# Patient Record
Sex: MALE | Race: WHITE
[De-identification: names, ages, dates, MRNs, and addresses within clinical notes are randomized per-mention and may not be internally consistent; named-entity substitution may affect disease eponyms.]

---

## 2017-11-16 ENCOUNTER — HOSPITAL ENCOUNTER (INPATIENT)
Dept: HOSPITAL 17 - NEPE | Age: 77
LOS: 3 days | Discharge: HOME HEALTH | DRG: 811 | End: 2017-11-19
Attending: FAMILY MEDICINE | Admitting: FAMILY MEDICINE
Payer: MEDICARE

## 2017-11-16 VITALS
RESPIRATION RATE: 24 BRPM | DIASTOLIC BLOOD PRESSURE: 60 MMHG | OXYGEN SATURATION: 99 % | SYSTOLIC BLOOD PRESSURE: 134 MMHG | HEART RATE: 90 BPM

## 2017-11-16 VITALS
RESPIRATION RATE: 18 BRPM | SYSTOLIC BLOOD PRESSURE: 136 MMHG | HEART RATE: 78 BPM | OXYGEN SATURATION: 99 % | DIASTOLIC BLOOD PRESSURE: 70 MMHG | TEMPERATURE: 98.7 F

## 2017-11-16 VITALS
OXYGEN SATURATION: 98 % | SYSTOLIC BLOOD PRESSURE: 127 MMHG | HEART RATE: 72 BPM | RESPIRATION RATE: 18 BRPM | DIASTOLIC BLOOD PRESSURE: 61 MMHG | TEMPERATURE: 98.2 F

## 2017-11-16 VITALS
RESPIRATION RATE: 21 BRPM | HEART RATE: 84 BPM | DIASTOLIC BLOOD PRESSURE: 84 MMHG | OXYGEN SATURATION: 96 % | SYSTOLIC BLOOD PRESSURE: 154 MMHG | TEMPERATURE: 97.4 F

## 2017-11-16 VITALS
OXYGEN SATURATION: 99 % | RESPIRATION RATE: 20 BRPM | SYSTOLIC BLOOD PRESSURE: 113 MMHG | DIASTOLIC BLOOD PRESSURE: 57 MMHG | HEART RATE: 68 BPM

## 2017-11-16 VITALS — WEIGHT: 209.22 LBS | HEIGHT: 66 IN | BODY MASS INDEX: 33.62 KG/M2

## 2017-11-16 VITALS
OXYGEN SATURATION: 96 % | SYSTOLIC BLOOD PRESSURE: 118 MMHG | RESPIRATION RATE: 20 BRPM | TEMPERATURE: 97.5 F | DIASTOLIC BLOOD PRESSURE: 61 MMHG | HEART RATE: 68 BPM

## 2017-11-16 VITALS
DIASTOLIC BLOOD PRESSURE: 96 MMHG | RESPIRATION RATE: 16 BRPM | HEART RATE: 84 BPM | TEMPERATURE: 98.3 F | SYSTOLIC BLOOD PRESSURE: 170 MMHG | OXYGEN SATURATION: 99 %

## 2017-11-16 VITALS — SYSTOLIC BLOOD PRESSURE: 127 MMHG | RESPIRATION RATE: 20 BRPM | HEART RATE: 69 BPM | DIASTOLIC BLOOD PRESSURE: 61 MMHG

## 2017-11-16 VITALS — OXYGEN SATURATION: 97 %

## 2017-11-16 DIAGNOSIS — Z95.1: ICD-10-CM

## 2017-11-16 DIAGNOSIS — K64.8: ICD-10-CM

## 2017-11-16 DIAGNOSIS — D64.9: Primary | ICD-10-CM

## 2017-11-16 DIAGNOSIS — Z79.82: ICD-10-CM

## 2017-11-16 DIAGNOSIS — Z86.73: ICD-10-CM

## 2017-11-16 DIAGNOSIS — Z95.5: ICD-10-CM

## 2017-11-16 DIAGNOSIS — Z87.891: ICD-10-CM

## 2017-11-16 DIAGNOSIS — F10.21: ICD-10-CM

## 2017-11-16 DIAGNOSIS — K44.9: ICD-10-CM

## 2017-11-16 DIAGNOSIS — I25.2: ICD-10-CM

## 2017-11-16 DIAGNOSIS — N28.1: ICD-10-CM

## 2017-11-16 DIAGNOSIS — M15.9: ICD-10-CM

## 2017-11-16 DIAGNOSIS — G47.30: ICD-10-CM

## 2017-11-16 DIAGNOSIS — R01.1: ICD-10-CM

## 2017-11-16 DIAGNOSIS — J44.9: ICD-10-CM

## 2017-11-16 DIAGNOSIS — F41.9: ICD-10-CM

## 2017-11-16 DIAGNOSIS — I25.10: ICD-10-CM

## 2017-11-16 DIAGNOSIS — F32.9: ICD-10-CM

## 2017-11-16 DIAGNOSIS — I50.23: ICD-10-CM

## 2017-11-16 DIAGNOSIS — I11.0: ICD-10-CM

## 2017-11-16 DIAGNOSIS — I35.8: ICD-10-CM

## 2017-11-16 DIAGNOSIS — K29.50: ICD-10-CM

## 2017-11-16 LAB
ALP SERPL-CCNC: 68 U/L (ref 45–117)
ALT SERPL-CCNC: 27 U/L (ref 12–78)
ANION GAP SERPL CALC-SCNC: 9 MEQ/L (ref 5–15)
APTT BLD: 22.6 SEC (ref 24.3–30.1)
AST SERPL-CCNC: 20 U/L (ref 15–37)
BASOPHILS # BLD AUTO: 0.1 TH/MM3 (ref 0–0.2)
BASOPHILS NFR BLD: 1.1 % (ref 0–2)
BILIRUB SERPL-MCNC: 0.2 MG/DL (ref 0.2–1)
BUN SERPL-MCNC: 22 MG/DL (ref 7–18)
CHLORIDE SERPL-SCNC: 108 MEQ/L (ref 98–107)
CK SERPL-CCNC: 41 U/L (ref 39–308)
CK SERPL-CCNC: 53 U/L (ref 39–308)
EOSINOPHIL # BLD: 0.1 TH/MM3 (ref 0–0.4)
EOSINOPHIL NFR BLD: 1.3 % (ref 0–4)
ERYTHROCYTE [DISTWIDTH] IN BLOOD BY AUTOMATED COUNT: 20.4 % (ref 11.6–17.2)
GFR SERPLBLD BASED ON 1.73 SQ M-ARVRAT: 53 ML/MIN (ref 89–?)
HCO3 BLD-SCNC: 24.1 MEQ/L (ref 21–32)
HCT VFR BLD CALC: 20.2 % (ref 39–51)
HEMO FLAGS: (no result)
INR PPP: 1 RATIO
LYMPHOCYTES # BLD AUTO: 1 TH/MM3 (ref 1–4.8)
LYMPHOCYTES NFR BLD AUTO: 13.8 % (ref 9–44)
MCH RBC QN AUTO: 15.5 PG (ref 27–34)
MCHC RBC AUTO-ENTMCNC: 27.2 % (ref 32–36)
MCV RBC AUTO: 56.9 FL (ref 80–100)
MONOCYTES NFR BLD: 10.6 % (ref 0–8)
NEUTROPHILS # BLD AUTO: 5.3 TH/MM3 (ref 1.8–7.7)
NEUTROPHILS NFR BLD AUTO: 73.2 % (ref 16–70)
PLATELET # BLD: 303 TH/MM3 (ref 150–450)
POTASSIUM SERPL-SCNC: 4.2 MEQ/L (ref 3.5–5.1)
PROTHROMBIN TIME: 10.9 SEC (ref 9.8–11.6)
RBC # BLD AUTO: 3.55 MIL/MM3 (ref 4.5–5.9)
SODIUM SERPL-SCNC: 141 MEQ/L (ref 136–145)
WBC # BLD AUTO: 7.3 TH/MM3 (ref 4–11)

## 2017-11-16 PROCEDURE — P9016 RBC LEUKOCYTES REDUCED: HCPCS

## 2017-11-16 PROCEDURE — 85014 HEMATOCRIT: CPT

## 2017-11-16 PROCEDURE — 88305 TISSUE EXAM BY PATHOLOGIST: CPT

## 2017-11-16 PROCEDURE — 94150 VITAL CAPACITY TEST: CPT

## 2017-11-16 PROCEDURE — 83880 ASSAY OF NATRIURETIC PEPTIDE: CPT

## 2017-11-16 PROCEDURE — 74250 X-RAY XM SM INT 1CNTRST STD: CPT

## 2017-11-16 PROCEDURE — 86920 COMPATIBILITY TEST SPIN: CPT

## 2017-11-16 PROCEDURE — G0103 PSA SCREENING: HCPCS

## 2017-11-16 PROCEDURE — 88312 SPECIAL STAINS GROUP 1: CPT

## 2017-11-16 PROCEDURE — 36430 TRANSFUSION BLD/BLD COMPNT: CPT

## 2017-11-16 PROCEDURE — 86901 BLOOD TYPING SEROLOGIC RH(D): CPT

## 2017-11-16 PROCEDURE — 82728 ASSAY OF FERRITIN: CPT

## 2017-11-16 PROCEDURE — 93306 TTE W/DOPPLER COMPLETE: CPT

## 2017-11-16 PROCEDURE — 85730 THROMBOPLASTIN TIME PARTIAL: CPT

## 2017-11-16 PROCEDURE — 76536 US EXAM OF HEAD AND NECK: CPT

## 2017-11-16 PROCEDURE — 83540 ASSAY OF IRON: CPT

## 2017-11-16 PROCEDURE — 71010: CPT

## 2017-11-16 PROCEDURE — 94640 AIRWAY INHALATION TREATMENT: CPT

## 2017-11-16 PROCEDURE — 83550 IRON BINDING TEST: CPT

## 2017-11-16 PROCEDURE — 85018 HEMOGLOBIN: CPT

## 2017-11-16 PROCEDURE — 84484 ASSAY OF TROPONIN QUANT: CPT

## 2017-11-16 PROCEDURE — 82550 ASSAY OF CK (CPK): CPT

## 2017-11-16 PROCEDURE — 85027 COMPLETE CBC AUTOMATED: CPT

## 2017-11-16 PROCEDURE — 86900 BLOOD TYPING SEROLOGIC ABO: CPT

## 2017-11-16 PROCEDURE — 93005 ELECTROCARDIOGRAM TRACING: CPT

## 2017-11-16 PROCEDURE — 86850 RBC ANTIBODY SCREEN: CPT

## 2017-11-16 PROCEDURE — 80053 COMPREHEN METABOLIC PANEL: CPT

## 2017-11-16 PROCEDURE — 30233N1 TRANSFUSION OF NONAUTOLOGOUS RED BLOOD CELLS INTO PERIPHERAL VEIN, PERCUTANEOUS APPROACH: ICD-10-PCS | Performed by: EMERGENCY MEDICINE

## 2017-11-16 PROCEDURE — 85025 COMPLETE CBC W/AUTO DIFF WBC: CPT

## 2017-11-16 PROCEDURE — 94664 DEMO&/EVAL PT USE INHALER: CPT

## 2017-11-16 PROCEDURE — 85610 PROTHROMBIN TIME: CPT

## 2017-11-16 PROCEDURE — 80048 BASIC METABOLIC PNL TOTAL CA: CPT

## 2017-11-16 PROCEDURE — 76700 US EXAM ABDOM COMPLETE: CPT

## 2017-11-16 PROCEDURE — 85379 FIBRIN DEGRADATION QUANT: CPT

## 2017-11-16 RX ADMIN — STANDARDIZED SENNA CONCENTRATE AND DOCUSATE SODIUM SCH TAB: 8.6; 5 TABLET, FILM COATED ORAL at 20:27

## 2017-11-16 RX ADMIN — Medication SCH ML: at 20:27

## 2017-11-16 NOTE — RADRPT
EXAM DATE/TIME:  11/16/2017 09:06 

 

[facility] COMPARISON:     

CHEST SINGLE AP, March 31, 2015, 3:22.

 

                     

INDICATIONS :     

short of breath

                     

 

MEDICAL HISTORY :     

Chronic obstructive pulmonary disease.          

 

SURGICAL HISTORY :     

CABG. Coronary artery stent.  

 

ENCOUNTER:     

Initial                                        

 

ACUITY:     

2 months      

 

PAIN SCORE:     

0/10

 

LOCATION:     

Bilateral chest 

 

FINDINGS:     

A single portable frontal view the chest shows moderate cardiomegaly. No significant pulmonary vascul
ar engorgement. Lungs are clear. Hiatal hernia noted. No effusions. Median sternotomy wires.

 

CONCLUSION:     

Cardiomegaly. Clear lungs. Hiatal hernia.

 

 

 

 Gucci Mcdaniel Jr., MD on November 16, 2017 at 9:36           

Board Certified Radiologist.

 This report was verified electronically.

## 2017-11-16 NOTE — RADRPT
EXAM DATE/TIME:  11/16/2017 14:05 

 

[facility] COMPARISON:     

No previous studies available for comparison.

        

 

 

INDICATIONS :     

Palpable mass, right neck. 

                     

 

MEDICAL HISTORY :     

Chronic obstructive pulmonary disease. Myocardial infarction. Hypercholesterolemia. Seizures. Syncope
. Congestive heart failure. 

 

SURGICAL HISTORY :          

Neurologic surgery. CABG. Cardiac catheterization. Coronary stent. Hernia repair. Carotid surgery.

 

ENCOUNTER:     

Initial

 

ACUITY:     

1 day

 

PAIN SCORE:     

2/10

 

LOCATION:     

Right neck 

                     

AREA EVALUATED:       

Right  neck. 

 

FINDINGS:     

 

MASSES:     

None.    Palpable abnormality appears to correspond to a prominent jugular vein      near the subclav
janice.

 

FLUID COLLECTIONS:     

None.  

 

OTHER:     

Negative.  

 

CONCLUSION:     

1. Palpable abnormality corresponds to prominent venous structures at the junction of the internal an
d subclavian veins in the right neck.

2. No mass lesions.

 

 

 

 Trace Meehan MD on November 16, 2017 at 16:18           

Board Certified Radiologist.

 This report was verified electronically.

## 2017-11-16 NOTE — HHI.HP
HPI


Service


Family Medicine


Primary Care Physician


No Primary Care Physician


Admission Diagnosis





Anemia, CHF


Diagnoses:  


International Travel<30 Days:  No


Contact w/Intl Traveler<30days:  No


Known Affected Area:  No


History of Present Illness


Patient is a 77-year-old male with extensive cardiac history status post CABG 

and stent placements x3 presented to the ED with complaints of progressively 

worsening shortness of breath on exertion and fatigue. Patient states that 

shortness of breath on exertion has been occurring during the past year but has 

worsen in the past 2 days to the point where he is not able to walk short 

distances without stopping to catch his breath. Patient states he has bandlike 

chest pain across the rib cage radiating to the back. Describes back pain as 

stabbing quality. Of note patient states that this chest pain is similar to 

pain he experienced before he had stents placed. Denies radiation of pain into 

his left jaw or left arm.  Patient also reports swelling in his lower 

extremities for the past 5 days. Denies weight loss, dizziness, vision changes, 

abdominal pain, nausea, vomiting, blood in his stool. Pt takes asa 325mg daily. 





Of note patient states last colonoscopy was 13 years ago and it was normal. He 

also stated that 13 years ago he was diagnosed with anemia (hgb 7.1) and 

required blood transfusion. The source of the bleeding was not identified.   


 (Christine Gamble MD, R1)





Review of Systems


Constitutional:  COMPLAINS OF: Fatigue, DENIES: Fever, Weight loss, Chills, 

Dizziness


Respiratory:  COMPLAINS OF: Shortness of breath (on exertion), DENIES: Cough


Cardiovascular:  COMPLAINS OF: Chest pain (band-like)


Gastrointestinal:  DENIES: Black stools, Bloody stools, Nausea, Vomiting


Genitourinary:  COMPLAINS OF: Nocturia


Musculoskeletal:  COMPLAINS OF: Back pain (stabbing back pain)


Integumentary:  DENIES: Rash


Neurologic:  DENIES: Headache, Localized weakness, Seizures (Christine Gamble MD

, R1)





Past Family Social History


Past Medical History


Anemia (hgb 7.1) requiring blood transfusion


s/p CABG and stent placement x3, 13 yrs ago


CHF


COPD- does not use home O2


Past Surgical History


Carotic endarterectomy 12 y ago


lens implants


 (Christine Gamble MD, R1)


Allergies:  


Coded Allergies:  


     No Known Allergies (Verified  Allergy, Unknown, 17)


Family History


Mother-- at 80 yo  due to stroke


Father--colon cancer at 80 yo


Brother--cardiac disease S/P open heart surgery 2


Social History


Patient lives alone 


stopped working in 2017


Sober the past 52 years


Quit smoking 50 years ago


Denies illicit drug use


 (Christine Gamble MD, R1)





Physical Exam


Vital Signs





Vital Signs








  Date Time  Temp Pulse Resp B/P (MAP) Pulse Ox O2 Delivery O2 Flow Rate FiO2


 


17 10:30  68 20 113/57 (75) 99 Room Air  


 


17 09:48     99 Nasal Cannula 2.00 


 


17 08:37  90 24 134/60 (84) 99 Nasal Cannula 2.00 


 


17 08:33   16  99 Nasal Cannula 2.00 


 


17 08:27 98.3 84 16 170/96 (120) 99   








Physical Exam


GENERAL: This is a well-nourished, well-developed patient, in no apparent 

distress.


SKIN: No rashes, ecchymoses or lesions. Cool and dry.


HEAD: Atraumatic. Normocephalic. No temporal or scalp tenderness.


EYES: Pupils equal round and reactive. Extraocular motions intact. No scleral 

icterus. No injection or drainage. 


ENT: Nose without bleeding, purulent drainage or septal hematoma. Throat 

without erythema, tonsillar hypertrophy or exudate. Uvula midline. Airway 

patent.


NECK: Trachea midline. No lymphadenopathy. Supple, nontender, no meningeal 

signs. Pulsating area on right side of neck. 


CARDIOVASCULAR: Normal S1-S2 . Flow murmur heard on auscultation at left upper 

sternal border .Regular rate and rhythm without, gallops, or rubs. 


RESPIRATORY: Rales heard at lower lung bases BL.


GASTROINTESTINAL: Abdomen soft, non-tender, distended. No hepato-splenomegaly, 

or palpable masses. No guarding.


MUSCULOSKELETAL: Extremities without clubbing, cyanosis, or edema. No joint 

tenderness, effusion, or edema noted. No calf tenderness. Negative Homans sign 

bilaterally.


NEUROLOGICAL: Awake and alert. Cranial nerves II through XII intact.  Motor and 

sensory grossly within normal limits. Five out of 5 muscle strength in all 

muscle groups.  Normal speech.


Laboratory





Laboratory Tests








Test


  17


08:50 17


10:40


 


White Blood Count 7.3  


 


Red Blood Count 3.55  


 


Hemoglobin 5.5  


 


Hematocrit 20.2  


 


Mean Corpuscular Volume 56.9  


 


Mean Corpuscular Hemoglobin 15.5  


 


Mean Corpuscular Hemoglobin


Concent 27.2 


  


 


 


Red Cell Distribution Width 20.4  


 


Platelet Count 303  


 


Mean Platelet Volume 8.8  


 


Neutrophils (%) (Auto) 73.2  


 


Lymphocytes (%) (Auto) 13.8  


 


Monocytes (%) (Auto) 10.6  


 


Eosinophils (%) (Auto) 1.3  


 


Basophils (%) (Auto) 1.1  


 


Neutrophils # (Auto) 5.3  


 


Lymphocytes # (Auto) 1.0  


 


Monocytes # (Auto) 0.8  


 


Eosinophils # (Auto) 0.1  


 


Basophils # (Auto) 0.1  


 


CBC Comment DIFF FINAL  


 


Differential Comment   


 


Blood Urea Nitrogen 22  


 


Creatinine 1.31  


 


Random Glucose 85  


 


Total Protein 6.8  


 


Albumin 3.2  


 


Calcium Level 8.3  


 


Alkaline Phosphatase 68  


 


Aspartate Amino Transf


(AST/SGOT) 20 


  


 


 


Alanine Aminotransferase


(ALT/SGPT) 27 


  


 


 


Total Bilirubin 0.2  


 


Sodium Level 141  


 


Potassium Level 4.2  


 


Chloride Level 108  


 


Carbon Dioxide Level 24.1  


 


Anion Gap 9  


 


Estimat Glomerular Filtration


Rate 53 


  


 


 


Troponin I 0.13  


 


B-Type Natriuretic Peptide 815  


 


Prothrombin Time  10.9 


 


Prothromb Time International


Ratio 


  1.0 


 


 


Activated Partial


Thromboplast Time 


  22.6 


 








 (Christine Gamble MD, R1)


Result Diagram:  


17 0850                                                                  

              17 0850





Imaging





Last Impressions








Neck Ultrasound 17 1346 Signed





Impressions: 





 Service Date/Time:   14:05 - CONCLUSION:  1. 

Palpable 





 abnormality corresponds to prominent venous structures at the junction of the 





 internal and subclavian veins in the right neck. 2. No mass lesions.     Trace Meehan MD 


 


Chest X-Ray 17 0849 Signed





Impressions: 





 Service Date/Time:   09:06 - CONCLUSION:  





 Cardiomegaly. Clear lungs. Hiatal hernia.     Gucci Mcdaniel Jr., MD 


 


Abdomen Ultrasound 17 0000 Signed





Impressions: 





 Service Date/Time:   13:48 - CONCLUSION:  1. 6 x 8 

mm 





 nonobstructing stone in the lower pole collecting system of the right kidney. 

2. 





 Simple cortical cyst laterally in the midpole of the left kidney. 3. Pancreas 

is 





 partially obscured by overlying bowel gas. Visualized portions are 





 sonographically intact. 4. Otherwise negative.     Trace Meehan MD 








 (Christine Gamble MD, R1)





Caprini VTE Risk Assessment


Caprini VTE Risk Assessment:  Mod/High Risk (score >= 2)


Caprini Risk Assessment Model











 Point Value = 1          Point Value = 2  Point Value = 3  Point Value = 5


 


Age 41-60


Minor surgery


BMI > 25 kg/m2


Swollen legs


Varicose veins


Pregnancy or postpartum


History of unexplained or recurrent


   spontaneous 


Oral contraceptives or hormone


   replacement


Sepsis (< 1 month)


Serious lung disease, including


   pneumonia (< 1 month)


Abnormal pulmonary function


Acute myocardial infarction


Congestive heart failure (< 1 month)


History of inflammatory bowel disease


Medical patient at bed rest Age 61-74


Arthroscopic surgery


Major open surgery (> 45 min)


Laparoscopic surgery (> 45 min)


Malignancy


Confined to bed (> 72 hours)


Immobilizing plaster cast


Central venous access Age >= 75


History of VTE


Family history of VTE


Factor V Leiden


Prothrombin [id number]


Lupus anticoagulant


Anticardiolipin antibodies


Elevated serum homocysteine


Heparin-induced thrombocytopenia


Other congenital or acquired


   thrombophilia Stroke (< 1 month)


Elective arthroplasty


Hip, pelvis, or leg fracture


Acute spinal cord injury (< 1 month)








Prophylaxis Regimen











   Total Risk


Factor Score Risk Level Prophylaxis Regimen


 


0-1      Low Early ambulation


 


2 Moderate Order ONE of the following:


*Sequential Compression Device (SCD)


*Heparin 5000 units SQ BID


 


3-4 Higher Order ONE of the following medications:


*Heparin 5000 units SQ TID


*Enoxaparin/Lovenox 40 mg SQ daily (WT < 150 kg, CrCl > 30 mL/min)


*Enoxaparin/Lovenox 30 mg SQ daily (WT < 150 kg, CrCl > 10-29 mL/min)


*Enoxaparin/Lovenox 30 mg SQ BID (WT < 150 kg, CrCl > 30 mL/min)


AND/OR


*Sequential Compression Device (SCD)


 


5 or more Highest Order ONE of the following medications:


*Heparin 5000 units SQ TID (Preferred with Epidurals)


*Enoxaparin/Lovenox 40 mg SQ daily (WT < 150 kg, CrCl > 30 mL/min)


*Enoxaparin/Lovenox 30 mg SQ daily (WT < 150 kg, CrCl > 10-29 mL/min)


*Enoxaparin/Lovenox 30 mg SQ BID (WT < 150 kg, CrCl > 30 mL/min)


AND


*Sequential Compression Device (SCD)








 (Christine Gamble MD, R1)





Assessment and Plan


Assessment and Plan


76 yo M with extensive cardiac hx s/p CABG and stent placement admitted for w/u 

of symptomatic anemia.


Code Status


Full code


Discussed Condition With


sdw Dr. Capone and Dr. Gallego


 (Christine Gamble MD, R1)


Attending Attestation


THIS CASE WAS DISCUSSED WITH THE RESIDENT PHYSICIANS. I HAVE REVIEWED THE 

RECORD AND AGREE WITH THE ABOVE NOTE AND PLAN OF CARE AS WAS DISCUSSED. I HAVE 

AUTHORIZED THE ORDER FOR ADMISSION TO AN IN-PATIENT STATUS.


I was present for the entire history and physical and agree with the above 

assessment and plan.  At this time it appears the EKG changes and symptoms are 

cardiac in nature but likely secondary to the severe anemia.   


 (Portia Capone MD)


Problem List:  


(1) Symptomatic anemia


ICD Codes:  D64.9 - Anemia, unspecified


Status:  Acute


Plan:  


-Flow murmur heard on cardiac exam. 


-H&H on admission found to 5.5/20.2


-2 units of PRBC ordered for transfusion


-Monitor H&H posttransfusion


-Follow-up a.m. labs





-GI consulted, recommendations appreciated





(2) CAD (coronary artery disease)


ICD Codes:  I25.10 - Atherosclerotic heart disease of native coronary artery 

without angina pectoris


Status:  Chronic


Plan:  


-EKG on admission showed: inverted t-waves, in lateral lead suggestive of 

ischemia and ST depression in leads V4-V6, these changes differ from previous 

EKG done in 2015 


- CXR showed : cardiomegaly, clear lung and hiatal hernia


-f/u echo, neck u/s for Right pulsatile neck veins?, abdominal u/s for 

abdominal distension, d-dimer, EKG


-f/u cardiac cardiac enzymes and troponins


-c/w asa


-morphine prn for chest pain


-monitor vs


-patient not placed on beta-blockers due to hypotension. Not placed on heparin 

due to concern for bleeding. 


-case discussed with cardiology and consult not necessary at this time





(3) Nutrition, metabolism, and development symptoms


ICD Codes:  R63.8 - Other symptoms and signs concerning food and fluid intake


Plan:  Fluids: not indicated at this time


Electrolytes: WNL, replete as needed


Nutrition: NPO after midnight


DVT ppx: SCDs


 (Christine Gamble MD, R1)





Physician Certification


2 Midnight Certification Type:  Admission for Inpatient Services


Order for Inpatient Services


The services are ordered in accordance with Medicare regulations or non-

Medicare payer requirements, as applicable.  In the case of services not 

specified as inpatient-only, they are appropriately provided as inpatient 

services in accordance with the 2-midnight benchmark.


Estimated LOS (days):  3


 days is the estimated time the patient will need to remain in the hospital, 

assuming treatment plan goals are met and no additional complications.


Post-Hospital Plan:  Not yet determined


 (Christine Gamble MD, R1)











Christine Gamble MD, R1 2017 11:31


Portia Capone MD 2017 08:21

## 2017-11-16 NOTE — PD
HPI


Chief Complaint:  Respiratory Distress


Time Seen by Provider:  08:49


Travel History


International Travel<30 days:  No


Contact w/Intl Traveler<30days:  No


Traveled to known affect area:  No





History of Present Illness


HPI


Patient is a 77-year-old male who comes in complaining of increasing shortness 

of breath.  He says he has been shortness of breath.  The past few weeks, but 

it has been getting worse.  He does acknowledge that he has extensive cardiac 

history.  He says he also has CHF and COPD.  He denies any nausea or vomiting.  

He says he has noticed that his feet are swollen.  He denies any chest pain.  

He has not had any abdominal pain or black stools.  He says he only takes a 

full aspirin every day.





PFSH


Past Medical History


Arthritis:  Yes (generalized)


Blood Disorders:  No


Anxiety:  Yes


Depression:  Yes


Heart Rhythm Problems:  Yes


Cancer:  No


Cardiac Catheterization:  Yes


Cardiovascular Problems:  Yes


High Cholesterol:  Yes


Chest Pain:  Yes


Congestive Heart Failure:  Yes


COPD:  Yes


Cerebrovascular Accident:  Yes


Diabetes:  No


Endocrine:  No


Gastrointestinal Disorders:  Yes (irritable bowel dz)


Genitourinary:  Yes


Headaches:  Yes


Hypertension:  Yes


Immune Disorder:  No


Musculoskeletal:  Yes


Neurologic:  Yes


Psychiatric:  No


Respiratory:  Yes


Myocardial Infarction:  Yes


Seizures:  Yes


Sleep Apnea:  Yes


Tetanus Vaccination:  > 5 Years


Influenza Vaccination:  Yes





Past Surgical History


Abdominal Surgery:  Yes (hernia repair)


Cardiac Surgery:  Yes (cabg 2003)


Coronary Artery Bypass Graft:  Yes


Coronary Stent:  Yes (X3)


Eye Surgery:  Yes (implants)


Neurologic Surgery:  Yes (R CEA)


Oral Surgery:  Yes (T&A)


Other Surgery:  Yes (CAROTIDS)





Social History


Alcohol Use:  No (QUIT 40YRS AGO)


Tobacco Use:  No


Substance Use:  No





Allergies-Medications


(Allergen,Severity, Reaction):  


Coded Allergies:  


     No Known Allergies (Verified  Adverse Reaction, Unknown, 11/16/17)


Reported Meds & Prescriptions





Reported Meds & Active Scripts


Active


Reported


Aspirin 325 Mg Tab 325 Mg PO DAILY








Review of Systems


Except as stated in HPI:  all other systems reviewed are Neg


General / Constitutional:  No: Fever, Chills


HENT:  No: Headaches, Lightheadedness


Cardiovascular:  No: Chest Pain or Discomfort


Respiratory:  Positive: Cough, Shortness of Breath


Gastrointestinal:  No: Nausea, Vomiting


Musculoskeletal:  Positive: Edema, No: Myalgias


Skin:  No Rash, No Change in Pigmentation


Neurologic:  No: Weakness, Dizziness





Physical Exam


Narrative


GENERAL: Awake and alert, in no acute distress.  


SKIN: Focused skin assessment warm/dry.


HEAD: Atraumatic. Normocephalic. 


EYES: Pupils equal and round. No scleral icterus. 


ENT: Mucous membranes pink and moist.


NECK: Trachea midline. No JVD. 


CARDIOVASCULAR: Regular rate and rhythm.  No murmur appreciated.


RESPIRATORY: No accessory muscle use. Clear to auscultation. Breath sounds 

equal bilaterally. 


GASTROINTESTINAL: Abdomen soft, non-tender, nondistended. Hepatic and splenic 

margins not palpable. 


MUSCULOSKELETAL: No obvious deformities. No clubbing.  No cyanosis.  Mild edema 

of bilateral ankles. 


NEUROLOGICAL: Awake and alert. No obvious cranial nerve deficits.  Motor 

grossly within normal limits. Normal speech.


PSYCHIATRIC: Appropriate mood and affect; insight and judgment normal.





Data


Data


Last Documented VS





Vital Signs








  Date Time  Temp Pulse Resp B/P (MAP) Pulse Ox O2 Delivery O2 Flow Rate FiO2


 


11/16/17 10:30  68 20 113/57 (75) 99 Room Air  


 


11/16/17 09:48       2.00 


 


11/16/17 08:27 98.3       








Orders





 Orders


Complete Blood Count With Diff (11/16/17 08:49)


Comprehensive Metabolic Panel (11/16/17 08:49)


B-Type Natriuretic Peptide (11/16/17 08:49)


Act Partial Throm Time (Ptt) (11/16/17 08:49)


Prothrombin Time / Inr (Pt) (11/16/17 08:49)


Troponin I (11/16/17 08:49)


Iv Access Insert/Monitor (11/16/17 08:49)


Electrocardiogram (11/16/17 08:49)


Ecg Monitoring (11/16/17 08:49)


Oximetry (11/16/17 08:49)


Oxygen Administration (11/16/17 08:49)


Chest, Single Ap (11/16/17 08:49)


Sodium Chloride 0.9% Flush (Ns Flush) (11/16/17 09:00)


Albuterol-Ipratropium Neb (Duoneb Neb) (11/16/17 09:00)


Type And Screen (11/16/17 10:11)


Red Blood Cells (Rbc) (11/16/17 10:11)


Blood Product Administration (11/16/17 10:11)


Sodium Chlor 0.9% 250 Ml Inj (Ns 250 Ml (11/16/17 10:15)


Admit Order (Ed Use Only) (11/16/17 )





Labs





Laboratory Tests








Test


  11/16/17


08:50 11/16/17


10:40


 


White Blood Count 7.3 TH/MM3  


 


Red Blood Count 3.55 MIL/MM3  


 


Hemoglobin 5.5 GM/DL  


 


Hematocrit 20.2 %  


 


Mean Corpuscular Volume 56.9 FL  


 


Mean Corpuscular Hemoglobin 15.5 PG  


 


Mean Corpuscular Hemoglobin


Concent 27.2 % 


  


 


 


Red Cell Distribution Width 20.4 %  


 


Platelet Count 303 TH/MM3  


 


Mean Platelet Volume 8.8 FL  


 


Neutrophils (%) (Auto) 73.2 %  


 


Lymphocytes (%) (Auto) 13.8 %  


 


Monocytes (%) (Auto) 10.6 %  


 


Eosinophils (%) (Auto) 1.3 %  


 


Basophils (%) (Auto) 1.1 %  


 


Neutrophils # (Auto) 5.3 TH/MM3  


 


Lymphocytes # (Auto) 1.0 TH/MM3  


 


Monocytes # (Auto) 0.8 TH/MM3  


 


Eosinophils # (Auto) 0.1 TH/MM3  


 


Basophils # (Auto) 0.1 TH/MM3  


 


CBC Comment DIFF FINAL  


 


Differential Comment   


 


Blood Urea Nitrogen 22 MG/DL  


 


Creatinine 1.31 MG/DL  


 


Random Glucose 85 MG/DL  


 


Total Protein 6.8 GM/DL  


 


Albumin 3.2 GM/DL  


 


Calcium Level 8.3 MG/DL  


 


Alkaline Phosphatase 68 U/L  


 


Aspartate Amino Transf


(AST/SGOT) 20 U/L 


  


 


 


Alanine Aminotransferase


(ALT/SGPT) 27 U/L 


  


 


 


Total Bilirubin 0.2 MG/DL  


 


Sodium Level 141 MEQ/L  


 


Potassium Level 4.2 MEQ/L  


 


Chloride Level 108 MEQ/L  


 


Carbon Dioxide Level 24.1 MEQ/L  


 


Anion Gap 9 MEQ/L  


 


Estimat Glomerular Filtration


Rate 53 ML/MIN 


  


 


 


Troponin I 0.13 NG/ML  


 


B-Type Natriuretic Peptide 815 PG/ML  


 


Prothrombin Time  10.9 SEC 


 


Prothromb Time International


Ratio 


  1.0 RATIO 


 


 


Activated Partial


Thromboplast Time 


  22.6 SEC 


 











[facility]


Medical Decision Making


Medical Screen Exam Complete:  Yes


Emergency Medical Condition:  Yes


Medical Record Reviewed:  Yes


Interpretation(s)


ECG shows normal sinus rhythm at 71 there are T-wave inversions in 1 and aVL as 

well as leads V2 through V6.  No ST elevation or depression.


Differential Diagnosis


CHF exacerbation versus COPD exacerbation versus pneumonia versus ACS


Narrative Course


Patient is a 77-year-old male comes in complaining of shortness of breath.  

Exam shows mild edema both ankles.  IV established, labs sent.  Labs show a 

hemoglobin of 5.5.  Stool is negative for occult blood.  Troponin is slightly 

elevated at 0.13.





Type and screen sent, 2 units of PRBCs ordered for the patient.  He was 

consented for blood transfusion.  He does say that he had issues with anemia in 

the past, related to his heart surgery.  He says he required a transfusion at 

that time.





Patient will be admitted for further management.





Diagnosis





 Primary Impression:  


 Anemia


 Qualified Codes:  D64.9 - Anemia, unspecified


 Additional Impression:  


 CHF (congestive heart failure)


 Qualified Codes:  I50.23 - Acute on chronic systolic (congestive) heart failure





Admitting Information


Admitting Physician Requests:  Admit











Mariposa Mena MD Nov 16, 2017 10:44

## 2017-11-16 NOTE — PD.CONS
HPI


History of Present Illness


This is a 77 year old male with hx CHF, COPD, CABG 13y ago, stent placement, 

anemia, Oliva's who presented to the ER with worsening SOB, activity 

intolerance.  GI has been consulted for anemia, hgb 5.5 on admission. Denies 

black tarry stool, red blood in stool, n/v, abd pain, unintended weight loss.  

Takes daily ASA.  No other NSAIDs.  Not on blood thinners.  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 He had EGD and colonoscopy 13 y ago in [facility] for 

bleeding but says no source found. He said [facility] 

[facility]emic but did not determine source.  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

  "I hold the record for years of sobriety in [facility]."


 (Estephanie Sparrow)





PFSH


Past Medical History


CHF


COPD


anemia


Oliva's


Past Surgical History


CABG 13 y ago


stent placement- 2015


hernia repair abdomen


T&A


Carotic endarterectomy 12 y ago


lens implants


 (Estephanie Sparrow)


Coded Allergies:  


     No Known Allergies (Verified  Allergy, Unknown, 11/16/17)


Family History


?colon ca  - father


CVD


CVA


Social History


no ETOH - quit 52 y ago


no tobacco- quit 50 y ago


no illicit drug use


 (Estephanie Sparrow)





Review of Systems


Constitutional:  COMPLAINS OF: Dizziness


Eyes:  DENIES: Blurred vision


Ears, nose, mouth, throat:  DENIES: Hearing loss


Respiratory:  DENIES: Hemoptysis


Cardiovascular:  DENIES: Palpitations


Gastrointestinal:  DENIES: Abdominal pain, Black stools, Bloody stools, 

Constipation, Diarrhea, Nausea, Vomiting, Hematemesis


Musculoskeletal:  DENIES: Joint Swelling


Integumentary:  DENIES: Jaundice


Hematologic/lymphatic:  DENIES: Bruising


Neurologic:  DENIES: Headache


Psychiatric:  DENIES: Confusion (Estephanie Sparrow)





GI Exam


Vitals I&O





Vital Signs








  Date Time  Temp Pulse Resp B/P (MAP) Pulse Ox O2 Delivery O2 Flow Rate FiO2


 


11/16/17 13:41        


 


11/16/17 13:02 98.7 78 18 136/70 99   


 


11/16/17 12:42 98.2 72 18 127/61 98   


 


11/16/17 12:33  69 20 127/61 (83)  Room Air  


 


11/16/17 10:30  68 20 113/57 (75) 99 Room Air  


 


11/16/17 09:48     99 Nasal Cannula 2.00 


 


11/16/17 08:37  90 24 134/60 (84) 99 Nasal Cannula 2.00 


 


11/16/17 08:33   16  99 Nasal Cannula 2.00 


 


11/16/17 08:27 98.3 84 16 170/96 (120) 99   














I/O      


 


 11/15/17 11/15/17 11/15/17 11/16/17 11/16/17 11/16/17





 07:00 15:00 23:00 07:00 15:00 23:00


 


Intake Total     250 ml 


 


Balance     250 ml 


 


      


 


Intake Blood Product IV Normal Saline Flush     250 ml 








Imaging





Last Impressions








Chest X-Ray 11/16/17 0849 Signed





Impressions: 





 Service Date/Time:  Thursday, November 16, 2017 09:06 - CONCLUSION:  





 Cardiomegaly. Clear lungs. Hiatal hernia.     Gucci Mcdaniel Jr., MD 








Laboratory











Test


  11/16/17


08:50 11/16/17


10:40 11/16/17


12:25


 


White Blood Count 7.3 TH/MM3   


 


Red Blood Count 3.55 MIL/MM3   


 


Hemoglobin 5.5 GM/DL   


 


Hematocrit 20.2 %   


 


Mean Corpuscular Volume 56.9 FL   


 


Mean Corpuscular Hemoglobin 15.5 PG   


 


Mean Corpuscular Hemoglobin


Concent 27.2 % 


  


  


 


 


Red Cell Distribution Width 20.4 %   


 


Platelet Count 303 TH/MM3   


 


Mean Platelet Volume 8.8 FL   


 


Neutrophils (%) (Auto) 73.2 %   


 


Lymphocytes (%) (Auto) 13.8 %   


 


Monocytes (%) (Auto) 10.6 %   


 


Eosinophils (%) (Auto) 1.3 %   


 


Basophils (%) (Auto) 1.1 %   


 


Neutrophils # (Auto) 5.3 TH/MM3   


 


Lymphocytes # (Auto) 1.0 TH/MM3   


 


Monocytes # (Auto) 0.8 TH/MM3   


 


Eosinophils # (Auto) 0.1 TH/MM3   


 


Basophils # (Auto) 0.1 TH/MM3   


 


CBC Comment DIFF FINAL   


 


Differential Comment    


 


Blood Urea Nitrogen 22 MG/DL   


 


Creatinine 1.31 MG/DL   


 


Random Glucose 85 MG/DL   


 


Total Protein 6.8 GM/DL   


 


Albumin 3.2 GM/DL   


 


Calcium Level 8.3 MG/DL   


 


Alkaline Phosphatase 68 U/L   


 


Aspartate Amino Transf


(AST/SGOT) 20 U/L 


  


  


 


 


Alanine Aminotransferase


(ALT/SGPT) 27 U/L 


  


  


 


 


Total Bilirubin 0.2 MG/DL   


 


Sodium Level 141 MEQ/L   


 


Potassium Level 4.2 MEQ/L   


 


Chloride Level 108 MEQ/L   


 


Carbon Dioxide Level 24.1 MEQ/L   


 


Anion Gap 9 MEQ/L   


 


Estimat Glomerular Filtration


Rate 53 ML/MIN 


  


  


 


 


Troponin I 0.13 NG/ML   0.13 NG/ML 


 


B-Type Natriuretic Peptide 815 PG/ML   


 


Prothrombin Time  10.9 SEC  


 


Prothromb Time International


Ratio 


  1.0 RATIO 


  


 


 


Activated Partial


Thromboplast Time 


  22.6 SEC 


  


 


 


D-Dimer Quantitative (PE/DVT)  0.48 MG/L FEU  


 


Total Creatine Kinase   41 U/L 








Physical Examination


HEENT: PERRL; normocephalic; atraumatic; no jaundice. 


CHEST:  CTA


CARDIAC:  RRR + murmur


ABDOMEN:  Soft, protuberant, nontender; no hepatosplenomegaly; bowel sounds are 

present in all four quadrants.


EXTREMITIES: No clubbing, cyanosis, +1 pitting edema


SKIN:  Normal; no rash; no jaundice.


CNS:  No focal deficits; alert and oriented times three.


 (Estephanie Sparrow)





Assessment and Plan


Plan


ASSESSMENT


- anemia - hgb 5.5 on admission, microcytic hypochromic. stool occult neg. no 

signs GI bleeding.  hx anemia requiring blood transfusion, had EGD 2006


    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  Colonoscopy 12 y ago in [facility] as work up for anemia and no 

bleeding found. will need EGD/colonoscopy, poss capsule endoscopy


    cleared by cardiology


- elev troponins, CHF, COPD - cardiology consulted





PLAN


- EGD and colonoscopy


- obtain consent


- clears


- NPO after midnight


- GoLYtely


- may need capsule endoscopy


- monitor labs


- transfuse as necessary


- supportive care


THis pt seen by myself and Dr Sanchez and this note is written on his behalf


 (Estephanie Sparrow)


Physician Comments


Patient seen and examined


Agree with above


Continue with current supportive care


Monitor labs


Plan for an EGD and a colonoscopy tomorrow and if negative patient will require 

small bowel follow-through and capsule endoscopy


 (Joe Sanchez MD)











Estephanie Sparrow Nov 16, 2017 14:33


Joe Sanchez MD Nov 16, 2017 23:23

## 2017-11-17 VITALS
SYSTOLIC BLOOD PRESSURE: 144 MMHG | OXYGEN SATURATION: 95 % | DIASTOLIC BLOOD PRESSURE: 72 MMHG | TEMPERATURE: 97.8 F | RESPIRATION RATE: 20 BRPM

## 2017-11-17 VITALS
SYSTOLIC BLOOD PRESSURE: 121 MMHG | HEART RATE: 64 BPM | OXYGEN SATURATION: 95 % | DIASTOLIC BLOOD PRESSURE: 66 MMHG | TEMPERATURE: 97.7 F | RESPIRATION RATE: 21 BRPM

## 2017-11-17 VITALS
DIASTOLIC BLOOD PRESSURE: 78 MMHG | SYSTOLIC BLOOD PRESSURE: 149 MMHG | HEART RATE: 70 BPM | OXYGEN SATURATION: 94 % | RESPIRATION RATE: 20 BRPM | TEMPERATURE: 97.8 F

## 2017-11-17 VITALS
OXYGEN SATURATION: 96 % | HEART RATE: 69 BPM | TEMPERATURE: 98 F | DIASTOLIC BLOOD PRESSURE: 60 MMHG | RESPIRATION RATE: 16 BRPM | SYSTOLIC BLOOD PRESSURE: 128 MMHG

## 2017-11-17 VITALS
HEART RATE: 76 BPM | RESPIRATION RATE: 14 BRPM | OXYGEN SATURATION: 94 % | TEMPERATURE: 97.5 F | DIASTOLIC BLOOD PRESSURE: 76 MMHG | SYSTOLIC BLOOD PRESSURE: 146 MMHG

## 2017-11-17 VITALS
DIASTOLIC BLOOD PRESSURE: 73 MMHG | RESPIRATION RATE: 22 BRPM | HEART RATE: 70 BPM | TEMPERATURE: 97.6 F | OXYGEN SATURATION: 94 % | SYSTOLIC BLOOD PRESSURE: 138 MMHG

## 2017-11-17 VITALS
TEMPERATURE: 98.3 F | RESPIRATION RATE: 22 BRPM | SYSTOLIC BLOOD PRESSURE: 132 MMHG | OXYGEN SATURATION: 97 % | HEART RATE: 79 BPM | DIASTOLIC BLOOD PRESSURE: 63 MMHG

## 2017-11-17 VITALS
DIASTOLIC BLOOD PRESSURE: 70 MMHG | HEART RATE: 80 BPM | RESPIRATION RATE: 18 BRPM | OXYGEN SATURATION: 95 % | SYSTOLIC BLOOD PRESSURE: 133 MMHG | TEMPERATURE: 98.1 F

## 2017-11-17 VITALS
OXYGEN SATURATION: 95 % | DIASTOLIC BLOOD PRESSURE: 74 MMHG | SYSTOLIC BLOOD PRESSURE: 131 MMHG | TEMPERATURE: 97.9 F | RESPIRATION RATE: 23 BRPM | HEART RATE: 71 BPM

## 2017-11-17 VITALS — OXYGEN SATURATION: 99 %

## 2017-11-17 VITALS — HEART RATE: 69 BPM

## 2017-11-17 VITALS — HEART RATE: 76 BPM

## 2017-11-17 VITALS — OXYGEN SATURATION: 97 %

## 2017-11-17 LAB
ANION GAP SERPL CALC-SCNC: 8 MEQ/L (ref 5–15)
BUN SERPL-MCNC: 19 MG/DL (ref 7–18)
CHLORIDE SERPL-SCNC: 108 MEQ/L (ref 98–107)
GFR SERPLBLD BASED ON 1.73 SQ M-ARVRAT: 58 ML/MIN (ref 89–?)
HCO3 BLD-SCNC: 25 MEQ/L (ref 21–32)
HCT VFR BLD CALC: 24.6 % (ref 39–51)
HCT VFR BLD CALC: 30.2 % (ref 39–51)
POTASSIUM SERPL-SCNC: 4.1 MEQ/L (ref 3.5–5.1)
REVIEW FLAG: (no result)
SODIUM SERPL-SCNC: 141 MEQ/L (ref 136–145)

## 2017-11-17 PROCEDURE — 0DJD8ZZ INSPECTION OF LOWER INTESTINAL TRACT, VIA NATURAL OR ARTIFICIAL OPENING ENDOSCOPIC: ICD-10-PCS | Performed by: INTERNAL MEDICINE

## 2017-11-17 PROCEDURE — 0DB98ZX EXCISION OF DUODENUM, VIA NATURAL OR ARTIFICIAL OPENING ENDOSCOPIC, DIAGNOSTIC: ICD-10-PCS | Performed by: INTERNAL MEDICINE

## 2017-11-17 PROCEDURE — 0DB68ZX EXCISION OF STOMACH, VIA NATURAL OR ARTIFICIAL OPENING ENDOSCOPIC, DIAGNOSTIC: ICD-10-PCS | Performed by: INTERNAL MEDICINE

## 2017-11-17 RX ADMIN — ASPIRIN SCH MG: 325 TABLET ORAL at 09:00

## 2017-11-17 RX ADMIN — IPRATROPIUM BROMIDE AND ALBUTEROL SULFATE SCH AMPULE: .5; 3 SOLUTION RESPIRATORY (INHALATION) at 21:06

## 2017-11-17 RX ADMIN — IPRATROPIUM BROMIDE AND ALBUTEROL SULFATE SCH AMPULE: .5; 3 SOLUTION RESPIRATORY (INHALATION) at 02:00

## 2017-11-17 RX ADMIN — IPRATROPIUM BROMIDE AND ALBUTEROL SULFATE SCH AMPULE: .5; 3 SOLUTION RESPIRATORY (INHALATION) at 02:15

## 2017-11-17 RX ADMIN — BISACODYL SCH MG: 5 TABLET, COATED ORAL at 21:00

## 2017-11-17 RX ADMIN — IPRATROPIUM BROMIDE AND ALBUTEROL SULFATE SCH AMPULE: .5; 3 SOLUTION RESPIRATORY (INHALATION) at 08:51

## 2017-11-17 RX ADMIN — Medication SCH ML: at 09:00

## 2017-11-17 RX ADMIN — STANDARDIZED SENNA CONCENTRATE AND DOCUSATE SODIUM SCH TAB: 8.6; 5 TABLET, FILM COATED ORAL at 09:00

## 2017-11-17 RX ADMIN — STANDARDIZED SENNA CONCENTRATE AND DOCUSATE SODIUM SCH TAB: 8.6; 5 TABLET, FILM COATED ORAL at 21:00

## 2017-11-17 RX ADMIN — IPRATROPIUM BROMIDE AND ALBUTEROL SULFATE SCH AMPULE: .5; 3 SOLUTION RESPIRATORY (INHALATION) at 15:34

## 2017-11-17 RX ADMIN — BISACODYL SCH MG: 5 TABLET, COATED ORAL at 18:00

## 2017-11-17 RX ADMIN — Medication SCH ML: at 21:06

## 2017-11-17 NOTE — EKG
Date Performed: 2017       Time Performed: 20:44:35

 

PTAGE:      77 years

 

EKG:      Sinus rhythm 

 

 INFERIOR MYOCARDIAL INFARCTION , PROBABLY OLD ST DEVIATION AND MODERATE T-WAVE ABNORMALITY, CONSIDER
 ANTEROLATERAL ISCHEMIA ABNORMAL ECG Since 

 

 PREVIOUS TRACING            , no significant change noted PREVIOUS TRACIN2017 13.04

 

DOCTOR:   Bello James  Interpretating Date/Time  2017 18:37:37

## 2017-11-17 NOTE — EKG
Date Performed: 2017       Time Performed: 08:51:47

 

PTAGE:      77 years

 

EKG:      Sinus rhythm 

 

 INFERIOR MYOCARDIAL INFARCTION ST DEVIATION AND MODERATE T-WAVE ABNORMALITY, CONSIDER ANTEROLATERAL 
ISCHEMIA ABNORMAL ECG Compared to 

 

 PREVIOUS TRACING            , the T-wave changes anterolaterally are new. Clinical correlation and f
ollow up tracing is recommended. PREVIOUS TRACIN2015 06.10

 

DOCTOR:   Bello James  Interpretating Date/Time  2017 18:37:14

## 2017-11-17 NOTE — HHI.FPPN
Subjective


Remarks


Patient seen and examined at bedside. Overnight pt developed SOB after 

transfusion of of 2 units of RPBC. Patient was evaluated by the resident team 

overnight. He was given lasix 40mg x1 and duonebs. Patient schedule to have EGD 

and colonoscopy today. Patient states he still feels short of breath with 

minimal exertion, for example walking to the bathroom. His breathing is ok if 

he is siting in bed. Denies blood in his stool. 


 (Christine Gamble MD, R1)





Objective


Vitals





Vital Signs








  Date Time  Temp Pulse Resp B/P (MAP) Pulse Ox O2 Delivery O2 Flow Rate FiO2


 


11/17/17 08:55     99   21


 


11/17/17 08:00 97.5 76 14 146/76 (99) 94   


 


11/17/17 07:40      Room Air  


 


11/17/17 05:52 97.8  20 144/72 95   


 


11/17/17 05:37 97.8 70 20 149/78 94   


 


11/17/17 05:11 97.6 70 22 138/73 94   


 


11/17/17 04:00 97.9 71 23 131/74 (93) 95   


 


11/17/17 03:43  76      


 


11/17/17 00:00 97.7 64 21 121/66 (84) 95   


 


11/16/17 22:09     97   


 


11/16/17 20:00 97.4 84 21 154/84 (107) 96   


 


11/16/17 20:00      Room Air  


 


11/16/17 16:00 97.5 68 20 118/61 (80) 96   


 


11/16/17 13:41        


 


11/16/17 13:02 98.7 78 18 136/70 99   


 


11/16/17 12:42 98.2 72 18 127/61 98   


 


11/16/17 12:33  69 20 127/61 (83)  Room Air  














I/O      


 


 11/16/17 11/16/17 11/16/17 11/17/17 11/17/17 11/17/17





 07:00 15:00 23:00 07:00 15:00 23:00


 


Intake Total  250 ml 800 ml 0 ml 400 ml 


 


Output Total    650 ml  


 


Balance  250 ml 800 ml -650 ml 400 ml 


 


      


 


Intake Oral    0 ml  


 


Packed Cells   800 ml  400 ml 


 


Blood Product IV Normal Saline Flush  250 ml    


 


Output Urine Total    650 ml  


 


# Bowel Movements    4  








 (Christine Gamble MD, R1)


Result Diagram:  


11/17/17 0205 11/17/17 0205





Objective Remarks


GENERAL: This is a well-nourished, well-developed patient, in no apparent 

distress siting in bed


SKIN: No rashes, ecchymoses or lesions. Cool and dry.


HEAD: Atraumatic. Normocephalic. No temporal or scalp tenderness.


EYES: Pupils equal round and reactive. Extraocular motions intact. No scleral 

icterus. No injection or drainage. 


ENT: Nose without bleeding, purulent drainage or septal hematoma. Throat 

without erythema, tonsillar hypertrophy or exudate. Uvula midline. Airway 

patent.


NECK: Trachea midline. No lymphadenopathy. Supple, nontender, no meningeal 

signs. Pulsating area on right side of neck. 


CARDIOVASCULAR: Normal S1-S2 . Flow murmur heard on auscultation at left upper 

sternal border .Regular rate and rhythm without, gallops, or rubs. 


RESPIRATORY: Rales heard at lower lung bases BL. Mild wheezing noted, most 

prominent on right upper lobe. 


GASTROINTESTINAL: Abdomen soft, non-tender, distended. No hepato-splenomegaly, 

or palpable masses. No guarding.


MUSCULOSKELETAL: Extremities without clubbing, cyanosis, or edema. No joint 

tenderness, effusion, or edema noted. No calf tenderness. Negative Homans sign 

bilaterally.


NEUROLOGICAL: Awake and alert. Cranial nerves II through XII intact.  Motor and 

sensory grossly within normal limits. Five out of 5 muscle strength in all 

muscle groups.  Normal speech.


 (Christine Gamble MD, R1)





A/P


Assessment and Plan


76 yo M with extensive cardiac hx s/p CABG and stent placement admitted for w/u 

of symptomatic anemia.





 sdw Dr. Capone  


 (Christine Gamble MD, R1)


Attending Attestation


Patient seen and examined.  Case reviewed and discussed with the resident team.

  Agree with plan of care as discussed with me and documented in the resident 

note.


 (Portia Capone MD)


Problem List:  


(1) Symptomatic anemia


ICD Codes:  D64.9 - Anemia, unspecified


Status:  Acute


Plan:  


-Flow murmur heard on cardiac exam. 


-H&H on admission found to 5.5/20.2


-S/p 2 units of PRBCs H/H increased appropriately to 7.4/24.6, 


-Patient received 1 additional unit of PRBCs overnight, total: s/p 3 units of 

PRBCs 


-Monitor H&H posttransfusion


-monitor vs and consider transfusing if hgb <8


-Follow-up a.m. labs





-GI following


-patient scheduled to have EGD and colonoscopy procedure today


-will f/u results and GI recommendations





(2) CAD (coronary artery disease)


ICD Codes:  I25.10 - Atherosclerotic heart disease of native coronary artery 

without angina pectoris


Status:  Chronic


Plan:  


-EKG on admission showed: inverted t-waves, in lateral lead suggestive of 

ischemia and ST depression in leads V4-V6, these changes differ from previous 

EKG done in 4/2015 


- CXR showed : cardiomegaly, clear lung and hiatal hernia


-Echo on 11/17: EF- 20-25%, Mildly dilated left ventricle, Mild aortic valve 

sclerosis and trace aortic valve regurgitation. 


-neck u/s: no masses, prominent venous structures at junction of internal and 

subclavian Right vein. 


abdominal u/s for abdominal distension showed: cyst Left kidney, and 

nonobstructing stone in lower pole of Right kidney.  


-cardiac cardiac enzymes and troponins remained stable 


-bnp- 1383, d-dimer-WNL,  


-c/w asa


-morphine prn for chest pain


-monitor vs


-patient not placed on beta-blockers due to hypotension. Not placed on heparin 

due to concern for bleeding. 


-case discussed with cardiology on admission and consult not necessary at this 

time





(3) Shortness of breath


ICD Codes:  R06.02 - Shortness of breath


Status:  Resolved


Plan:  Increased sob most likely due to anemia and exacerbated by blood 

transfusions.





-c/w duoneb Q6H PRN breathing treatment 


-Pt received lasix 40mg x1 overnight for sxs sob after transfusion of 2 units 

of PRBCs


-CXR on 11/16 showed : cardiomegaly, clear lung and hiatal hernia


-will order incentive spirometry


-lasix 20mg x1 given this morning, 11/17


-f/u bnp tomorrow am





(4) Nutrition, metabolism, and development symptoms


ICD Codes:  R63.8 - Other symptoms and signs concerning food and fluid intake


Plan:  Fluids: not indicated at this time


Electrolytes: WNL, replete as needed


Nutrition: clear liquid diet after GI procedures


DVT ppx: SCDs


 (Christine Gamble MD, R1)











Christine Gamble MD, R1 Nov 17, 2017 11:27


Portia Capone MD Nov 19, 2017 07:24

## 2017-11-17 NOTE — RADRPT
EXAM DATE/TIME:  11/17/2017 17:14 

 

[facility] COMPARISON:     

No previous studies available for comparison.

 

                     

INDICATIONS :     

Anemia. GI bleed.

                     

 

FLUORO TIME:     

.7 minutes

 

IMAGE COUNT:     

11

                     

 

CONTRAST:     

Entero Vu 24% Barium Sulfate (24% w/v, 20% w/w)

                     

 

IMAGING TIME(S):  

15 min, 30 min, 45 min

 

MEDICAL HISTORY :            

Chronic obstructive pulmonary disease. Myocardial infarction. Hypercholesterolemia. Seizures. Syncope
. Congestive heart failure.    

 

SURGICAL HISTORY :        

Neurologic surgery. CABG. Cardiac catheterization. Coronary stent. Hernia repair. Carotid surgery 

 

ENCOUNTER:     

Initial                                        

 

ACUITY:     

1 day      

 

PAIN SCORE:     

0/10

 

LOCATION:     

Bilateral  abdomen

 

FINDINGS:     

Preliminary film is unremarkable. The patient is status post sternotomy.

 

There is a moderate hiatal hernia.

 

Examination of the small bowel demonstrates normal mucosal pattern involving the jejunum and ileum.  
There is no evidence of mass or obstruction.  No intraluminal filling defects are identified.  Small 
bowel transit time is normal at 45 minutes.  Fluoroscopy of the abdomen and terminal ileum demonstrat
es no abnormality.

 

CONCLUSION:     Moderate hiatal hernia. The small bowel is unremarkable.

 

 

 

 Alex Forman MD on November 17, 2017 at 19:02           

Board Certified Radiologist.

 This report was verified electronically.

## 2017-11-17 NOTE — ECHRPT
Indication:   SOB

 

 CONCLUSIONS

 Mildly dilated left ventricle.  Wall thickness is normal. 

 The left ventricular systolic function is severely reduced with an estimated ejection fraction in th
e range of 

 20-25%. The basal posterior and basal lateral walls appear to contract normally; all other segments 
are 

 moderately to severely hypokinetic.

 

 Mild aortic valve sclerosis is present. 

 Trace aortic valve regurgitation. 

 

 There is moderate tricuspid valve regurgitation. 

 The estimated pulmonary arterial pressure is 53 mmHg. 

 

 Mild mitral valve regurgitation. 

 

 

 BP:  149   / 78      HR: 70                       Rhythm:

 

 MEASUREMENTS  (Male / Female) Normal Values       Technical Quality:Good

 2D ECHO

 LV Diastolic Diameter PLAX        5.7 cm                4.2 - 5.9 / 3.9 - 5.3 cm

 LV Systolic Diameter PLAX         5.1 cm                

 IVS Diastolic Thickness           0.9 cm                0.6 - 1.0 / 0.6 - 0.9 cm

 LVPW Diastolic Thickness          0.7 cm                0.6 - 1.0 / 0.6 - 0.9 cm

 LV Relative Wall Thickness        0.3                   

 RV Internal Dim ED PLAX           2.1 cm                

 LA Systolic Diameter LX           4.4 cm                3.0 - 4.0 / 2.7 - 3.8 cm

 

 M-MODE

 Aortic Root Diameter MM           3.1 cm                

 AV Cusp Separation MM             2.3 cm                

 

 DOPPLER

 AV Peak Velocity                  238.0 cm/s            

 AV Peak Gradient                  22.7 mmHg             

 AV Mean Gradient                  12.0 mmHg             

 AV Velocity Time Integral         54.4 cm               

 LVOT Peak Velocity                138.0 cm/s            

 LVOT Peak Gradient                7.6 mmHg              

 LVOT Velocity Time Integral       32.3 cm               

 Mitral E Point Velocity           122.0 cm/s            

 Mitral A Point Velocity           147.0 cm/s            

 Mitral E to A Ratio               0.8                   

 TR Peak Velocity                  347.0 cm/s            

 TR Peak Gradient                  48.2 mmHg             

 

 

 FINDINGS

 

 LEFT VENTRICLE

 Mildly dilated left ventricle. 

 Wall thickness is normal. 

 The left ventricular systolic function is severely reduced with an estimated ejection fraction in th
e range of 

 20-25%. The basal posterior and basal lateral walls appear to contract normally; all other segments 
are 

 moderately to severely hypokinetic.

 

 RIGHT VENTRICLE

 Normal right ventricular size and systolic function.  

 

 LEFT ATRIUM

 The left atrial size is normal.  

 

 RIGHT ATRIUM

 The right atrial size is normal.  

 

 ATRIAL SEPTUM

 Normal atrial septal thickness without atrial level shunting by limited color doppler interrogation.
  

 

 AORTA

 The aortic root and proximal ascending aorta are normal in size on limited imaging.  

 

 MITRAL VALVE

 Mild mitral valve regurgitation. 

 

 AORTIC VALVE

 Mild aortic valve sclerosis is present. 

 Trace aortic valve regurgitation. 

 

 TRICUSPID VALVE

 There is moderate tricuspid valve regurgitation. 

 The estimated pulmonary arterial pressure is 53 mmHg. 

 

 PULMONARY VALVE

 The pulmonary valve is not well visualized.  

 

 VESSELS

 The inferior vena cava is normal in size.  

 

 PERICARDIUM

 No pericardial effusion.  

 

 

 

 

  Flo Martinez MD

  (Electronically Signed)

  Final Date:17 November 2017 10:44

## 2017-11-17 NOTE — EKG
Date Performed: 2017       Time Performed: 13:04:19

 

PTAGE:      77 years

 

EKG:      Sinus rhythm 

 

 INFERIOR MYOCARDIAL INFARCTION ST DEVIATION AND MODERATE T-WAVE ABNORMALITY, CONSIDER ANTEROLATERAL 
ISCHEMIA ABNORMAL ECG Since 

 

 PREVIOUS TRACING            , no significant change noted PREVIOUS TRACIN2017 08.51

 

DOCTOR:   Bello James  Interpretating Date/Time  2017 18:37:26

## 2017-11-17 NOTE — PD.PROCEDR
GI Procedure





REFERRING PHYSICIAN


REUBEN





PROCEDURE PERFORMED


EGD with biopsy followed by colonoscopy





INDICATION FOR PROCEDURE


Anemia





PROCEDURE:


The procedure, risks and benefits were discussed with Mr. Doyle and informed


consent was obtained.  Anesthesia sedated him with Diprivan.  He was placed in 

the left lateral decubitus position.





EGD:


The Pentax videoscope was introduced through the oropharynx and advanced to the 

second portion of the duodenum under direct visualization. Retroflexion was 

performed in the stomach.





FINDINGS:


Esophagus this appeared to be unremarkable with normal limits


The stomach there was a moderate size hiatal hernia there was some erythema at 

the level of the diaphragm indicating possible ischemic changes to the gastric 

mucosa may be also some superficial erosions nothing obvious though no blood or 

bleeding no ulcers biopsies were taken from this level the rest of the stomach 

was unremarkable


The duodenum this was normal random biopsies were taken for further evaluation





Colonoscopy:


The Pentax videoscope was introduced through the rectum and advanced to cecum 

where the ileocecal valve and appendiceal orifice were identified. Retroflexion 

was performed in the rectum. Colonic prep was good





FINDINGS:


Colonic withdrawal time greater than 6 minutes as the scope was slowly 

withdrawn colonic mucosa was carefully inspected this was noted to be 

unremarkable and within normal limits all the way through retroflexion did 

reveal moderate size internal hemorrhoids rectal examination otherwise 

unremarkable





ESTIMATED BLOOD LOSS:


None





SPECIMENS REMOVED:


Gastric biopsy and duodenal biopsy





COMPLICATIONS:


None





IMPRESSION:


Hiatal hernia


Gastritis


Internal hemorrhoids





PLAN:


Await biopsy


PPI


Small bowel follow-through


Outpatient capsule endoscopy


Supportive care











Joe Sanchez MD Nov 17, 2017 15:12

## 2017-11-17 NOTE — HHI.PR
Addendum to Inpatient Note


Addendum Reason:  Additional Documentation


Additional Information


Subjective


Went to evaluate patient after RN call about SOB. Patient resting comfortably 

in bed, feels a little short of breath, about the same as admission per 

patient. Denies chest pain, denies other new symptoms.





Objective





Vital Signs








  Date Time  Temp Pulse Resp B/P (MAP) Pulse Ox O2 Delivery O2 Flow Rate FiO2


 


11/17/17 03:43  76      


 


11/17/17 00:00 97.7  21 121/66 (84) 95   


 


11/16/17 20:00      Room Air  


 


11/16/17 09:48       2.00 








Physical Exam


Gen: WDWN overweight adult white male sitting up in bed appearing short of 

breath but not acutely distressed


Lungs: Moderate bibasilar wet crackles. Mild wheezing anteriorly.


CV: NRRR, normal s1/S2, no murmur


Abd: Soft, mildly distended, nontender


MSK: No edema





A/P


SOB likely related to symptomatic anemia. S/p 2 units of blood, H/H came up 

appropriately. With cardiac history and lung exam, could be a little fluid 

overloaded after the 2 units.


- Lasix 40 mg IV x1


- Transfuse 1 more unit PRBC


- Continue DuoNebs Q6H PRN SOB


- Repeat H/H after the additional unit of blood





sdw Donavan Fernandez MD R2 Nov 17, 2017 03:58

## 2017-11-18 VITALS — OXYGEN SATURATION: 96 %

## 2017-11-18 VITALS
SYSTOLIC BLOOD PRESSURE: 141 MMHG | OXYGEN SATURATION: 94 % | HEART RATE: 73 BPM | DIASTOLIC BLOOD PRESSURE: 67 MMHG | RESPIRATION RATE: 20 BRPM | TEMPERATURE: 97.5 F

## 2017-11-18 VITALS
TEMPERATURE: 98.1 F | SYSTOLIC BLOOD PRESSURE: 161 MMHG | HEART RATE: 71 BPM | DIASTOLIC BLOOD PRESSURE: 70 MMHG | OXYGEN SATURATION: 93 % | RESPIRATION RATE: 20 BRPM

## 2017-11-18 VITALS
OXYGEN SATURATION: 93 % | HEART RATE: 79 BPM | DIASTOLIC BLOOD PRESSURE: 66 MMHG | SYSTOLIC BLOOD PRESSURE: 125 MMHG | RESPIRATION RATE: 18 BRPM | TEMPERATURE: 97.6 F

## 2017-11-18 VITALS
DIASTOLIC BLOOD PRESSURE: 66 MMHG | TEMPERATURE: 98 F | RESPIRATION RATE: 20 BRPM | SYSTOLIC BLOOD PRESSURE: 127 MMHG | HEART RATE: 71 BPM | OXYGEN SATURATION: 96 %

## 2017-11-18 VITALS
OXYGEN SATURATION: 95 % | HEART RATE: 73 BPM | RESPIRATION RATE: 20 BRPM | DIASTOLIC BLOOD PRESSURE: 77 MMHG | TEMPERATURE: 98.6 F | SYSTOLIC BLOOD PRESSURE: 143 MMHG

## 2017-11-18 VITALS
TEMPERATURE: 98.4 F | HEART RATE: 72 BPM | SYSTOLIC BLOOD PRESSURE: 105 MMHG | OXYGEN SATURATION: 94 % | DIASTOLIC BLOOD PRESSURE: 56 MMHG | RESPIRATION RATE: 18 BRPM

## 2017-11-18 VITALS — OXYGEN SATURATION: 92 %

## 2017-11-18 VITALS — HEART RATE: 68 BPM

## 2017-11-18 LAB
ANION GAP SERPL CALC-SCNC: 10 MEQ/L (ref 5–15)
BUN SERPL-MCNC: 11 MG/DL (ref 7–18)
CHLORIDE SERPL-SCNC: 105 MEQ/L (ref 98–107)
ERYTHROCYTE [DISTWIDTH] IN BLOOD BY AUTOMATED COUNT: 31.7 % (ref 11.6–17.2)
FERRITIN SERPL-MCNC: 8 NG/ML (ref 26–388)
GFR SERPLBLD BASED ON 1.73 SQ M-ARVRAT: 56 ML/MIN (ref 89–?)
HCO3 BLD-SCNC: 25.4 MEQ/L (ref 21–32)
HCT VFR BLD CALC: 27.1 % (ref 39–51)
MCH RBC QN AUTO: 19.9 PG (ref 27–34)
MCHC RBC AUTO-ENTMCNC: 31.3 % (ref 32–36)
MCV RBC AUTO: 63.7 FL (ref 80–100)
PLATELET # BLD: 302 TH/MM3 (ref 150–450)
POTASSIUM SERPL-SCNC: 3.6 MEQ/L (ref 3.5–5.1)
RBC # BLD AUTO: 4.25 MIL/MM3 (ref 4.5–5.9)
REVIEW FLAG: (no result)
SODIUM SERPL-SCNC: 140 MEQ/L (ref 136–145)
TRANSFERRIN IRON PROFILE: 318 MG/DL (ref 200–360)
WBC # BLD AUTO: 8.6 TH/MM3 (ref 4–11)

## 2017-11-18 RX ADMIN — IPRATROPIUM BROMIDE AND ALBUTEROL SULFATE SCH AMPULE: .5; 3 SOLUTION RESPIRATORY (INHALATION) at 15:44

## 2017-11-18 RX ADMIN — IPRATROPIUM BROMIDE AND ALBUTEROL SULFATE SCH AMPULE: .5; 3 SOLUTION RESPIRATORY (INHALATION) at 08:26

## 2017-11-18 RX ADMIN — IPRATROPIUM BROMIDE AND ALBUTEROL SULFATE SCH AMPULE: .5; 3 SOLUTION RESPIRATORY (INHALATION) at 21:15

## 2017-11-18 RX ADMIN — STANDARDIZED SENNA CONCENTRATE AND DOCUSATE SODIUM SCH TAB: 8.6; 5 TABLET, FILM COATED ORAL at 10:17

## 2017-11-18 RX ADMIN — METOPROLOL SUCCINATE SCH MG: 25 TABLET, EXTENDED RELEASE ORAL at 14:06

## 2017-11-18 RX ADMIN — SACUBITRIL AND VALSARTAN SCH TAB: 24; 26 TABLET, FILM COATED ORAL at 21:45

## 2017-11-18 RX ADMIN — IPRATROPIUM BROMIDE AND ALBUTEROL SULFATE SCH AMPULE: .5; 3 SOLUTION RESPIRATORY (INHALATION) at 03:57

## 2017-11-18 RX ADMIN — ASPIRIN SCH MG: 325 TABLET ORAL at 10:17

## 2017-11-18 RX ADMIN — STANDARDIZED SENNA CONCENTRATE AND DOCUSATE SODIUM SCH TAB: 8.6; 5 TABLET, FILM COATED ORAL at 21:45

## 2017-11-18 RX ADMIN — Medication SCH ML: at 21:45

## 2017-11-18 RX ADMIN — Medication SCH ML: at 09:00

## 2017-11-18 NOTE — HHI.GIFU
Subjective


Remarks


Pt sitting on edge of bed, eating lunch.  He is concerned about being 

discharged with low hgb.  No physical complaints. 


 (Estephanie Sparrow)





Objective


Vitals I&O





Vital Signs








  Date Time  Temp Pulse Resp B/P (MAP) Pulse Ox O2 Delivery O2 Flow Rate FiO2


 


11/18/17 08:27     96   21


 


11/18/17 08:00  71      


 


11/18/17 08:00      Room Air  


 


11/18/17 04:00 97.6 79 18 125/66 (85) 93   


 


11/18/17 03:59     92 Nasal Cannula 2.50 


 


11/18/17 00:00 98.4 72 18 105/56 (72) 94   


 


11/17/17 20:00 98.3 79 22 132/63 (86) 97   


 


11/17/17 20:00      Room Air  


 


11/17/17 20:00  74      


 


11/17/17 16:00 98.0 69 16 128/60 (82) 96   


 


11/17/17 15:34     97 Nasal Cannula 2.00 














I/O      


 


 11/17/17 11/17/17 11/17/17 11/18/17 11/18/17 11/18/17





 07:00 15:00 23:00 07:00 15:00 23:00


 


Intake Total 0 ml 1100 ml  480 ml  


 


Output Total 650 ml   150 ml  


 


Balance -650 ml 1100 ml  330 ml  


 


      


 


Intake Oral 0 ml   480 ml  


 


Packed Cells  400 ml    


 


Other  700 ml    


 


Output Urine Total 650 ml   150 ml  


 


# Voids   4   


 


# Bowel Movements 4   0  








Laboratory





Laboratory Tests








Test


  11/17/17


16:11 11/18/17


06:22


 


Hemoglobin 9.0  8.5 


 


Hematocrit 30.2  27.1 


 


White Blood Count  8.6 


 


Red Blood Count  4.25 


 


Mean Corpuscular Volume  63.7 


 


Mean Corpuscular Hemoglobin  19.9 


 


Mean Corpuscular Hemoglobin


Concent 


  31.3 


 


 


Red Cell Distribution Width  31.7 


 


Platelet Count  302 


 


Mean Platelet Volume  8.6 


 


Blood Urea Nitrogen  11 


 


Creatinine  1.26 


 


Random Glucose  90 


 


Calcium Level  8.4 


 


Sodium Level  140 


 


Potassium Level  3.6 


 


Chloride Level  105 


 


Carbon Dioxide Level  25.4 


 


Anion Gap  10 


 


Estimat Glomerular Filtration


Rate 


  56 


 


 


Iron Level  17 


 


Total Iron Binding Capacity  445 


 


Percent Iron Saturation  3.8 


 


Ferritin  8 


 


B-Type Natriuretic Peptide  1520 








Imaging





Last Impressions








Small Bowel X-Ray 11/17/17 0000 Signed





Impressions: 





 Service Date/Time:  Friday, November 17, 2017 17:14 - CONCLUSION: Moderate 





 hiatal hernia. The small bowel is unremarkable.     Alex Forman MD 


 


Neck Ultrasound 11/16/17 1346 Signed





Impressions: 





 Service Date/Time:  Thursday, November 16, 2017 14:05 - CONCLUSION:  1. 

Palpable 





 abnormality corresponds to prominent venous structures at the junction of the 





 internal and subclavian veins in the right neck. 2. No mass lesions.     Trace Meehan MD 


 


Chest X-Ray 11/16/17 0849 Signed





Impressions: 





 Service Date/Time:  Thursday, November 16, 2017 09:06 - CONCLUSION:  





 Cardiomegaly. Clear lungs. Hiatal hernia.     Gucci Mcdaniel Jr., MD 


 


Abdomen Ultrasound 11/16/17 0000 Signed





Impressions: 





 Service Date/Time:  Thursday, November 16, 2017 13:48 - CONCLUSION:  1. 6 x 8 

mm 





 nonobstructing stone in the lower pole collecting system of the right kidney. 

2. 





 Simple cortical cyst laterally in the midpole of the left kidney. 3. Pancreas 

is 





 partially obscured by overlying bowel gas. Visualized portions are 





 sonographically intact. 4. Otherwise negative.     Trace Meehan MD 








Physical Exam


HEENT: PERRL; normocephalic; atraumatic; no jaundice.   


CHEST:  CTA


CARDIAC:  RRR


ABDOMEN:  Soft, Protuberant, nontender; no hepatosplenomegaly; bowel sounds are 

present in all four quadrants.


EXTREMITIES: No clubbing, cyanosis, or edema.


SKIN:  Normal; no rash; no jaundice.


CNS:  No focal deficits; alert and oriented times three.


 (Estephanie Sparrow [facility])





Assessment and Plan


Plan


ASSESSMENT


- anemia - hgb 5.5 on admission, microcytic hypochromic. stool occult neg. no 

signs GI bleeding.  hx anemia requiring blood transfusion, had EGD 2006


    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  Colonoscopy 12 y ago in [facility] as work up for anemia and no 

bleeding found. 


    s/p EGD/colonoscopy 11/17/17 --> gastritis, hiatal hernia, hemorrhoids.   

SBFT moderate Hiatal hernia otherwise unremarkable. mild drop hgb today but


    no sign GIB.  pt will need capsul endoscopy to rule out small bowel source 

of bleed.  ?hematology consult


- elev troponins, CHF, COPD - 





PLAN


- await path


- capsule endoscopy as outpatient


- consider hematology consult


- monitor labs


- transfuse as necessary


- supportive care


- f/u with GI after d/c


THis pt seen by myself and Dr Sanchez and this note is written on his behalf


 (Estephaine Sparrow)


Physician Comments


Patient seen and examined


Agree with above


Continue with current supportive care


Monitor labs


If hemoglobin tomorrow is stable patient may be discharged from a GI standpoint 

follow-up as outpatient for outpatient capsule endoscopy


 (Joe Sanchez MD)











Estephanie Sparrow Nov 18, 2017 15:19


Joe Sanchez MD Nov 18, 2017 21:52

## 2017-11-18 NOTE — HHI.FPPN
Subjective


Remarks


No acute events overnight. Feels better today with improved shortness of breath

, although still gets short of breath with walking. Denies chest pain. Mild 

lower abdominal pain.


 (Donavan Gallego MD R2)





Objective


Vitals





Vital Signs








  Date Time  Temp Pulse Resp B/P (MAP) Pulse Ox O2 Delivery O2 Flow Rate FiO2


 


11/18/17 08:27     96   21


 


11/18/17 04:00 97.6 79 18 125/66 (85) 93   


 


11/18/17 03:59     92 Nasal Cannula 2.50 


 


11/18/17 00:00 98.4 72 18 105/56 (72) 94   


 


11/17/17 20:00 98.3 79 22 132/63 (86) 97   


 


11/17/17 20:00      Room Air  


 


11/17/17 20:00  74      


 


11/17/17 16:00 98.0 69 16 128/60 (82) 96   


 


11/17/17 15:34     97 Nasal Cannula 2.00 


 


11/17/17 15:15  72 16 125/63 (83) 97 Nasal Cannula 2 


 


11/17/17 15:00  74 16 128/60 (82) 97 Nasal Cannula 2 


 


11/17/17 14:42 97.5 72 16 114/57 (76) 94 Nasal Cannula 2 


 


11/17/17 12:00 98.1 80 18 133/70 (91) 95   


 


11/17/17 11:28  69      














I/O      


 


 11/17/17 11/17/17 11/17/17 11/18/17 11/18/17 11/18/17





 07:00 15:00 23:00 07:00 15:00 23:00


 


Intake Total 0 ml 1100 ml  480 ml  


 


Output Total 650 ml   150 ml  


 


Balance -650 ml 1100 ml  330 ml  


 


      


 


Intake Oral 0 ml   480 ml  


 


Packed Cells  400 ml    


 


Other  700 ml    


 


Output Urine Total 650 ml   150 ml  


 


# Voids   4   


 


# Bowel Movements 4   0  








 (Donavan Gallego MD R2)


Result Diagram:  


11/18/17 0622 11/18/17 0622





Other Results


Echocardiogram - LVEF 20-25% with severely reduced wall motion.


Imaging





Last Impressions








Small Bowel X-Ray 11/17/17 0000 Signed





Impressions: 





 Service Date/Time:  Friday, November 17, 2017 17:14 - CONCLUSION: Moderate 





 hiatal hernia. The small bowel is unremarkable.     Alex Forman MD 


 


Neck Ultrasound 11/16/17 1346 Signed





Impressions: 





 Service Date/Time:  Thursday, November 16, 2017 14:05 - CONCLUSION:  1. 

Palpable 





 abnormality corresponds to prominent venous structures at the junction of the 





 internal and subclavian veins in the right neck. 2. No mass lesions.     Trace Meehan MD 


 


Chest X-Ray 11/16/17 0849 Signed





Impressions: 





 Service Date/Time:  Thursday, November 16, 2017 09:06 - CONCLUSION:  





 Cardiomegaly. Clear lungs. Hiatal hernia.     Gucci Mcdaniel Jr., MD 


 


Abdomen Ultrasound 11/16/17 0000 Signed





Impressions: 





 Service Date/Time:  Thursday, November 16, 2017 13:48 - CONCLUSION:  1. 6 x 8 

mm 





 nonobstructing stone in the lower pole collecting system of the right kidney. 

2. 





 Simple cortical cyst laterally in the midpole of the left kidney. 3. Pancreas 

is 





 partially obscured by overlying bowel gas. Visualized portions are 





 sonographically intact. 4. Otherwise negative.     Trace Meehan MD 








Objective Remarks


GENERAL: This is a well-nourished, well-developed patient, in no apparent 

distress siting in bed


CARDIOVASCULAR: NRRR, normal S1/S2 . Soft 2/6 systolic murmur heard on 

auscultation at left upper sternal border .Regular rate and rhythm without, 

gallops, or rubs. 


RESPIRATORY: Crackles heard at lower lung bases BL. Breath sounds vesicular. No 

respiratory distress.


GASTROINTESTINAL: Abdomen soft, non-tender, distended. No hepato-splenomegaly, 

or palpable masses. No guarding.


MUSCULOSKELETAL: Extremities without clubbing, cyanosis, or edema. No joint 

tenderness, effusion, or edema noted. No calf tenderness.


NEUROLOGICAL: Awake and alert. Cranial nerves II through XII intact.  Motor and 

sensory grossly within normal limits. Normal speech.


Procedures


EGD & Colonoscopy - 11/17/17


Medications and IVs





Current Medications








 Medications


  (Trade)  Dose


 Ordered  Sig/Armen


 Route  Start Time


 Stop Time Status Last Admin


 


  (NS Flush)  2 ml  UNSCH  PRN


 IV FLUSH  11/16/17 12:30


    11/17/17 12:30


 


 


  (NS Flush)  2 ml  BID


 IV FLUSH  11/16/17 21:00


    11/18/17 09:00


 


 


  (Zofran Inj)  4 mg  Q6H  PRN


 IVP  11/16/17 12:30


     


 


 


  (Tylenol)  650 mg  Q6H  PRN


 PO  11/16/17 12:30


     


 


 


  (Morphine Inj)  2 mg  Q3H  PRN


 IV PUSH  11/16/17 12:30


     


 


 


  (Narcan Inj)  0.4 mg  UNSCH  PRN


 IV PUSH  11/16/17 12:30


     


 


 


  (Mariely-Colace)  1 tab  BID


 PO  11/16/17 21:00


    11/18/17 10:17


 


 


  (Milk Of


 Magnesia Liq)  30 ml  Q12H  PRN


 PO  11/16/17 12:30


     


 


 


  (Senokot)  17.2 mg  Q12H  PRN


 PO  11/16/17 12:30


     


 


 


  (Dulcolax Supp)  10 mg  DAILY  PRN


 RECTAL  11/16/17 12:30


     


 


 


  (Lactulose Liq)  30 ml  DAILY  PRN


 PO  11/16/17 12:30


     


 


 


  (Aspirin)  325 mg  DAILY


 PO  11/17/17 09:00


    11/18/17 10:17


 


 


  (Duoneb Neb)  1 ampule  Q6HR  NEB


 INH  11/17/17 02:00


    11/18/17 08:26


 


 


 Lactated Ringer's  1,000 ml @ 


 30 mls/hr  Q24H PRN


 IV  11/17/17 05:15


 11/20/17 05:14  11/17/17 12:30


 


 


 Sodium Chloride  500 ml @ 


 30 mls/hr  [id number] PRN


 IV  11/17/17 05:15


 11/20/17 05:14   


 


 


  (Lopressor)  25 mg  ON CALL  PRN


 PO  11/17/17 05:15


 11/20/17 05:14   


 


 


  (Betadine 5%


 Antisepsis Kit)  1 applic  ON CALL  PRN


 EACH NARE  11/17/17 05:15


 11/20/17 05:14   


 


 


  (Chlorhexidine


 2% Cloth)  3 pack  ON CALL  PRN


 TOPICAL  11/17/17 05:15


 11/20/17 05:14   


 


 


 Miscellaneous


 Information  ALL


 NURSING


 DEPARTME...  UNSCH  PRN


 .XX  11/17/17 15:30


 11/18/17 15:29   


 


 


  (Toprol Xl)  12.5 mg  DAILY


 PO  11/18/17 12:00


     


 


 


  (Entresto 24-26


 Mg)  1 tab  BID


 PO  11/18/17 21:00


     


 








 (Donavan Gallego MD R2)





A/P


Assessment and Plan


76 yo M with extensive cardiac hx s/p CABG and stent placement admitted for w/u 

of symptomatic anemia.





 sdw Dr. Capone  


 (Donavan Gallego MD R2)


Attending Attestation


Case reviewed and discussed with the resident team.  Agree with plan of care as 

discussed with me and documented in the resident note.


 (Portia Capone MD)


Problem List:  


(1) Symptomatic anemia


ICD Codes:  D64.9 - Anemia, unspecified


Status:  Acute


Plan:  Improved symptomatically today


H&H on admission found to 5.5/20.2


S/p 3 units of PRBCs Hgb increased appropriately to ~9


Hgb today AM 8.5


MCV ~60 suggesting blood loss or dietary deficiency as etiology 





-monitor and consider transfusing if hgb <8


-Check Ferritin, Iron profile


-Start oral iron 325 mg PO BID


-GI consulted, appreciate recommendations


   -EGD and Colonoscopy without bleeding source


   -Outpatient capsule endoscopy





(2) CHF (congestive heart failure)


ICD Codes:  I50.9 - Heart failure, unspecified


Status:  Acute


Plan:  EF 20-25%, not currently on CHF meds. Symptoms likely due to combination 

of anemia and CHF.





- Dietary Na restriction 2 gm, fluid restriction 2000 mL


- Lasix 20 mg PO daily, can give extra PRN based on symptoms


- Start metoprolol ER 12.5 mg daily


- Start Entresto low-dose BID


   -  to see if insurance will cover. If not switch to lisinopril low-dose 36 

hours after first Entresto dose


- Outpatient cardiology referral


- Continue ASA daily as below


- May benefit from statin therapy as well





(3) CAD (coronary artery disease)


ICD Codes:  I25.10 - Atherosclerotic heart disease of native coronary artery 

without angina pectoris


Status:  Chronic


Plan:  Symptomatically improved





- Consider statin therapy given CHF


- Manage CHF as above





(4) Shortness of breath


ICD Codes:  R06.02 - Shortness of breath


Status:  Resolved


Plan:  Increased sob most likely due to anemia plus CHF


CXR on 11/16 showed : cardiomegaly, clear lung and hiatal hernia


D-dimer negative





-c/w duoneb Q6H PRN breathing treatment 


-incentive spirometry


-manage CHF as above





(5) Nutrition, metabolism, and development symptoms


ICD Codes:  R63.8 - Other symptoms and signs concerning food and fluid intake


Plan:  Fluids: not indicated at this time


Electrolytes: WNL, replete as needed


Nutrition: Diet regular basic with 2 gm sodium restriction





DVT ppx: SCDs





Code status: Full code


 (Donavan Gallego MD R2)





Problem Qualifiers





(1) CHF (congestive heart failure):  


Qualified Codes:  I50.23 - Acute on chronic systolic (congestive) heart failure








Donavan Gallego MD R2 Nov 18, 2017 09:48


Portia Capone MD Nov 19, 2017 07:28

## 2017-11-19 VITALS
HEART RATE: 77 BPM | RESPIRATION RATE: 18 BRPM | TEMPERATURE: 98 F | DIASTOLIC BLOOD PRESSURE: 70 MMHG | OXYGEN SATURATION: 94 % | SYSTOLIC BLOOD PRESSURE: 118 MMHG

## 2017-11-19 VITALS
SYSTOLIC BLOOD PRESSURE: 126 MMHG | DIASTOLIC BLOOD PRESSURE: 58 MMHG | OXYGEN SATURATION: 94 % | TEMPERATURE: 97.5 F | HEART RATE: 77 BPM | RESPIRATION RATE: 20 BRPM

## 2017-11-19 VITALS — OXYGEN SATURATION: 95 %

## 2017-11-19 VITALS
TEMPERATURE: 97.7 F | HEART RATE: 73 BPM | DIASTOLIC BLOOD PRESSURE: 61 MMHG | SYSTOLIC BLOOD PRESSURE: 116 MMHG | OXYGEN SATURATION: 95 % | RESPIRATION RATE: 20 BRPM

## 2017-11-19 LAB
ANION GAP SERPL CALC-SCNC: 9 MEQ/L (ref 5–15)
BUN SERPL-MCNC: 13 MG/DL (ref 7–18)
CHLORIDE SERPL-SCNC: 106 MEQ/L (ref 98–107)
GFR SERPLBLD BASED ON 1.73 SQ M-ARVRAT: 48 ML/MIN (ref 89–?)
HCO3 BLD-SCNC: 22.3 MEQ/L (ref 21–32)
HCT VFR BLD CALC: 32.5 % (ref 39–51)
POTASSIUM SERPL-SCNC: 4.1 MEQ/L (ref 3.5–5.1)
REVIEW FLAG: (no result)
SODIUM SERPL-SCNC: 137 MEQ/L (ref 136–145)

## 2017-11-19 RX ADMIN — SACUBITRIL AND VALSARTAN SCH TAB: 24; 26 TABLET, FILM COATED ORAL at 08:28

## 2017-11-19 RX ADMIN — Medication SCH ML: at 08:29

## 2017-11-19 RX ADMIN — ASPIRIN SCH MG: 325 TABLET ORAL at 08:28

## 2017-11-19 RX ADMIN — STANDARDIZED SENNA CONCENTRATE AND DOCUSATE SODIUM SCH TAB: 8.6; 5 TABLET, FILM COATED ORAL at 08:28

## 2017-11-19 RX ADMIN — IPRATROPIUM BROMIDE AND ALBUTEROL SULFATE SCH AMPULE: .5; 3 SOLUTION RESPIRATORY (INHALATION) at 09:00

## 2017-11-19 RX ADMIN — METOPROLOL SUCCINATE SCH MG: 25 TABLET, EXTENDED RELEASE ORAL at 08:28

## 2017-11-19 RX ADMIN — IPRATROPIUM BROMIDE AND ALBUTEROL SULFATE SCH AMPULE: .5; 3 SOLUTION RESPIRATORY (INHALATION) at 04:00

## 2017-11-19 NOTE — HHI.DCPOC
Discharge Care Plan


Diagnosis:  


(1) CHF (congestive heart failure)


(2) Shortness of breath


(3) Anemia


Goals to Promote Your Health


* To prevent worsening of your condition and complications


* To maintain your health at the optimal level


Directions to Meet Your Goals


*** Take your medications as prescribed


*** Follow your dietary instruction


*** Follow activity as directed








*** Keep your appointments as scheduled


*** Take your immunizations and boosters as scheduled


*** If your symptoms worsen call your PCP, if no PCP go to Urgent Care Center 

or Emergency Room***


*** Smoking is Dangerous to Your Health. Avoid second hand smoke***


***Call the 24-hour hour crisis hotline for domestic abuse at 1-549.992.4729***











Christine Gamble MD, R1 Nov 19, 2017 10:35

## 2017-11-19 NOTE — HHI.FF
Face to Face Verification


Diagnosis:  


(1) CHF (congestive heart failure)


(2) Shortness of breath


Physical Therapy


Order:  Evaluate and Treat





Home Health Nursing








Order: Signs/symptoms of disease process





 CHF education

















I have seen patient Kavon Doyle on 11/19/17. My clinical findings support the 

need for the requested home health care services because:








 Patient has SOB














I certify that my clinical findings support that this patient is homebound 

because:








 Hx COPD- exertion dyspnea/weakness

















Christine Gamble MD, R1 Nov 19, 2017 10:28

## 2017-11-19 NOTE — HHI.FPPN
Subjective


Remarks


Patient seen and examined at bedside. No acute events overnight. Pt stated his 

breathing has improved. He was able to walk with physical therapy, although he 

had to pause from time to time in order to catch his breath. Denies CP or 

dizziness. Reports mild abdominal pain since colonoscopy procedure. Pt stated 

he was eating well.  


 (Christine Gamble MD, R1)





Objective


Vitals





Vital Signs








  Date Time  Temp Pulse Resp B/P (MAP) Pulse Ox O2 Delivery O2 Flow Rate FiO2


 


11/19/17 09:00     95   21


 


11/19/17 04:00 98.0 77 18 118/70 (86) 94   


 


11/19/17 00:00 97.7 73 20 116/61 (79) 95   


 


11/18/17 21:45      Room Air  


 


11/18/17 21:18     96   


 


11/18/17 20:11  68      


 


11/18/17 20:00      Room Air  


 


11/18/17 20:00 98.0 71 20 127/66 (86) 96   


 


11/18/17 16:00 97.5 73 20 141/67 (91) 94   


 


11/18/17 12:00 98.6 73 20 143/77 (99) 95   














I/O      


 


 11/18/17 11/18/17 11/18/17 11/19/17 11/19/17 11/19/17





 07:00 15:00 23:00 07:00 15:00 23:00


 


Intake Total 480 ml  750 ml 360 ml  


 


Output Total 150 ml     


 


Balance 330 ml  750 ml 360 ml  


 


      


 


Intake Oral 480 ml  750 ml 360 ml  


 


Output Urine Total 150 ml     


 


# Voids   4 6  


 


# Bowel Movements 0  0 0  








 (Christine Gamble MD, R1)


Result Diagram:  


11/18/17 0622                                                                  

              11/18/17 0622





Imaging





Last Impressions








Small Bowel X-Ray 11/17/17 0000 Signed





Impressions: 





 Service Date/Time:  Friday, November 17, 2017 17:14 - CONCLUSION: Moderate 





 hiatal hernia. The small bowel is unremarkable.     Alex Forman MD 


 


Neck Ultrasound 11/16/17 1346 Signed





Impressions: 





 Service Date/Time:  Thursday, November 16, 2017 14:05 - CONCLUSION:  1. 

Palpable 





 abnormality corresponds to prominent venous structures at the junction of the 





 internal and subclavian veins in the right neck. 2. No mass lesions.     Trace Meehan MD 


 


Chest X-Ray 11/16/17 0849 Signed





Impressions: 





 Service Date/Time:  Thursday, November 16, 2017 09:06 - CONCLUSION:  





 Cardiomegaly. Clear lungs. Hiatal hernia.     Gucci Mcdaniel Jr., MD 


 


Abdomen Ultrasound 11/16/17 0000 Signed





Impressions: 





 Service Date/Time:  Thursday, November 16, 2017 13:48 - CONCLUSION:  1. 6 x 8 

mm 





 nonobstructing stone in the lower pole collecting system of the right kidney. 

2. 





 Simple cortical cyst laterally in the midpole of the left kidney. 3. Pancreas 

is 





 partially obscured by overlying bowel gas. Visualized portions are 





 sonographically intact. 4. Otherwise negative.     Trace Meehan MD 








Objective Remarks


GENERAL: This is a well-nourished, well-developed patient, in no apparent 

distress siting in bed


CARDIOVASCULAR: NRRR, normal S1/S2 . Soft 2/6 systolic murmur heard on 

auscultation at left upper sternal border .Regular rate and rhythm without, 

gallops, or rubs. 


RESPIRATORY: Breath sounds vesicular. No respiratory distress.


GASTROINTESTINAL: Abdomen soft, non-tender, distended. No hepato-splenomegaly, 

or palpable masses. No guarding.


MUSCULOSKELETAL: Extremities without clubbing, cyanosis, or edema. No joint 

tenderness, effusion, or edema noted. No calf tenderness.


NEUROLOGICAL: Awake and alert. Cranial nerves II through XII intact.  Motor and 

sensory grossly within normal limits. Normal speech.


Procedures


EGD & Colonoscopy - 11/17/17


 (Christine Gamble MD, R1)





A/P


Assessment and Plan


76 yo M with extensive cardiac hx s/p CABG and stent placement admitted for w/u 

of symptomatic anemia.





 sdw Dr. Capone  


 (Christine Gamble MD, R1)


Attending Attestation


Patient seen and examined.  Case reviewed and discussed with the resident team.

  Agree with plan of care as discussed with me and documented in the resident 

note.


 (Portia Capone MD)


Problem List:  


(1) Symptomatic anemia


ICD Codes:  D64.9 - Anemia, unspecified


Status:  Acute


Plan:  Improved symptomatically today


H&H on admission found to 5.5/20.2


S/p 3 units of PRBCs Hgb increased appropriately to ~9


Hgb today AM 9.7, has remained stable


MCV ~60 suggesting blood loss or dietary deficiency as etiology 





-c/w oral iron 325 mg PO BID


-GI consulted, appreciate recommendations


   -EGD and Colonoscopy without bleeding source


   -Outpatient capsule endoscopy





(2) CHF (congestive heart failure)


ICD Codes:  I50.9 - Heart failure, unspecified


Status:  Acute


Plan:  EF 20-25%, CHF meds started yesterday. Symptoms likely due to 

combination of anemia and CHF.





- Dietary Na restriction 2 gm, fluid restriction 2000 mL


- Lasix 20 mg PO daily, can give extra PRN based on symptoms


- c/w metoprolol ER 12.5 mg daily


- c/w Entresto low-dose BID


   - spoke CM in regards to insurance coverage, and CM stated patient insurance 

should cover cost but he may have a copay. 


- Outpatient cardiology referral


- Continue ASA daily as below


- May benefit from statin therapy as well





(3) CAD (coronary artery disease)


ICD Codes:  I25.10 - Atherosclerotic heart disease of native coronary artery 

without angina pectoris


Status:  Chronic


Plan:  Symptomatically improved





- Consider statin therapy given CHF


- Manage CHF as above





(4) Shortness of breath


ICD Codes:  R06.02 - Shortness of breath


Status:  Resolved


Plan:  Increased sob most likely due to anemia plus CHF


CXR on 11/16 showed : cardiomegaly, clear lung and hiatal hernia


D-dimer negative





-c/w duoneb Q6H PRN breathing treatment 


-incentive spirometry


-manage CHF as above





(5) Nutrition, metabolism, and development symptoms


ICD Codes:  R63.8 - Other symptoms and signs concerning food and fluid intake


Plan:  Fluids: not indicated at this time


Electrolytes: WNL, replete as needed


Nutrition: Diet regular basic with 2 gm sodium restriction





DVT ppx: SCDs





Code status: Full code


 (Christine Gamble MD, R1)





Problem Qualifiers





(1) CHF (congestive heart failure):  


Qualified Codes:  I50.23 - Acute on chronic systolic (congestive) heart failure








Christine Gamble MD, R1 Nov 19, 2017 09:41


Portia Capone MD Nov 20, 2017 09:10

## 2017-11-21 NOTE — HHI.DS
Discharge Summary


Admission Date


Nov 16, 2017 at 11:26


Discharge Date:  Nov 19, 2017


Admitting Diagnosis





Anemia, CHF





(1) Symptomatic anemia


Diagnosis:  Principal


Plan:  Improved symptomatically today


H&H on admission found to 5.5/20.2


S/p 3 units of PRBCs Hgb increased appropriately to ~9


Hgb today AM 9.7, has remained stable


MCV ~60 suggesting blood loss or dietary deficiency as etiology 





-c/w oral iron 325 mg PO BID


-GI consulted, appreciate recommendations


   -EGD and Colonoscopy without bleeding source


   -Outpatient capsule endoscopy


ICD Codes:  D64.9 - Anemia, unspecified


Status:  Acute


(2) CHF (congestive heart failure)


Diagnosis:  Principal


Plan:  EF 20-25%, CHF meds started yesterday. Symptoms likely due to 

combination of anemia and CHF.





- Dietary Na restriction 2 gm, fluid restriction 2000 mL


- Lasix 20 mg PO daily, can give extra PRN based on symptoms


- c/w metoprolol ER 12.5 mg daily


- c/w Entresto low-dose BID


   - spoke CM in regards to insurance coverage, and CM stated patient insurance 

should cover cost but he may have a copay. 


- Outpatient cardiology referral


- Continue ASA daily as below


- May benefit from statin therapy as well


ICD Codes:  I50.9 - Heart failure, unspecified


Status:  Acute


(3) CAD (coronary artery disease)


Diagnosis:  Secondary


Plan:  Symptomatically improved





- Consider statin therapy given CHF


- Manage CHF as above


ICD Codes:  I25.10 - Atherosclerotic heart disease of native coronary artery 

without angina pectoris


Status:  Chronic


(4) Shortness of breath


Diagnosis:  Secondary


Plan:  Increased sob most likely due to anemia plus CHF


CXR on 11/16 showed : cardiomegaly, clear lung and hiatal hernia


D-dimer negative





-c/w duoneb Q6H PRN breathing treatment 


-incentive spirometry


-manage CHF as above


ICD Codes:  R06.02 - Shortness of breath


Status:  Resolved


Consultants


Gi- Dr. Sanchez


Procedures


EGD & Colonoscopy - 11/17/17


Echo--11/17/17


Brief History


Patient is a 77-year-old male with extensive cardiac history status post CABG 

and stent placements x3 presented to the ED with complaints of progressively 

worsening shortness of breath on exertion and fatigue. Patient states that 

shortness of breath on exertion has been occurring during the past year but has 

worsen in the past 2 days to the point where he is not able to walk short 

distances without stopping to catch his breath. Patient states he has bandlike 

chest pain across the rib cage radiating to the back. Describes back pain as 

stabbing quality. Of note patient states that this chest pain is similar to 

pain he experienced before he had stents placed. Denies radiation of pain into 

his left jaw or left arm.  Patient also reports swelling in his lower 

extremities for the past 5 days. Denies weight loss, dizziness, vision changes, 

abdominal pain, nausea, vomiting, blood in his stool. Pt takes asa 325mg daily. 





Of note patient states last colonoscopy was 13 years ago and it was normal. He 

also stated that 13 years ago he was diagnosed with anemia (hgb 7.1) and 

required blood transfusion. The source of the bleeding was not identified.


CBC/BMP:  


11/19/17 0930                                                                  

              11/19/17 0930





Significant Findings





Laboratory Tests








Test


  11/19/17


09:30


 


Hemoglobin


  9.7 GM/DL


(13.0-17.0)


 


Hematocrit


  32.5 %


(39.0-51.0)


 


Creatinine


  1.44 MG/DL


(0.60-1.30)


 


Random Glucose


  120 MG/DL


()


 


Estimat Glomerular Filtration


Rate 48 ML/MIN


(>89)


 


B-Type Natriuretic Peptide


  1624 PG/ML


(0-100)








Imaging





Last Impressions








Small Bowel X-Ray 11/17/17 0000 Signed





Impressions: 





 Service Date/Time:  Friday, November 17, 2017 17:14 - CONCLUSION: Moderate 





 hiatal hernia. The small bowel is unremarkable.     Alex Forman MD 


 


Neck Ultrasound 11/16/17 1346 Signed





Impressions: 





 Service Date/Time:  Thursday, November 16, 2017 14:05 - CONCLUSION:  1. 

Palpable 





 abnormality corresponds to prominent venous structures at the junction of the 





 internal and subclavian veins in the right neck. 2. No mass lesions.     Trace Meehan MD 


 


Chest X-Ray 11/16/17 0849 Signed





Impressions: 





 Service Date/Time:  Thursday, November 16, 2017 09:06 - CONCLUSION:  





 Cardiomegaly. Clear lungs. Hiatal hernia.     Gucci Mcdaniel Jr., MD 


 


Abdomen Ultrasound 11/16/17 0000 Signed





Impressions: 





 Service Date/Time:  Thursday, November 16, 2017 13:48 - CONCLUSION:  1. 6 x 8 

mm 





 nonobstructing stone in the lower pole collecting system of the right kidney. 

2. 





 Simple cortical cyst laterally in the midpole of the left kidney. 3. Pancreas 

is 





 partially obscured by overlying bowel gas. Visualized portions are 





 sonographically intact. 4. Otherwise negative.     Trace Meehan MD 








PE at Discharge


GENERAL: This is a well-nourished, well-developed patient, in no apparent 

distress siting in bed


CARDIOVASCULAR: NRRR, normal S1/S2 . Soft 2/6 systolic murmur heard on 

auscultation at left upper sternal border .Regular rate and rhythm without, 

gallops, or rubs. 


RESPIRATORY: Breath sounds vesicular. No respiratory distress.


GASTROINTESTINAL: Abdomen soft, non-tender, distended. No hepato-splenomegaly, 

or palpable masses. No guarding.


MUSCULOSKELETAL: Extremities without clubbing, cyanosis, or edema. No joint 

tenderness, effusion, or edema noted. No calf tenderness.


NEUROLOGICAL: Awake and alert. Cranial nerves II through XII intact.  Motor and 

sensory grossly within normal limits. Normal speech.


Hospital Course


Mr. Doyle is a 77-year-old- Male with extensive cardiac hx s/p CABG and stent 

placement presented to the ED on 11/16/17 with complaints of symptomatic 

anemia. The workup during this admission revealed anemia with Hgb/Hct of 5.5/

20.2. CXR on admission showed cardiomegaly but lungs were clear. Pt received 2 

units of PRBCs the day of admission and 1 additional unit of PRBCs to maintain 

hgb >8 due to his cardiac hx. After the transfusions pt experienced sob and 

received lasix for diuresis with appropriate response. GI was consulted. EGD 

and colonoscopy procedures were done on 11/17/17 and showed normal esophagus, 

hiatal hernia, gastritis, no gastric ulcers, colon was unremarkable except for 

internal hemorrhoids. No active bleeding was found on EGD or colonoscopy. 

Biopsies were taken from duodenum and stomach. Echo on 11/17/17 showed mild 

dilated left ventricle and EF of 20-25%. Pt was started on heart failure 

medications.Patient reported improvement of sob with exertion and he was able 

to work with physical therapy. Patient was transfused a total of 3 units of 

PRBCs and his H/H remained stable during  the rest the hospitalization. Pt was 

advised to follow-up with GI as out patient for capsule endoscopy procedure. 

Patient was hemodynamically stable upon discharge.


Pt Condition on Discharge:  Stable


Discharge Disposition:  Disch w/ Home Health Serv


Discharge Instructions


DIET: Follow Instructions for:  Heart Healthy Diet


Activities you can perform:  Weight Bearing as Uriel


Follow up Referrals:  


Cardiology - 1 Week


Gastroenterology - 1 Week with Joe Sanchez MD


PCP Follow-up - 1 Week If pt not able to see a pcp within the next wk please 

refer to [facility] medicine practice at [address] [facility], 

phone # [phone]





New Medications:  


Ferrous Sulfate (Ferrous Sulfate) 325 Mg (65 Mg Iron) Tablet


325 MG PO BIDPC for Nutritional Supplement, #60 TAB 0 Refills





Nebulizer (Nebulizer) 1 Mis Mis


EA .ROUTE AS DIRECTED for Breathing Treatment, #1 0 Refills





Furosemide (Furosemide) 20 Mg Tab


20 MG PO DAILY, #30 TAB





Metoprolol Succinate ER 24 HR (Metoprolol Succinate ER 24 HR) 25 Mg Tab


12.5 MG PO DAILY, #30 TAB 0 Refills





Sacubitril-Valsartan (Entresto) 24-26 Mg Tab


1 TAB PO BID, #60 TAB





[Albuterol-Ipratropium Neb] () 1 AMPULE NEBU


1 AMPULE INH Q6HR NEB, #120 0 Refills





 


Continued Medications:  


Aspirin (Aspirin) 325 Mg Tab


325 MG PO DAILY, #30 TAB 0 Refills

















Christine Gamble MD, R1 Nov 21, 2017 20:10

## 2018-02-10 ENCOUNTER — HOSPITAL ENCOUNTER (OUTPATIENT)
Dept: HOSPITAL 17 - NEPC | Age: 78
Setting detail: OBSERVATION
LOS: 1 days | Discharge: HOME | End: 2018-02-11
Attending: FAMILY MEDICINE | Admitting: FAMILY MEDICINE
Payer: OTHER GOVERNMENT

## 2018-02-10 VITALS
HEART RATE: 58 BPM | RESPIRATION RATE: 16 BRPM | SYSTOLIC BLOOD PRESSURE: 110 MMHG | DIASTOLIC BLOOD PRESSURE: 63 MMHG | OXYGEN SATURATION: 98 %

## 2018-02-10 VITALS
TEMPERATURE: 98.4 F | OXYGEN SATURATION: 98 % | HEART RATE: 68 BPM | SYSTOLIC BLOOD PRESSURE: 138 MMHG | DIASTOLIC BLOOD PRESSURE: 70 MMHG | RESPIRATION RATE: 20 BRPM

## 2018-02-10 VITALS
SYSTOLIC BLOOD PRESSURE: 135 MMHG | TEMPERATURE: 98.4 F | HEART RATE: 67 BPM | DIASTOLIC BLOOD PRESSURE: 66 MMHG | RESPIRATION RATE: 18 BRPM | OXYGEN SATURATION: 98 %

## 2018-02-10 VITALS
DIASTOLIC BLOOD PRESSURE: 70 MMHG | OXYGEN SATURATION: 98 % | RESPIRATION RATE: 16 BRPM | SYSTOLIC BLOOD PRESSURE: 114 MMHG | HEART RATE: 54 BPM

## 2018-02-10 VITALS — RESPIRATION RATE: 20 BRPM | OXYGEN SATURATION: 98 %

## 2018-02-10 VITALS — WEIGHT: 220.46 LBS | HEIGHT: 66 IN | BODY MASS INDEX: 35.43 KG/M2

## 2018-02-10 DIAGNOSIS — I11.0: ICD-10-CM

## 2018-02-10 DIAGNOSIS — I50.9: Primary | ICD-10-CM

## 2018-02-10 DIAGNOSIS — I25.2: ICD-10-CM

## 2018-02-10 DIAGNOSIS — F43.10: ICD-10-CM

## 2018-02-10 DIAGNOSIS — Z95.5: ICD-10-CM

## 2018-02-10 DIAGNOSIS — D64.9: ICD-10-CM

## 2018-02-10 DIAGNOSIS — R53.1: ICD-10-CM

## 2018-02-10 DIAGNOSIS — Z87.891: ICD-10-CM

## 2018-02-10 DIAGNOSIS — Z66: ICD-10-CM

## 2018-02-10 DIAGNOSIS — I25.10: ICD-10-CM

## 2018-02-10 DIAGNOSIS — R01.1: ICD-10-CM

## 2018-02-10 DIAGNOSIS — Z86.73: ICD-10-CM

## 2018-02-10 DIAGNOSIS — J44.9: ICD-10-CM

## 2018-02-10 DIAGNOSIS — Z95.1: ICD-10-CM

## 2018-02-10 LAB
ALBUMIN SERPL-MCNC: 3.4 GM/DL (ref 3.4–5)
ALP SERPL-CCNC: 61 U/L (ref 45–117)
ALT SERPL-CCNC: 21 U/L (ref 12–78)
AST SERPL-CCNC: 17 U/L (ref 15–37)
BASOPHILS # BLD AUTO: 0.1 TH/MM3 (ref 0–0.2)
BASOPHILS NFR BLD: 0.9 % (ref 0–2)
BILIRUB SERPL-MCNC: 0.2 MG/DL (ref 0.2–1)
BUN SERPL-MCNC: 26 MG/DL (ref 7–18)
CALCIUM SERPL-MCNC: 7.8 MG/DL (ref 8.5–10.1)
CHLORIDE SERPL-SCNC: 108 MEQ/L (ref 98–107)
CREAT SERPL-MCNC: 1.54 MG/DL (ref 0.6–1.3)
EOSINOPHIL # BLD: 0.4 TH/MM3 (ref 0–0.4)
EOSINOPHIL NFR BLD: 6 % (ref 0–4)
ERYTHROCYTE [DISTWIDTH] IN BLOOD BY AUTOMATED COUNT: 16.8 % (ref 11.6–17.2)
GFR SERPLBLD BASED ON 1.73 SQ M-ARVRAT: 44 ML/MIN (ref 89–?)
GLUCOSE SERPL-MCNC: 109 MG/DL (ref 74–106)
HCO3 BLD-SCNC: 24.2 MEQ/L (ref 21–32)
HCT VFR BLD CALC: 34.8 % (ref 39–51)
HGB BLD-MCNC: 11.4 GM/DL (ref 13–17)
INR PPP: 1 RATIO
LYMPHOCYTES # BLD AUTO: 1.7 TH/MM3 (ref 1–4.8)
LYMPHOCYTES NFR BLD AUTO: 26 % (ref 9–44)
MAGNESIUM SERPL-MCNC: 2.5 MG/DL (ref 1.5–2.5)
MCH RBC QN AUTO: 27 PG (ref 27–34)
MCHC RBC AUTO-ENTMCNC: 32.9 % (ref 32–36)
MCV RBC AUTO: 82 FL (ref 80–100)
MONOCYTE #: 0.8 TH/MM3 (ref 0–0.9)
MONOCYTES NFR BLD: 13 % (ref 0–8)
NEUTROPHILS # BLD AUTO: 3.5 TH/MM3 (ref 1.8–7.7)
NEUTROPHILS NFR BLD AUTO: 54.1 % (ref 16–70)
PLATELET # BLD: 280 TH/MM3 (ref 150–450)
PMV BLD AUTO: 7 FL (ref 7–11)
PROT SERPL-MCNC: 6.8 GM/DL (ref 6.4–8.2)
PROTHROMBIN TIME: 10.3 SEC (ref 9.8–11.6)
RBC # BLD AUTO: 4.24 MIL/MM3 (ref 4.5–5.9)
SODIUM SERPL-SCNC: 140 MEQ/L (ref 136–145)
TROPONIN I SERPL-MCNC: 0.04 NG/ML (ref 0.02–0.05)
WBC # BLD AUTO: 6.6 TH/MM3 (ref 4–11)

## 2018-02-10 PROCEDURE — 80053 COMPREHEN METABOLIC PANEL: CPT

## 2018-02-10 PROCEDURE — 83735 ASSAY OF MAGNESIUM: CPT

## 2018-02-10 PROCEDURE — 97161 PT EVAL LOW COMPLEX 20 MIN: CPT

## 2018-02-10 PROCEDURE — 85027 COMPLETE CBC AUTOMATED: CPT

## 2018-02-10 PROCEDURE — 85730 THROMBOPLASTIN TIME PARTIAL: CPT

## 2018-02-10 PROCEDURE — 94664 DEMO&/EVAL PT USE INHALER: CPT

## 2018-02-10 PROCEDURE — 85025 COMPLETE CBC W/AUTO DIFF WBC: CPT

## 2018-02-10 PROCEDURE — G0378 HOSPITAL OBSERVATION PER HR: HCPCS

## 2018-02-10 PROCEDURE — 96372 THER/PROPH/DIAG INJ SC/IM: CPT

## 2018-02-10 PROCEDURE — 99285 EMERGENCY DEPT VISIT HI MDM: CPT

## 2018-02-10 PROCEDURE — 71045 X-RAY EXAM CHEST 1 VIEW: CPT

## 2018-02-10 PROCEDURE — 80048 BASIC METABOLIC PNL TOTAL CA: CPT

## 2018-02-10 PROCEDURE — 87804 INFLUENZA ASSAY W/OPTIC: CPT

## 2018-02-10 PROCEDURE — 81001 URINALYSIS AUTO W/SCOPE: CPT

## 2018-02-10 PROCEDURE — 94640 AIRWAY INHALATION TREATMENT: CPT

## 2018-02-10 PROCEDURE — 85610 PROTHROMBIN TIME: CPT

## 2018-02-10 PROCEDURE — 93005 ELECTROCARDIOGRAM TRACING: CPT

## 2018-02-10 PROCEDURE — 83880 ASSAY OF NATRIURETIC PEPTIDE: CPT

## 2018-02-10 PROCEDURE — 84484 ASSAY OF TROPONIN QUANT: CPT

## 2018-02-10 RX ADMIN — IPRATROPIUM BROMIDE AND ALBUTEROL SULFATE SCH AMPULE: .5; 3 SOLUTION RESPIRATORY (INHALATION) at 22:38

## 2018-02-10 NOTE — PD
Physical Exam


Date Seen by Provider:  Feb 10, 2018


Time Seen by Provider:  19:28


Narrative


Accepted in transfer of care from Dr. thomas





Data


Data


Last Documented VS





Vital Signs








  Date Time  Temp Pulse Resp B/P (MAP) Pulse Ox O2 Delivery O2 Flow Rate FiO2


 


2/10/18 20:45  58 16 110/63 (79) 98 Room Air  


 


2/10/18 18:21       2.00 


 


2/10/18 18:17 98.4       








Orders





 Orders


Electrocardiogram (2/10/18 18:17)


B-Type Natriuretic Peptide (2/10/18 18:17)


Complete Blood Count With Diff (2/10/18 18:17)


Comprehensive Metabolic Panel (2/10/18 18:17)


Magnesium (Mg) (2/10/18 18:17)


Prothrombin Time / Inr (Pt) (2/10/18 18:17)


Act Partial Throm Time (Ptt) (2/10/18 18:17)


Troponin I (2/10/18 18:17)


Chest, Single Ap (2/10/18 18:17)


Ecg Monitoring (2/10/18 18:17)


Bilateral Bp Monitoring (2/10/18 18:17)


Iv Access Insert/Monitor (2/10/18 18:17)


Oximetry (2/10/18 18:17)


Oxygen Administration (2/10/18 18:17)


Sodium Chloride 0.9% Flush (Ns Flush) (2/10/18 18:30)


Influenzae A/B Antigen (2/10/18 18:17)


Admit Order (Ed Use Only) (2/10/18 )


Cardiac Monitor / Telemetry JOSE [email or web address] (2/10/18 21:41)


Activity Oob With Assistance (2/10/18 21:41)


Notify Dr: Other (2/10/18 21:41)





Labs





Laboratory Tests








Test


  2/10/18


17:20


 


White Blood Count 6.6 TH/MM3 


 


Red Blood Count 4.24 MIL/MM3 


 


Hemoglobin 11.4 GM/DL 


 


Hematocrit 34.8 % 


 


Mean Corpuscular Volume 82.0 FL 


 


Mean Corpuscular Hemoglobin 27.0 PG 


 


Mean Corpuscular Hemoglobin


Concent 32.9 % 


 


 


Red Cell Distribution Width 16.8 % 


 


Platelet Count 280 TH/MM3 


 


Mean Platelet Volume 7.0 FL 


 


Neutrophils (%) (Auto) 54.1 % 


 


Lymphocytes (%) (Auto) 26.0 % 


 


Monocytes (%) (Auto) 13.0 % 


 


Eosinophils (%) (Auto) 6.0 % 


 


Basophils (%) (Auto) 0.9 % 


 


Neutrophils # (Auto) 3.5 TH/MM3 


 


Lymphocytes # (Auto) 1.7 TH/MM3 


 


Monocytes # (Auto) 0.8 TH/MM3 


 


Eosinophils # (Auto) 0.4 TH/MM3 


 


Basophils # (Auto) 0.1 TH/MM3 


 


CBC Comment AUTO DIFF 


 


Differential Comment


  AUTO DIFF


CONFIRMED


 


Prothrombin Time 10.3 SEC 


 


Prothromb Time International


Ratio 1.0 RATIO 


 


 


Activated Partial


Thromboplast Time 24.5 SEC 


 


 


Blood Urea Nitrogen 26 MG/DL 


 


Creatinine 1.54 MG/DL 


 


Random Glucose 109 MG/DL 


 


Total Protein 6.8 GM/DL 


 


Albumin 3.4 GM/DL 


 


Calcium Level 7.8 MG/DL 


 


Magnesium Level 2.5 MG/DL 


 


Alkaline Phosphatase 61 U/L 


 


Aspartate Amino Transf


(AST/SGOT) 17 U/L 


 


 


Alanine Aminotransferase


(ALT/SGPT) 21 U/L 


 


 


Total Bilirubin 0.2 MG/DL 


 


Sodium Level 140 MEQ/L 


 


Potassium Level 3.6 MEQ/L 


 


Chloride Level 108 MEQ/L 


 


Carbon Dioxide Level 24.2 MEQ/L 


 


Anion Gap 8 MEQ/L 


 


Estimat Glomerular Filtration


Rate 44 ML/MIN 


 


 


Troponin I 0.04 NG/ML 


 


B-Type Natriuretic Peptide 212 PG/ML 











[facility]


Medical Record Reviewed:  Yes


Supervised Visit with JOSEPH:  No


Interpretation(s)


bnp: 212; trop: 0.04, noty elevated


EKG normal sinus rhythm rate 68 QS inferiorly age-indeterminate no acute ST 

elevation nonspecific ST segment flattening/mild depression laterally


Last Impressions








Chest X-Ray 2/10/18 1817 Signed





Impressions: 





 Service Date/Time:  Saturday, February 10, 2018 18:46 - CONCLUSION:  No acute 





 findings.  No focal infiltrates seen.     Gucci Lew MD 





CBC & BMP Diagram


2/10/18 17:20








Total Protein 6.8, Albumin 3.4, Calcium Level 7.8 L, Magnesium Level 2.5, 

Alkaline Phosphatase 61, Aspartate Amino Transf (AST/SGOT) 17, Alanine 

Aminotransferase (ALT/SGPT) 21, Total Bilirubin 0.2








Vital Signs








  Date Time  Temp Pulse Resp B/P (MAP) Pulse Ox O2 Delivery O2 Flow Rate FiO2


 


2/10/18 20:45  58 16 110/63 (79) 98 Room Air  


 


2/10/18 19:30  54 16 114/70 (85) 98 Room Air  


 


2/10/18 18:21   20  98 Nasal Cannula 2.00 


 


2/10/18 18:20     97 Nasal Cannula 2.00 


 


2/10/18 18:17 98.4 68 20 138/70 (92) 98 Nasal Cannula 2.00 


 


2/10/18 18:17  68 20  98 Nasal Cannula 2.00 


 


2/10/18 18:10 98.4 67 18 135/66 (89) 98   








Differential Diagnosis


Accepted in transfer of care from Dr. Gonzalez; please refer to his dictation


Narrative Course


Accepted in transfer of care from Dr. Gonzalez; follow up pending diagnostics --rec 

plan for admission


Labs resulted and found to be grossly within normal range except for renal 

insufficiency and mild elevation of BNP of 212 troponin I is within normal 

range at 0.04; chest x-ray reveals no acute process; flu test is negative


Patient feels well although not back to his normal is aware of plan for 

observation admission and is agreeable to this


Patient's case discussed with on-call Resident service will admit to Dr Martinez

's service per Dr Pack


Physician Communication


Physician Communication


discussed with Dr Pack --obs to Dr Martinez's service





Diagnosis





 Primary Impression:  


 Generalized weakness


 Additional Impressions:  


 Chest pain


 CAD (coronary artery disease)





Admitting Information


Admitting Physician Requests:  Observation











Bailey Barfield MD Feb 10, 2018 19:29

## 2018-02-10 NOTE — PD
HPI


Chief Complaint:  Cardiac Complaint


Time Seen by Provider:  18:16


Travel History


International Travel<30 days:  No


Contact w/Intl Traveler<30days:  No


Traveled to known affect area:  No





History of Present Illness


HPI


There is a 78-year-old man who presents to the emergency department complaining 

of weakness and chest pain.  He was admitted with anemia and CHF exacerbation 

in November.  He said since that time he has had left-sided chest pain is been 

fairly persistent.  Symptoms are constant, not really wax or wane with 

exertion.  He states over the past week is gotten progressively more weak.  He 

had 2 falls yesterday.  He felt progressively more weak and short of breath 

today and so he called the ambulance.  He has had a little bit of slight cough.

  He has not had any other symptoms.  H he states when he was previously 

admitted that did not find the source of the anemia.  His hemoglobin was 5.5 at 

that time with microcytic indices.  Endoscopy was negative.  Done well since 

then.





History


Past Medical History


*** Narrative Medical


CAD, history of CABG


CHF


COPD


History of TIA


Nocturia





Social History


Alcohol Use:  No (QUIT 40YRS AGO)


Tobacco Use:  No





Allergies-Medications


(Allergen,Severity, Reaction):  


Coded Allergies:  


     No Known Allergies (Verified  Allergy, Unknown, 11/16/17)


Reported Meds & Prescriptions





Reported Meds & Active Scripts


Active


Nebulizer 1 Mis Mis Ea .ROUTE AS DIRECTED


[Albuterol-Ipratropium Neb] 1 AMPULE Nebu 1 Ampule INH Q6HR NEB


Furosemide 20 Mg Tab 20 Mg PO DAILY


Entresto (Sacubitril-Valsartan) 24-26 Mg Tab 1 Tab PO BID


Reported


Pantoprazole (Pantoprazole Sodium) 40 Mg Tab 40 Mg PO DAILY


Metoprolol Tartrate 25 Mg Tab 25 Mg PO DAILY


Simvastatin 20 Mg Tab 20 Mg PO DAILY


Aspirin 325 Mg Tab 325 Mg PO DAILY








Review of Systems


Except as stated in HPI:  all other systems reviewed are Neg





Physical Exam


Narrative


GENERAL: 78-year-old man, no acute distress.  Generally well-appearing.


SKIN: Focused skin assessment warm/dry.


HEAD: Atraumatic. Normocephalic. 


EYES: Pupils equal and round. No scleral icterus. No injection or drainage. 


ENT: No nasal bleeding or discharge.  Mucous membranes pink and moist.


NECK: Trachea midline. No JVD. 


CARDIOVASCULAR: Regular rate and rhythm.  Fairly prominent systolic heart 

murmur on the right sternal border.  No murmur appreciated.


RESPIRATORY: No accessory muscle use. Clear to auscultation. Breath sounds 

equal bilaterally. 


GASTROINTESTINAL: Abdomen soft, non-tender, nondistended. Hepatic and splenic 

margins not palpable. 


MUSCULOSKELETAL: No obvious deformities. Trace edema.


NEUROLOGICAL: Awake and alert. No obvious cranial nerve deficits.  Motor 

grossly within normal limits. Normal speech.


PSYCHIATRIC: Appropriate mood and affect; insight and judgment normal.





Data


Data


Last Documented VS





Vital Signs








  Date Time  Temp Pulse Resp B/P (MAP) Pulse Ox O2 Delivery O2 Flow Rate FiO2


 


2/10/18 18:21   20  98 Nasal Cannula 2.00 


 


2/10/18 18:17 98.4 68      








Orders





 Orders


Electrocardiogram (2/10/18 18:17)


B-Type Natriuretic Peptide (2/10/18 18:17)


Complete Blood Count With Diff (2/10/18 18:17)


Comprehensive Metabolic Panel (2/10/18 18:17)


Magnesium (Mg) (2/10/18 18:17)


Prothrombin Time / Inr (Pt) (2/10/18 18:17)


Act Partial Throm Time (Ptt) (2/10/18 18:17)


Troponin I (2/10/18 18:17)


Chest, Single Ap (2/10/18 18:17)


Ecg Monitoring (2/10/18 18:17)


Bilateral Bp Monitoring (2/10/18 18:17)


Iv Access Insert/Monitor (2/10/18 18:17)


Oximetry (2/10/18 18:17)


Oxygen Administration (2/10/18 18:17)


Sodium Chloride 0.9% Flush (Ns Flush) (2/10/18 18:30)


Influenzae A/B Antigen (2/10/18 18:17)





Labs





Laboratory Tests








Test


  2/10/18


17:20


 


White Blood Count 6.6 TH/MM3 


 


Red Blood Count 4.24 MIL/MM3 


 


Hemoglobin 11.4 GM/DL 


 


Hematocrit 34.8 % 


 


Mean Corpuscular Volume 82.0 FL 


 


Mean Corpuscular Hemoglobin 27.0 PG 


 


Mean Corpuscular Hemoglobin


Concent 32.9 % 


 


 


Red Cell Distribution Width 16.8 % 


 


Platelet Count 280 TH/MM3 


 


Mean Platelet Volume 7.0 FL 


 


Neutrophils (%) (Auto) 54.1 % 


 


Lymphocytes (%) (Auto) 26.0 % 


 


Monocytes (%) (Auto) 13.0 % 


 


Eosinophils (%) (Auto) 6.0 % 


 


Basophils (%) (Auto) 0.9 % 


 


Neutrophils # (Auto) 3.5 TH/MM3 


 


Lymphocytes # (Auto) 1.7 TH/MM3 


 


Monocytes # (Auto) 0.8 TH/MM3 


 


Eosinophils # (Auto) 0.4 TH/MM3 


 


Basophils # (Auto) 0.1 TH/MM3 


 


CBC Comment AUTO DIFF 


 


Prothrombin Time 10.3 SEC 


 


Prothromb Time International


Ratio 1.0 RATIO 


 


 


Activated Partial


Thromboplast Time 24.5 SEC 


 


 


Blood Urea Nitrogen 26 MG/DL 


 


Creatinine 1.54 MG/DL 


 


Random Glucose 109 MG/DL 


 


Albumin 3.4 GM/DL 


 


Calcium Level 7.8 MG/DL 


 


Magnesium Level 2.5 MG/DL 


 


Aspartate Amino Transf


(AST/SGOT) 17 U/L 


 


 


Alanine Aminotransferase


(ALT/SGPT) 21 U/L 


 


 


Sodium Level 140 MEQ/L 


 


Potassium Level 3.6 MEQ/L 


 


Chloride Level 108 MEQ/L 


 


Carbon Dioxide Level 24.2 MEQ/L 


 


Anion Gap 8 MEQ/L 


 


Estimat Glomerular Filtration


Rate 44 ML/MIN 


 











[facility]


Medical Decision Making


Medical Screen Exam Complete:  Yes


Emergency Medical Condition:  Yes


Interpretation(s)


My view of EKG: Normal sinus rhythm at a rate of 63, inferior Q waves, normal 

axis, lateral ST depressions and T-wave inversions.  No definite evidence of 

acute ischemia.


Differential Diagnosis


Pneumonia, anemia, weakness, aortic stenosis, ACS, other


Narrative Course


Medical decision making





Initial: 78-year-old man, presents to the emergency department with worsening 

weakness associated with some chest pain.  Prominent systolic murmur.  Looks 

generally well.  Dropped his blood pressure with nitroglycerin could suggest 

some aortic stenosis, hypovolemia, anemia.  Check labs, x-ray, EKG, likely 

admit for observation.











Juan Jose Gonzalez MD Feb 10, 2018 18:44

## 2018-02-10 NOTE — HHI.HP
[facility]


Service


Family Medicine


Primary Care Physician


Alan Mcclure M.D.


Admission Diagnosis





Diagnoses:  


International Travel<30 Days:  No


Contact w/Intl Traveler<30days:  No


Known Affected Area:  No


History of Present Illness


Patient is a 78 year old male with history of MI s/p CABG and stent placement 

as well as CHF who presents to the [facility] ED via EMS for evaluation of chest 

pain and generalized weakness. He reports a left-sided chest pain that he has 

been experiencing on and off for the past 15 years. He first experienced the 

sharp, non-radiating, 5/10 pain a few months after open heart surgery (

approximately 15 years ago). He states that the pain comes and goes but is 

present throughout the day, most days. The pain is different than the pain the 

patient felt when suffering MI.  He denies any associated symptoms such as 

diaphoresis, shortness of breath, nausea and vomiting. The patient says that 

nothing makes the pain better or worse. Nitroglycerin, given by EMS, did not 

alleviate symptoms. 





The patient reports a generalized weakness, which he has been experiencing on 

and off for the past year. The weakness has worsened over the past few weeks. 

The patient states that when he stands up from a seated position, he feels like 

his legs are going to give out, he has to brace himself and hold on to 

furniture to move around the house. Outside the house, he uses a walker. 

Patient reports several falls yesterday and one this evening. He did not hit 

his head or loose consciousness. He denies dizziness. 





The patient reports worsening shortness of breath with exertion over the past 

few weeks; patient even experiences temporary shortness of breath when sitting 

up from supine position. He has been using his nebulizer x4/day. He has had a 

dry, productive cough since November. He denies fever but reports chills. He 

denies nasal congestion. He reports minimal throat pain and hoarseness. 





Of note, patient was hospitalized with anemia (hemoglobin 5.5) and CHF 

exacerbation in November. Patient states that no cause of anemia was identified 

during visit. ECHO at that time showed ejection fraction in the range of 20-25%

. 


 (Tracy Gilliam MD R1)


History of Present Illness


Patient seen and examined during medicine rounds with the family medicine 

resident team this morning.  He states that he is feeling well and denies any 

chest pain or shortness of breath.  He continues to feel somewhat weak and 

fatigued when he stands but was able to work with physical therapy this morning 

without issue.  He says that his shortness of breath is something that is 

chronic but has slowly gotten worse over time.  He has been using a nebulizer 

at home 4 times daily.  He was able to tolerate breakfast this morning without 

issue and states that he feels relatively well.  He would like information 

about [facility]


 (Bill Lennon MD)





Review of Systems


Constitutional:  COMPLAINS OF: Fatigue, Chills, DENIES: Diaphoretic episodes, 

Fever, Change in appetite


Eyes:  COMPLAINS OF: Blurred vision (baseline blurry vision; worsening over 

last two weeks ), DENIES: Diplopia, Vision loss, Double Vision


Ears, nose, mouth, throat:  COMPLAINS OF: Vertigo (on and off for years ), 

Throat pain (minimal ), Hoarseness, DENIES: Tinnitus, Hearing loss, Nasal 

discharge, Ear Pain, Running Nose


Respiratory:  COMPLAINS OF: Cough (dry, sometimes productive), Sputum production

, Shortness of breath


Cardiovascular:  COMPLAINS OF: Chest pain, DENIES: Palpitations, Syncope, Lower 

Extremity Edema


Gastrointestinal:  COMPLAINS OF: Abdominal pain (baseline ), DENIES: Black 

stools, Bloody stools, Constipation, Diarrhea, Nausea, Vomiting


Genitourinary:  COMPLAINS OF: Nocturia, DENIES: Urinary frequency, Urinary 

incontinence, Testicular Swelling


Musculoskeletal:  COMPLAINS OF: Joint pain (baseline), Muscle aches (baseline )

, Stiffness


Integumentary:  DENIES: Nail changes, Rash


Hematologic/lymphatic:  DENIES: Bruising


Neurologic:  COMPLAINS OF: Headache (top of head with 'head numbness' ), Poor 

Balance, DENIES: Localized weakness, Paresthesias, Seizures, Speech Problems


Psychiatric:  COMPLAINS OF: Depression, Suicidal Ideation (once in awhile 

throughout life ) (Tracy Gilliam MD R1)





Past Family Social History


Past Medical History


History of TIA


Vertigo 


Carotid artery occlusion with stent placement 


CAD, history of CABG and stent placement 


CHF


COPD


Nocturia


PAD


HTN


Anemia 


PTSD


Distant history of alcohol use disorder


Past Surgical History


Cardiac stent placement - 2 years ago


carotid endarterectomy - 2006 


CABG - 


Eye implants - prior to CABG


Abdominal hernia repair - 30 years ago


Tonsillectomy and adenoidectomy in childhood


Reported Medications


Active


Nebulizer 1 Mis Mis Ea .ROUTE AS DIRECTED


[Albuterol-Ipratropium Neb] 1 AMPULE Nebu 1 Ampule INH Q6HR NEB


Furosemide 20 Mg Tab 20 Mg PO DAILY


Entresto (Sacubitril-Valsartan) 24-26 Mg Tab 1 Tab PO BID


Reported


Pantoprazole (Pantoprazole Sodium) 40 Mg Tab 40 Mg PO DAILY


Metoprolol Tartrate 25 Mg Tab 25 Mg PO DAILY


Simvastatin 20 Mg Tab 20 Mg PO DAILY


Aspirin 325 Mg Tab 325 Mg PO DAILY


 (Tracy Gilliam MD R1)


Allergies:  


Coded Allergies:  


     No Known Allergies (Verified  Allergy, Unknown, 17)


Active Ordered Medications





Current Medications








 Medications


  (Trade)  Dose


 Ordered  Sig/Armen


 Route  Start Time


 Stop Time Status Last Admin


 


  (NS Flush)  2 ml  UNSCH  PRN


 IVF  2/10/18 18:30


     


 


 


  (Aspirin)  325 mg  DAILY


 PO  18 09:00


     


 


 


  (Lasix)  20 mg  DAILY


 PO  18 09:00


     


 


 


  (Lopressor)  25 mg  DAILY


 PO  18 09:00


     


 


 


  (Protonix)  40 mg  DAILY


 PO  18 09:00


     


 


 


  (Entresto 24-26


 Mg)  1 tab  BID


 PO  18 09:00


     


 


 


  (Pravachol)  40 mg  DAILY


 PO  18 09:00


     


 


 


  (Duoneb Neb)  1 ampule  Q6HR  NEB


 NEB  2/10/18 22:45


    2/10/18 22:38


 


 


  (NS Flush)  2 ml  UNSCH  PRN


 IV FLUSH  2/10/18 22:45


     


 


 


  (NS Flush)  2 ml  BID


 IV FLUSH  18 09:00


     


 


 


  (Tylenol)  650 mg  Q4H  PRN


 PO  2/10/18 22:45


     


 


 


  (Heparin Inj)  5,000 units  Q8HR


 SQ  2/10/18 22:45


    18 00:40


 


 


  (Narcan Inj)  0.4 mg  UNSCH  PRN


 IV PUSH  2/10/18 22:45


     


 


 


  (Mariely-Colace)  1 tab  BID


 PO  18 09:00


     


 


 


  (Milk Of


 Magnesia Liq)  30 ml  Q12H  PRN


 PO  2/10/18 22:45


     


 


 


  (Senokot)  17.2 mg  Q12H  PRN


 PO  2/10/18 22:45


     


 


 


  (Dulcolax Supp)  10 mg  DAILY  PRN


 RECTAL  2/10/18 22:45


     


 


 


  (Lactulose Liq)  30 ml  DAILY  PRN


 PO  2/10/18 22:45


     


 


 


  (Albuterol Neb)  2.5 mg  Q2HR NEB  PRN


 INH  2/10/18 22:45


     


 








Family History


Mother -  at 81 years old; cardiac history


Father -  at 79 years old; colon cancer 


Brother - cardiac history 


Sister - thyroid disease


Social History


Alcohol - used to be heavy drinker; sober for over 52 years. 


Tobacco - used to smoke 1 pack/day; quit 50 years ago. Per record in 2015, 

patient quit in . 


Drug - None. 





Lives alone. Retired.  


 (Tracy Gilliam MD R1)





Physical Exam


Vital Signs





Vital Signs








  Date Time  Temp Pulse Resp B/P (MAP) Pulse Ox O2 Delivery O2 Flow Rate FiO2


 


2/10/18 19:30  54 16 114/70 (85) 98 Room Air  


 


2/10/18 18:21   20  98 Nasal Cannula 2.00 


 


2/10/18 18:20     97 Nasal Cannula 2.00 


 


2/10/18 18:17 98.4 68 20 138/70 (92) 98 Nasal Cannula 2.00 


 


2/10/18 18:17  68 20  98 Nasal Cannula 2.00 


 


2/10/18 18:10 98.4 67 18 135/66 (89) 98   








Physical Exam


GENERAL: This is a well-nourished, well-developed patient, in no apparent 

distress.


SKIN: Well-healed vertical incisions overlying sternum and horizontal incision 

over upper abdomen, midline. No rashes or ecchymoses noted. Warm and dry.


HEAD: Atraumatic. Normocephalic. 


EYES: Pupils equal round and reactive. Extraocular motions intact. No scleral 

icterus. No injection or drainage. 


ENT: Nose without bleeding, purulent drainage or septal hematoma. Throat 

without erythema, tonsillar hypertrophy or exudate. Airway patent.


NECK: Trachea midline. No JVD or lymphadenopathy. 


CARDIOVASCULAR: Regular rate and rhythm without murmurs, gallops, or rubs. 


RESPIRATORY: Audible wheezes. Inspiratory wheezes throughout left lung fields 

noted on auscultation. Right lung fields clear to auscultation. 


GASTROINTESTINAL: Abdomen soft, minimally tender upon palpation of right upper 

quadrant and left flank. Muscular defect noted along upper abdomen, midline 

incision site. No hepato-splenomegaly, or palpable masses. No guarding.


MUSCULOSKELETAL: Extremities without cyanosis, or edema. No joint tenderness, 

effusion, or edema noted. No calf tenderness. 


NEUROLOGICAL: Awake and alert. Cranial nerves II through XII intact.  Motor and 

sensory grossly within normal limits. Normal speech.


Laboratory





Laboratory Tests








Test


  2/10/18


17:20


 


White Blood Count 6.6 


 


Red Blood Count 4.24 


 


Hemoglobin 11.4 


 


Hematocrit 34.8 


 


Mean Corpuscular Volume 82.0 


 


Mean Corpuscular Hemoglobin 27.0 


 


Mean Corpuscular Hemoglobin


Concent 32.9 


 


 


Red Cell Distribution Width 16.8 


 


Platelet Count 280 


 


Mean Platelet Volume 7.0 


 


Neutrophils (%) (Auto) 54.1 


 


Lymphocytes (%) (Auto) 26.0 


 


Monocytes (%) (Auto) 13.0 


 


Eosinophils (%) (Auto) 6.0 


 


Basophils (%) (Auto) 0.9 


 


Neutrophils # (Auto) 3.5 


 


Lymphocytes # (Auto) 1.7 


 


Monocytes # (Auto) 0.8 


 


Eosinophils # (Auto) 0.4 


 


Basophils # (Auto) 0.1 


 


CBC Comment AUTO DIFF 


 


Differential Comment


  AUTO DIFF


CONFIRMED


 


Prothrombin Time 10.3 


 


Prothromb Time International


Ratio 1.0 


 


 


Activated Partial


Thromboplast Time 24.5 


 


 


Blood Urea Nitrogen 26 


 


Creatinine 1.54 


 


Random Glucose 109 


 


Total Protein 6.8 


 


Albumin 3.4 


 


Calcium Level 7.8 


 


Magnesium Level 2.5 


 


Alkaline Phosphatase 61 


 


Aspartate Amino Transf


(AST/SGOT) 17 


 


 


Alanine Aminotransferase


(ALT/SGPT) 21 


 


 


Total Bilirubin 0.2 


 


Sodium Level 140 


 


Potassium Level 3.6 


 


Chloride Level 108 


 


Carbon Dioxide Level 24.2 


 


Anion Gap 8 


 


Estimat Glomerular Filtration


Rate 44 


 


 


Troponin I 0.04 


 


B-Type Natriuretic Peptide 212 














 Date/Time


Source Procedure


Growth Status


 


 


 2/10/18 20:00


Nasal Washing Influenza Types A,B Antigen (DARIAN) - Final


NEGATIVE FOR FLU A AND B ANTIGEN.... Complete








 (Tracy Gilliam MD R1)


Physical Exam


GENERAL: Elderly and pleasant male, sitting up in bed in no obvious distress


SKIN: Well-healed vertical incisions overlying sternum and horizontal incision 

over upper abdomen, midline.


CARDIOVASCULAR: Regular rate and rhythm without murmurs, gallops, or rubs. 


RESPIRATORY: Clear to auscultation bilaterally with no increased work of 

breathing


GASTROINTESTINAL: Abdomen soft, nontender and nondistended


MUSCULOSKELETAL: Extremities without cyanosis, or edema.


NEUROLOGICAL: Awake and alert. 


 (Bill Lennon MD)


Result Diagram:  


2/10/18 1720                                                                   

             2/10/18 1720








Septic Shock Reassessment


Septic shock perfusion:  reassessment completed


 (Tracy Gilliam MD R1)





Caprini VTE Risk Assessment


Caprini VTE Risk Assessment:  Mod/High Risk (score >= 2)


Caprini Risk Assessment Model











 Point Value = 1          Point Value = 2  Point Value = 3  Point Value = 5


 


Age 41-60


Minor surgery


BMI > 25 kg/m2


Swollen legs


Varicose veins


Pregnancy or postpartum


History of unexplained or recurrent


   spontaneous 


Oral contraceptives or hormone


   replacement


Sepsis (< 1 month)


Serious lung disease, including


   pneumonia (< 1 month)


Abnormal pulmonary function


Acute myocardial infarction


Congestive heart failure (< 1 month)


History of inflammatory bowel disease


Medical patient at bed rest Age 61-74


Arthroscopic surgery


Major open surgery (> 45 min)


Laparoscopic surgery (> 45 min)


Malignancy


Confined to bed (> 72 hours)


Immobilizing plaster cast


Central venous access Age >= 75


History of VTE


Family history of VTE


Factor V Leiden


Prothrombin 37285D


Lupus anticoagulant


Anticardiolipin antibodies


Elevated serum homocysteine


Heparin-induced thrombocytopenia


Other congenital or acquired


   thrombophilia Stroke (< 1 month)


Elective arthroplasty


Hip, pelvis, or leg fracture


Acute spinal cord injury (< 1 month)








Prophylaxis Regimen











   Total Risk


Factor Score Risk Level Prophylaxis Regimen


 


0-1      Low Early ambulation


 


2 Moderate Order ONE of the following:


*Sequential Compression Device (SCD)


*Heparin 5000 units SQ BID


 


3-4 Higher Order ONE of the following medications:


*Heparin 5000 units SQ TID


*Enoxaparin/Lovenox 40 mg SQ daily (WT < 150 kg, CrCl > 30 mL/min)


*Enoxaparin/Lovenox 30 mg SQ daily (WT < 150 kg, CrCl > 10-29 mL/min)


*Enoxaparin/Lovenox 30 mg SQ BID (WT < 150 kg, CrCl > 30 mL/min)


AND/OR


*Sequential Compression Device (SCD)


 


5 or more Highest Order ONE of the following medications:


*Heparin 5000 units SQ TID (Preferred with Epidurals)


*Enoxaparin/Lovenox 40 mg SQ daily (WT < 150 kg, CrCl > 30 mL/min)


*Enoxaparin/Lovenox 30 mg SQ daily (WT < 150 kg, CrCl > 10-29 mL/min)


*Enoxaparin/Lovenox 30 mg SQ BID (WT < 150 kg, CrCl > 30 mL/min)


AND


*Sequential Compression Device (SCD)








 (Tracy Gilliam MD R1)





Assessment and Plan


Assessment and Plan


Patient is a 78 year old male with history of MI s/p CABG and stent placement 

as well as CHF who present to the [facility] ED via EMS for evaluation of chest 

pain and generalized weakness.


Code Status


DNR


Discussed Condition With


Dr. Pack


 (Tracy Gilliam MD R1)


Attending Attestation


THIS CASE WAS DISCUSSED WITH THE RESIDENT PHYSICIAN. I HAVE REVIEWED THE RECORD 

AND AGREE WITH THE ABOVE NOTE AND PLAN OF CARE AS WAS DISCUSSED. I HAVE 

AUTHORIZED THE ORDER FOR PLACEMENT IN OUT-PATIENT OBSERVATION STATUS. 


 (Bill Lennon MD)


Problem List:  


(1) Generalized weakness


ICD Codes:  R53.1 - Weakness


Status:  Acute


Plan:  The patient reports a generalized weakness, which he has been 

experiencing on and off for the past year. The weakness has worsened over the 

past few weeks. The patient states that when he stands up from a seated position

, he feels like his legs are going to give out, he has to brace himself and 

hold on to furniture to move around the house. Outside the house, he uses a 

walker. Patient reports several falls yesterday and one this evening. He did 

not hit his head or loose consciousness. 





Differential Diagnosis: 


* Secondary to infectious process, likely viral upper respiratory versus anemia 

versus deconditioning versus orthostatic hypotension versus CHF.





Labs/Microbiology: 


* WBC 6.6, RBC 4.24, Hemoglobin 11.4, Hct 34.8, MCV 82


* 


* Urine yellow in color, specific gravity 1.019, otherwise negative, no culture 

indicated


* Influenza A/B antigen negative 





Studies/Imaging: 


* Chest X-ray: no acute findings. No focal infiltrates seen. 





Orders: 


* Vitals q4hr. 


* Orthostatic blood pressure. 


* Continuous telemetry. 


* Activity OOB with Assistance. 


* Repeat CBC in a.m.





(2) Shortness of breath


ICD Codes:  R06.02 - Shortness of breath


Status:  Acute


Plan:  The patient reports worsening shortness of breath with exertion over the 

past few weeks; patient even experiences temporary shortness of breath when 

sitting up from supine position. He has been using his nebulizer x4/day. He has 

had a dry, productive cough since November. He denies fever but reports chills. 

He denies nasal congestion. He reports minimal throat pain and hoarseness. 





Differential Diagnosis: 


* Secondary to infectious process, likely viral upper respiratory versus COPD 

exacerbation versus CHF versus MI versus anemia versus deconditioning. 





 Labs/Microbiology: 


* WBC 6.6, RBC 4.24, Hemoglobin 11.4, Hct 34.8, MCV 82


* Urine yellow in color, specific gravity 1.019, otherwise negative, no culture 

indicated


* Influenza A/B antigen negative 


* Carbon dioxide 24.2, anion gap 8. 


* . 


* Troponin 0.04 with repeat 0.04. 





Studies/Imaging: 


* Chest X-ray: no acute findings. No focal infiltrates seen. 


* EKG: Inferior myocardial infarction with non-specific ST/T wave changes; 

compared to previous tracing (17), ST/T wave changes more prominent 

today. 





Orders: 


* Vitals q4hr. 


* Continuous telemetry. 


* Troponin x3 q6hr. 


* EKG x3 q6hr. 


* Repeat CBC and BMP in a.m. 





Medications: 


* Continue home Duoneb 1 ampule q6hr NEB.


* Albuterol 2.5mg q2hr NEB PRN Shortness of Breath. 


 


* Continue home Furosemide 20mg daily PO. 


* Continue home Entresto 1 tab BID PO.  





(3) Symptoms of upper respiratory infection (URI)


ICD Codes:  R09.89 - Other specified symptoms and signs involving the 

circulatory and respiratory systems


Status:  Acute


Plan:  


* See Plan Shortness of Breath. 





(4) Chest pain


ICD Codes:  R07.9 - Chest pain, unspecified


Status:  Chronic


Plan:  Patient with extensive cardiac history. He reports a left-sided chest 

pain that he has been experiencing on and off for the past 15 years. He first 

experiences the sharp, non-radiating, 5/10 pain a few months after open heart 

surgery (approximately 15 years ago). He states that the pain comes and goes 

but is present throughout the day, most days. The pain is different than the 

pain the patient felt when suffering MI.  He denies any associated symptoms 

such as diaphoresis, shortness of breath, nausea and vomiting. The patient says 

that nothing makes the pain better or worse. Nitroglycerin, given by EMS, did 

not alleviate symptoms. 





Differential Diagnosis: 


* Unlikely acute process due to duration of symptom. Regardless, must rule out 

MI/ACS. 





 Labs/Microbiology: 


* Troponin 0.04 with repeat 0.04. 





Studies/Imaging: 


* Chest X-ray: no acute findings. No focal infiltrates seen. 


* EKG: Inferior myocardial infarction with non-specific ST/T wave changes; 

compared to previous tracing (17), ST/T wave changes more prominent 

today. 





Orders: 


* Vitals q4hr. 


* Continuous telemetry. 


* Troponin x3 q6hr. 


* EKG x3 q6hr. 





Medications: 


* Continue home Metoprolol 25mg daily PO. 


* Continue home Furosemide 20mg daily PO. 


* Continue home Entresto 1 tab BID PO. 


* Continue home Aspirin 325mg daily PO. 


* Continue home Pravastatin 40mg daily PO.  





(5) Anemia


ICD Codes:  D64.9 - Anemia, unspecified


Status:  Chronic


Plan:  On admission, Hgb 11.4 and Hct 34.8. Hospitalized in November for Hgb of 

5.5; received 3 units of blood during visit. Reason for anemia was never 

identified. Endoscopy/colonoscopy with no evidence of acute bleed. Weight 

unchanged.  





Differential Diagnosis: 


* Unknown; Hgb improved from November.  





 Labs/Microbiology: 


* Hgb 11.4 as compared to Hgb of 9.7 at time of discharge in November. 





Orders: 


* Vitals q4hr. 


* Repeat CBC in a.m. 





(6) Elevated serum creatinine


ICD Codes:  R79.89 - Other specified abnormal findings of blood chemistry


Status:  Acute


Plan:  On admission, Cr 1.54 and BUN 26. During last admission, Cr ranged from 

1.22-1.44 and BUN ranged from 11-22. 





Differential Diagnosis: 


* Dehydration. 





 Labs/Microbiology: 


* Urine yellow in color, specific gravity 1.019, otherwise negative, no culture 

indicated





Orders: 


* Repeat BMP in a.m. 





Medications: 


* Encourage PO hydration. May consider adding IV fluids if Cr/BUN rise. Avoid 

fluid overload with history of CHF. 





(7) CHF (congestive heart failure)


ICD Codes:  I50.9 - Heart failure, unspecified


Status:  Chronic


Plan:  ECHO in 2017 showed ejection fraction in the range of 20-25%. 

No evidence of fluid overload this admission. 





Differential Diagnosis: 


* CHF - compensating, stable. 





 Labs/Microbiology: 


* . 





Studies/Imaging: 


* Chest X-ray: no acute findings. No focal infiltrates seen. 


* EKG: Inferior myocardial infarction with non-specific ST/T wave changes; 

compared to previous tracing (17), ST/T wave changes more prominent 

today. 





Orders: 


* Vitals q4hr. 


* Continuous telemetry. 


* Troponin x3 q6hr. 


* EKG x3 q6hr. 





Medications:  


* Continue home Furosemide 20mg daily PO. 


* Continue home Entresto 1 tab BID PO.  





(8) Depression


ICD Codes:  F32.9 - Major depressive disorder, single episode, unspecified


Status:  Chronic


Plan:  Patient with history of PTSD. Admitted to recurrent suicidal ideations 

throughout life. Patient denies current plan but states that he is tired and 

ready "to go."





* Monitor closely. 





(9) Fluid, Electrolyte, Nutrition, and Prophylaxis  


Status:  Acute


Plan:  Fluid:


* Patient tolerating PO. 


* Encourage PO hydration. 


Electrolyte:


* Monitor and replete as necessary. 


Nutrition: 


* Heart Healthy Diet. 


DVT Prophylaxis: 


* Heparin 5,000 units q8hr SQ. 


* SCDs ordered. 


GI Prophylaxis: 


* Continue home Protonix 40mg daily PO. 


 (Tracy Gilliam MD R1)


Problem List:  


(1) Generalized weakness


ICD Codes:  R53.1 - Weakness


Status:  Acute


Plan:  The patient reports a generalized weakness, which he has been 

experiencing on and off for the past year. The weakness has worsened over the 

past few weeks. The patient states that when he stands up from a seated position

, he feels like his legs are going to give out, he has to brace himself and 

hold on to furniture to move around the house. Outside the house, he uses a 

walker. Patient reports several falls yesterday and one this evening. He did 

not hit his head or loose consciousness. 





Physical therapy evaluated patient and recommended home PT





No evidence of infection or electrolyte abnormality that could be causing 

symptoms


Labs/Microbiology: 


* WBC 6.6, RBC 4.24, Hemoglobin 11.4, Hct 34.8, MCV 82


* 


* Urine yellow in color, specific gravity 1.019, otherwise negative, no culture 

indicated


* Influenza A/B antigen negative 


Studies/Imaging: 


* Chest X-ray: no acute findings. No focal infiltrates seen. 





(2) Shortness of breath


ICD Codes:  R06.02 - Shortness of breath


Status:  Acute


Plan:  Patient with a history of sleep COPD and worsening shortness of breath





ACS ruled out with troponins negative 3


Chest x-ray unremarkable


EKG with no acute changes from previous reads





Likely secondary to COPD


Started on Symbicort twice daily


Continue home duo nebs and albuterol


Continue home furosemide and interested for CHF





(3) Chest pain


ICD Codes:  R07.9 - Chest pain, unspecified


Status:  Chronic


Plan:  Chest pain without significant exacerbation


-Cardiac troponins stable at 0.043


-EKG without any significant changes


Chest x-ray within normal limits





Plan to discharge home today





Medications: 


* Continue home Metoprolol 25mg daily PO. 


* Continue home Furosemide 20mg daily PO. 


* Continue home Entresto 1 tab BID PO. 


* Continue home Aspirin 325mg daily PO. 


* Continue home Pravastatin 40mg daily PO.  





(4) Elevated serum creatinine


ICD Codes:  R79.89 - Other specified abnormal findings of blood chemistry


Status:  Acute


Plan:  On admission, Cr 1.54 and BUN 26. During last admission, Cr ranged from 

1.22-1.44 and BUN ranged from 11-22. 





Differential Diagnosis: 


* Dehydration. 





 Labs/Microbiology: 


* Urine yellow in color, specific gravity 1.019, otherwise negative, no culture 

indicated





Orders: 


* Repeat BMP in a.m. 





Medications: 


* Encourage PO hydration. May consider adding IV fluids if Cr/BUN rise. Avoid 

fluid overload with history of CHF. 





(5) CHF (congestive heart failure)


ICD Codes:  I50.9 - Heart failure, unspecified


Status:  Chronic


Plan:  ECHO in 2017 showed ejection fraction in the range of 20-25%. 

No evidence of fluid overload this admission. 





Differential Diagnosis: 


* CHF - compensating, stable. 





 Labs/Microbiology: 


* . 





Studies/Imaging: 


* Chest X-ray: no acute findings. No focal infiltrates seen. 


* EKG: Inferior myocardial infarction with non-specific ST/T wave changes; 

compared to previous tracing (17), ST/T wave changes more prominent 

today. 





Orders: 


* Vitals q4hr. 


* Continuous telemetry. 


* Troponin x3 q6hr. 


* EKG x3 q6hr. 





Medications:  


* Continue home Furosemide 20mg daily PO. 


* Continue home Entresto 1 tab BID PO.  





(6) Depression


ICD Codes:  F32.9 - Major depressive disorder, single episode, unspecified


Status:  Chronic


Plan:  No acute issues





(7) Fluid, Electrolyte, Nutrition, and Prophylaxis  


Status:  Acute


Plan:  Plan to discharge home today with home PT


 (Bill Lennon MD)





Problem Qualifiers





(1) Anemia:  


Qualified Codes:  D64.9 - Anemia, unspecified








Tracy Gilliam MD R1 Feb 10, 2018 21:28


Bill Lennon MD 2018 12:33

## 2018-02-11 VITALS
TEMPERATURE: 97.8 F | RESPIRATION RATE: 18 BRPM | DIASTOLIC BLOOD PRESSURE: 64 MMHG | SYSTOLIC BLOOD PRESSURE: 110 MMHG | OXYGEN SATURATION: 95 % | HEART RATE: 63 BPM

## 2018-02-11 VITALS
DIASTOLIC BLOOD PRESSURE: 66 MMHG | SYSTOLIC BLOOD PRESSURE: 129 MMHG | HEART RATE: 64 BPM | OXYGEN SATURATION: 98 % | RESPIRATION RATE: 18 BRPM

## 2018-02-11 LAB
BUN SERPL-MCNC: 22 MG/DL (ref 7–18)
CALCIUM SERPL-MCNC: 7.9 MG/DL (ref 8.5–10.1)
CHLORIDE SERPL-SCNC: 109 MEQ/L (ref 98–107)
COLOR UR: YELLOW
CREAT SERPL-MCNC: 1.19 MG/DL (ref 0.6–1.3)
ERYTHROCYTE [DISTWIDTH] IN BLOOD BY AUTOMATED COUNT: 16.4 % (ref 11.6–17.2)
GFR SERPLBLD BASED ON 1.73 SQ M-ARVRAT: 59 ML/MIN (ref 89–?)
GLUCOSE SERPL-MCNC: 93 MG/DL (ref 74–106)
GLUCOSE UR STRIP-MCNC: (no result) MG/DL
HCO3 BLD-SCNC: 22.8 MEQ/L (ref 21–32)
HCT VFR BLD CALC: 35 % (ref 39–51)
HGB BLD-MCNC: 11.5 GM/DL (ref 13–17)
HGB UR QL STRIP: (no result)
KETONES UR STRIP-MCNC: (no result) MG/DL
MCH RBC QN AUTO: 26.9 PG (ref 27–34)
MCHC RBC AUTO-ENTMCNC: 33 % (ref 32–36)
MCV RBC AUTO: 81.6 FL (ref 80–100)
NITRITE UR QL STRIP: (no result)
PLATELET # BLD: 256 TH/MM3 (ref 150–450)
PMV BLD AUTO: 6.8 FL (ref 7–11)
RBC # BLD AUTO: 4.29 MIL/MM3 (ref 4.5–5.9)
SODIUM SERPL-SCNC: 141 MEQ/L (ref 136–145)
SP GR UR STRIP: 1.02 (ref 1–1.03)
URINE LEUKOCYTE ESTERASE: (no result)
WBC # BLD AUTO: 6.1 TH/MM3 (ref 4–11)

## 2018-02-11 RX ADMIN — IPRATROPIUM BROMIDE AND ALBUTEROL SULFATE SCH AMPULE: .5; 3 SOLUTION RESPIRATORY (INHALATION) at 03:39

## 2018-02-11 RX ADMIN — IPRATROPIUM BROMIDE AND ALBUTEROL SULFATE SCH AMPULE: .5; 3 SOLUTION RESPIRATORY (INHALATION) at 10:09

## 2018-02-11 RX ADMIN — HEPARIN SODIUM SCH UNITS: 10000 INJECTION, SOLUTION INTRAVENOUS; SUBCUTANEOUS at 07:53

## 2018-02-11 RX ADMIN — HEPARIN SODIUM SCH UNITS: 10000 INJECTION, SOLUTION INTRAVENOUS; SUBCUTANEOUS at 00:40

## 2018-02-11 NOTE — EKG
Date Performed: 02/10/2018       Time Performed: 18:11:48

 

PTAGE:      78 years

 

EKG:      Sinus rhythm 

 

 INFERIOR MYOCARDIAL INFARCTION NON-SPECIFIC ST/T WAVE CHANGES ABNORMAL ECG

 

PREVIOUS TRACING       : 11/16/2017 20.44 Compared to prior tracing, previous ST/T wave changes are l
ess prominent

 

DOCTOR:   Konstantin Aviles  Interpretating Date/Time  02/11/2018 00:39:45

## 2018-02-11 NOTE — HHI.FF
Face to Face Verification


Diagnosis:  


(1) Generalized weakness


(2) CHF (congestive heart failure)


Physical Therapy


Order:  Evaluate and Treat, Improve ambulation, Strength and gait training





Home Health Nursing








Order: Medical education





 Signs/symptoms of disease process





 CHF education





 Medication education-adverse effect

















I have seen patient Kavon Doyle on 2/11/18. My clinical findings support the 

need for the requested home health care services because:








 Ltd mobility - disease progression





 Patient has SOB





 Deconditioned w/ increased weakness





 Need for psychosocial assistance





 High risk of falls














I certify that my clinical findings support that this patient is homebound 

because:








 Hx COPD- exertion dyspnea/weakness





 Unsteady gait/balance





 Need for psychosocial assistance

















Vero Escalante MD, R3 Feb 11, 2018 09:40

## 2018-03-02 ENCOUNTER — HOSPITAL ENCOUNTER (INPATIENT)
Dept: HOSPITAL 17 - NEPE | Age: 78
LOS: 3 days | Discharge: PSYCHIATRIC OR SUBSTANCE USE FACILITY | DRG: 247 | End: 2018-03-05
Attending: FAMILY MEDICINE | Admitting: FAMILY MEDICINE
Payer: OTHER GOVERNMENT

## 2018-03-02 VITALS
RESPIRATION RATE: 20 BRPM | HEART RATE: 73 BPM | TEMPERATURE: 98.1 F | SYSTOLIC BLOOD PRESSURE: 116 MMHG | OXYGEN SATURATION: 93 % | DIASTOLIC BLOOD PRESSURE: 59 MMHG

## 2018-03-02 VITALS
SYSTOLIC BLOOD PRESSURE: 128 MMHG | RESPIRATION RATE: 20 BRPM | DIASTOLIC BLOOD PRESSURE: 66 MMHG | HEART RATE: 60 BPM | OXYGEN SATURATION: 98 %

## 2018-03-02 VITALS
SYSTOLIC BLOOD PRESSURE: 145 MMHG | HEART RATE: 65 BPM | DIASTOLIC BLOOD PRESSURE: 85 MMHG | OXYGEN SATURATION: 100 % | RESPIRATION RATE: 20 BRPM

## 2018-03-02 VITALS — HEIGHT: 67 IN | WEIGHT: 216.05 LBS | BODY MASS INDEX: 33.91 KG/M2

## 2018-03-02 VITALS — OXYGEN SATURATION: 98 % | RESPIRATION RATE: 20 BRPM | HEART RATE: 74 BPM

## 2018-03-02 VITALS
DIASTOLIC BLOOD PRESSURE: 61 MMHG | RESPIRATION RATE: 20 BRPM | OXYGEN SATURATION: 98 % | HEART RATE: 69 BPM | SYSTOLIC BLOOD PRESSURE: 102 MMHG

## 2018-03-02 VITALS — DIASTOLIC BLOOD PRESSURE: 59 MMHG | SYSTOLIC BLOOD PRESSURE: 102 MMHG

## 2018-03-02 DIAGNOSIS — D64.9: ICD-10-CM

## 2018-03-02 DIAGNOSIS — I10: ICD-10-CM

## 2018-03-02 DIAGNOSIS — I25.110: ICD-10-CM

## 2018-03-02 DIAGNOSIS — E78.5: ICD-10-CM

## 2018-03-02 DIAGNOSIS — R45.851: ICD-10-CM

## 2018-03-02 DIAGNOSIS — F10.21: ICD-10-CM

## 2018-03-02 DIAGNOSIS — N17.9: ICD-10-CM

## 2018-03-02 DIAGNOSIS — I21.4: Primary | ICD-10-CM

## 2018-03-02 DIAGNOSIS — Z95.1: ICD-10-CM

## 2018-03-02 DIAGNOSIS — J96.10: ICD-10-CM

## 2018-03-02 DIAGNOSIS — Z95.5: ICD-10-CM

## 2018-03-02 DIAGNOSIS — J44.9: ICD-10-CM

## 2018-03-02 DIAGNOSIS — I25.810: ICD-10-CM

## 2018-03-02 DIAGNOSIS — Z79.82: ICD-10-CM

## 2018-03-02 DIAGNOSIS — Z87.891: ICD-10-CM

## 2018-03-02 DIAGNOSIS — Z87.11: ICD-10-CM

## 2018-03-02 DIAGNOSIS — F43.21: ICD-10-CM

## 2018-03-02 DIAGNOSIS — M15.9: ICD-10-CM

## 2018-03-02 DIAGNOSIS — I25.5: ICD-10-CM

## 2018-03-02 DIAGNOSIS — K44.9: ICD-10-CM

## 2018-03-02 DIAGNOSIS — E66.01: ICD-10-CM

## 2018-03-02 LAB
ALBUMIN SERPL-MCNC: 3.7 GM/DL (ref 3.4–5)
ALP SERPL-CCNC: 73 U/L (ref 45–117)
ALT SERPL-CCNC: 26 U/L (ref 12–78)
AST SERPL-CCNC: 23 U/L (ref 15–37)
BASOPHILS # BLD AUTO: 0 TH/MM3 (ref 0–0.2)
BASOPHILS NFR BLD: 0.5 % (ref 0–2)
BILIRUB SERPL-MCNC: 0.2 MG/DL (ref 0.2–1)
BUN SERPL-MCNC: 26 MG/DL (ref 7–18)
CALCIUM SERPL-MCNC: 9.1 MG/DL (ref 8.5–10.1)
CHLORIDE SERPL-SCNC: 104 MEQ/L (ref 98–107)
CREAT SERPL-MCNC: 1.62 MG/DL (ref 0.6–1.3)
EOSINOPHIL # BLD: 0.1 TH/MM3 (ref 0–0.4)
EOSINOPHIL NFR BLD: 0.6 % (ref 0–4)
ERYTHROCYTE [DISTWIDTH] IN BLOOD BY AUTOMATED COUNT: 16.4 % (ref 11.6–17.2)
GFR SERPLBLD BASED ON 1.73 SQ M-ARVRAT: 42 ML/MIN (ref 89–?)
GLUCOSE SERPL-MCNC: 97 MG/DL (ref 74–106)
HCO3 BLD-SCNC: 26 MEQ/L (ref 21–32)
HCT VFR BLD CALC: 35.8 % (ref 39–51)
HGB BLD-MCNC: 11.8 GM/DL (ref 13–17)
INR PPP: 1 RATIO
LYMPHOCYTES # BLD AUTO: 1.3 TH/MM3 (ref 1–4.8)
LYMPHOCYTES NFR BLD AUTO: 14.8 % (ref 9–44)
MAGNESIUM SERPL-MCNC: 2.2 MG/DL (ref 1.5–2.5)
MCH RBC QN AUTO: 26.4 PG (ref 27–34)
MCHC RBC AUTO-ENTMCNC: 33.1 % (ref 32–36)
MCV RBC AUTO: 79.8 FL (ref 80–100)
MONOCYTE #: 0.8 TH/MM3 (ref 0–0.9)
MONOCYTES NFR BLD: 9.1 % (ref 0–8)
NEUTROPHILS # BLD AUTO: 6.4 TH/MM3 (ref 1.8–7.7)
NEUTROPHILS NFR BLD AUTO: 75 % (ref 16–70)
PLATELET # BLD: 300 TH/MM3 (ref 150–450)
PMV BLD AUTO: 6.6 FL (ref 7–11)
PROT SERPL-MCNC: 7.3 GM/DL (ref 6.4–8.2)
PROTHROMBIN TIME: 10.5 SEC (ref 9.8–11.6)
RBC # BLD AUTO: 4.49 MIL/MM3 (ref 4.5–5.9)
SODIUM SERPL-SCNC: 139 MEQ/L (ref 136–145)
TROPONIN I SERPL-MCNC: 1.26 NG/ML (ref 0.02–0.05)
WBC # BLD AUTO: 8.5 TH/MM3 (ref 4–11)

## 2018-03-02 PROCEDURE — 84484 ASSAY OF TROPONIN QUANT: CPT

## 2018-03-02 PROCEDURE — 99153 MOD SED SAME PHYS/QHP EA: CPT

## 2018-03-02 PROCEDURE — 83735 ASSAY OF MAGNESIUM: CPT

## 2018-03-02 PROCEDURE — 94664 DEMO&/EVAL PT USE INHALER: CPT

## 2018-03-02 PROCEDURE — 83540 ASSAY OF IRON: CPT

## 2018-03-02 PROCEDURE — 80053 COMPREHEN METABOLIC PANEL: CPT

## 2018-03-02 PROCEDURE — 94640 AIRWAY INHALATION TREATMENT: CPT

## 2018-03-02 PROCEDURE — C1893 INTRO/SHEATH, FIXED,NON-PEEL: HCPCS

## 2018-03-02 PROCEDURE — 80048 BASIC METABOLIC PNL TOTAL CA: CPT

## 2018-03-02 PROCEDURE — 83550 IRON BINDING TEST: CPT

## 2018-03-02 PROCEDURE — 93458 L HRT ARTERY/VENTRICLE ANGIO: CPT

## 2018-03-02 PROCEDURE — C1874 STENT, COATED/COV W/DEL SYS: HCPCS

## 2018-03-02 PROCEDURE — 99285 EMERGENCY DEPT VISIT HI MDM: CPT

## 2018-03-02 PROCEDURE — 82728 ASSAY OF FERRITIN: CPT

## 2018-03-02 PROCEDURE — 71046 X-RAY EXAM CHEST 2 VIEWS: CPT

## 2018-03-02 PROCEDURE — C1725 CATH, TRANSLUMIN NON-LASER: HCPCS

## 2018-03-02 PROCEDURE — 92928 PRQ TCAT PLMT NTRAC ST 1 LES: CPT

## 2018-03-02 PROCEDURE — 93005 ELECTROCARDIOGRAM TRACING: CPT

## 2018-03-02 PROCEDURE — C1769 GUIDE WIRE: HCPCS

## 2018-03-02 PROCEDURE — 99152 MOD SED SAME PHYS/QHP 5/>YRS: CPT

## 2018-03-02 PROCEDURE — 82550 ASSAY OF CK (CPK): CPT

## 2018-03-02 PROCEDURE — 87641 MR-STAPH DNA AMP PROBE: CPT

## 2018-03-02 PROCEDURE — 85027 COMPLETE CBC AUTOMATED: CPT

## 2018-03-02 PROCEDURE — G0269 OCCLUSIVE DEVICE IN VEIN ART: HCPCS

## 2018-03-02 PROCEDURE — 85025 COMPLETE CBC W/AUTO DIFF WBC: CPT

## 2018-03-02 PROCEDURE — 83880 ASSAY OF NATRIURETIC PEPTIDE: CPT

## 2018-03-02 PROCEDURE — 85730 THROMBOPLASTIN TIME PARTIAL: CPT

## 2018-03-02 PROCEDURE — 71045 X-RAY EXAM CHEST 1 VIEW: CPT

## 2018-03-02 PROCEDURE — 93306 TTE W/DOPPLER COMPLETE: CPT

## 2018-03-02 PROCEDURE — 82552 ASSAY OF CPK IN BLOOD: CPT

## 2018-03-02 PROCEDURE — C1760 CLOSURE DEV, VASC: HCPCS

## 2018-03-02 PROCEDURE — 85610 PROTHROMBIN TIME: CPT

## 2018-03-02 RX ADMIN — PHENYTOIN SODIUM SCH MLS/HR: 50 INJECTION INTRAMUSCULAR; INTRAVENOUS at 22:12
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
## 2018-03-02 NOTE — RADRPT
EXAM DATE/TIME:  03/02/2018 20:07 

 

[facility] COMPARISON:     

CHEST PA & LAT, April 02, 2015, 10:28.

 

                     

INDICATIONS :     

Bilateral chest pain and shortness of breath.

                     

 

MEDICAL HISTORY :            

Chronic obstructive pulmonary disease. Congestive heart failure. Myocardial infarction.   

 

SURGICAL HISTORY :        

CABG. Coronary artery stent.

 

ENCOUNTER:     

Initial                                        

 

ACUITY:     

1 day      

 

PAIN SCORE:     

4/10

 

LOCATION:     

Bilateral chest 

 

FINDINGS:     

A moderate-sized hiatal hernia is noted. The heart is stable. Median sternotomy wires are noted statu
s post cardiac surgery. The pulmonary vascular pattern is normal. The lungs are clear. Degenerative c
hanges are noted throughout the thoracic spine.

 

CONCLUSION:     

1. Moderate-sized hiatal hernia. 

2. No acute cardiopulmonary disease. 

 

 

 Anjel Murphy MD on March 02, 2018 at 20:34           

Board Certified Radiologist.

 This report was verified electronically.
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
## 2018-03-03 VITALS
RESPIRATION RATE: 18 BRPM | HEART RATE: 60 BPM | SYSTOLIC BLOOD PRESSURE: 112 MMHG | OXYGEN SATURATION: 98 % | DIASTOLIC BLOOD PRESSURE: 66 MMHG

## 2018-03-03 VITALS
OXYGEN SATURATION: 98 % | RESPIRATION RATE: 18 BRPM | HEART RATE: 63 BPM | DIASTOLIC BLOOD PRESSURE: 71 MMHG | TEMPERATURE: 98 F | SYSTOLIC BLOOD PRESSURE: 158 MMHG

## 2018-03-03 VITALS
RESPIRATION RATE: 18 BRPM | DIASTOLIC BLOOD PRESSURE: 65 MMHG | OXYGEN SATURATION: 99 % | SYSTOLIC BLOOD PRESSURE: 120 MMHG | HEART RATE: 56 BPM

## 2018-03-03 VITALS
RESPIRATION RATE: 15 BRPM | OXYGEN SATURATION: 97 % | DIASTOLIC BLOOD PRESSURE: 73 MMHG | TEMPERATURE: 97.5 F | HEART RATE: 60 BPM | SYSTOLIC BLOOD PRESSURE: 152 MMHG

## 2018-03-03 VITALS
SYSTOLIC BLOOD PRESSURE: 95 MMHG | RESPIRATION RATE: 20 BRPM | TEMPERATURE: 98.2 F | HEART RATE: 68 BPM | OXYGEN SATURATION: 97 % | DIASTOLIC BLOOD PRESSURE: 77 MMHG

## 2018-03-03 VITALS
RESPIRATION RATE: 18 BRPM | OXYGEN SATURATION: 98 % | HEART RATE: 57 BPM | SYSTOLIC BLOOD PRESSURE: 103 MMHG | DIASTOLIC BLOOD PRESSURE: 72 MMHG

## 2018-03-03 VITALS
HEART RATE: 64 BPM | RESPIRATION RATE: 20 BRPM | DIASTOLIC BLOOD PRESSURE: 76 MMHG | OXYGEN SATURATION: 99 % | SYSTOLIC BLOOD PRESSURE: 141 MMHG

## 2018-03-03 VITALS — HEART RATE: 57 BPM

## 2018-03-03 VITALS
HEART RATE: 66 BPM | OXYGEN SATURATION: 98 % | SYSTOLIC BLOOD PRESSURE: 103 MMHG | DIASTOLIC BLOOD PRESSURE: 50 MMHG | RESPIRATION RATE: 16 BRPM

## 2018-03-03 VITALS
SYSTOLIC BLOOD PRESSURE: 163 MMHG | HEART RATE: 63 BPM | DIASTOLIC BLOOD PRESSURE: 86 MMHG | OXYGEN SATURATION: 100 % | TEMPERATURE: 98.2 F | RESPIRATION RATE: 22 BRPM

## 2018-03-03 VITALS — HEART RATE: 64 BPM

## 2018-03-03 VITALS — OXYGEN SATURATION: 97 %

## 2018-03-03 VITALS — OXYGEN SATURATION: 98 %

## 2018-03-03 VITALS — HEART RATE: 53 BPM

## 2018-03-03 VITALS — HEART RATE: 56 BPM

## 2018-03-03 VITALS — OXYGEN SATURATION: 99 %

## 2018-03-03 VITALS — HEART RATE: 60 BPM

## 2018-03-03 VITALS — HEART RATE: 71 BPM

## 2018-03-03 LAB
% SATURATION IRON PROFILE: 6.9 % (ref 20–50)
ALBUMIN SERPL-MCNC: 3.4 GM/DL (ref 3.4–5)
ALP SERPL-CCNC: 66 U/L (ref 45–117)
ALT SERPL-CCNC: 24 U/L (ref 12–78)
AST SERPL-CCNC: 24 U/L (ref 15–37)
BASOPHILS # BLD AUTO: 0.1 TH/MM3 (ref 0–0.2)
BASOPHILS NFR BLD: 1 % (ref 0–2)
BILIRUB SERPL-MCNC: 0.2 MG/DL (ref 0.2–1)
BUN SERPL-MCNC: 24 MG/DL (ref 7–18)
CALCIUM SERPL-MCNC: 8.5 MG/DL (ref 8.5–10.1)
CHLORIDE SERPL-SCNC: 106 MEQ/L (ref 98–107)
CREAT SERPL-MCNC: 1.35 MG/DL (ref 0.6–1.3)
EOSINOPHIL # BLD: 0.1 TH/MM3 (ref 0–0.4)
EOSINOPHIL NFR BLD: 2.2 % (ref 0–4)
ERYTHROCYTE [DISTWIDTH] IN BLOOD BY AUTOMATED COUNT: 16.4 % (ref 11.6–17.2)
FERRITIN SERPL-MCNC: 8 NG/ML (ref 26–388)
GFR SERPLBLD BASED ON 1.73 SQ M-ARVRAT: 51 ML/MIN (ref 89–?)
GLUCOSE SERPL-MCNC: 102 MG/DL (ref 74–106)
HCO3 BLD-SCNC: 25.6 MEQ/L (ref 21–32)
HCT VFR BLD CALC: 37 % (ref 39–51)
HGB BLD-MCNC: 12.2 GM/DL (ref 13–17)
IRON (FE): 31 MCG/DL (ref 65–175)
LYMPHOCYTES # BLD AUTO: 1.3 TH/MM3 (ref 1–4.8)
LYMPHOCYTES NFR BLD AUTO: 19.9 % (ref 9–44)
MCH RBC QN AUTO: 26.7 PG (ref 27–34)
MCHC RBC AUTO-ENTMCNC: 32.9 % (ref 32–36)
MCV RBC AUTO: 81.2 FL (ref 80–100)
MONOCYTE #: 0.7 TH/MM3 (ref 0–0.9)
MONOCYTES NFR BLD: 10.5 % (ref 0–8)
NEUTROPHILS # BLD AUTO: 4.4 TH/MM3 (ref 1.8–7.7)
NEUTROPHILS NFR BLD AUTO: 66.4 % (ref 16–70)
PLATELET # BLD: 261 TH/MM3 (ref 150–450)
PMV BLD AUTO: 7.1 FL (ref 7–11)
PROT SERPL-MCNC: 7 GM/DL (ref 6.4–8.2)
RBC # BLD AUTO: 4.56 MIL/MM3 (ref 4.5–5.9)
SODIUM SERPL-SCNC: 141 MEQ/L (ref 136–145)
TIBC SERPL-MCNC: 448 MCG/DL (ref 250–450)
TROPONIN I SERPL-MCNC: 1.07 NG/ML (ref 0.02–0.05)
WBC # BLD AUTO: 6.6 TH/MM3 (ref 4–11)

## 2018-03-03 PROCEDURE — 4A023N7 MEASUREMENT OF CARDIAC SAMPLING AND PRESSURE, LEFT HEART, PERCUTANEOUS APPROACH: ICD-10-PCS | Performed by: INTERNAL MEDICINE

## 2018-03-03 PROCEDURE — 027034Z DILATION OF CORONARY ARTERY, ONE ARTERY WITH DRUG-ELUTING INTRALUMINAL DEVICE, PERCUTANEOUS APPROACH: ICD-10-PCS | Performed by: INTERNAL MEDICINE

## 2018-03-03 PROCEDURE — B2111ZZ FLUOROSCOPY OF MULTIPLE CORONARY ARTERIES USING LOW OSMOLAR CONTRAST: ICD-10-PCS | Performed by: INTERNAL MEDICINE

## 2018-03-03 RX ADMIN — CARVEDILOL SCH MG: 3.12 TABLET, FILM COATED ORAL at 21:00

## 2018-03-03 RX ADMIN — IPRATROPIUM BROMIDE AND ALBUTEROL SULFATE PRN AMPULE: .5; 3 SOLUTION RESPIRATORY (INHALATION) at 03:39

## 2018-03-03 RX ADMIN — NITROGLYCERIN SCH INCH: 20 OINTMENT TOPICAL at 23:29

## 2018-03-03 RX ADMIN — PRAVASTATIN SODIUM SCH MG: 40 TABLET ORAL at 08:42

## 2018-03-03 RX ADMIN — PANTOPRAZOLE SCH MG: 40 TABLET, DELAYED RELEASE ORAL at 08:42

## 2018-03-03 RX ADMIN — STANDARDIZED SENNA CONCENTRATE AND DOCUSATE SODIUM SCH TAB: 8.6; 5 TABLET, FILM COATED ORAL at 21:00

## 2018-03-03 RX ADMIN — STANDARDIZED SENNA CONCENTRATE AND DOCUSATE SODIUM SCH TAB: 8.6; 5 TABLET, FILM COATED ORAL at 08:52

## 2018-03-03 RX ADMIN — BUDESONIDE AND FORMOTEROL FUMARATE DIHYDRATE SCH PUFF: 80; 4.5 AEROSOL RESPIRATORY (INHALATION) at 23:26

## 2018-03-03 RX ADMIN — Medication SCH ML: at 21:00

## 2018-03-03 RX ADMIN — PHENYTOIN SODIUM SCH MLS/HR: 50 INJECTION INTRAMUSCULAR; INTRAVENOUS at 07:13

## 2018-03-03 RX ADMIN — PHENYTOIN SODIUM SCH MLS/HR: 50 INJECTION INTRAMUSCULAR; INTRAVENOUS at 20:00

## 2018-03-03 RX ADMIN — BUDESONIDE AND FORMOTEROL FUMARATE DIHYDRATE SCH PUFF: 80; 4.5 AEROSOL RESPIRATORY (INHALATION) at 08:43

## 2018-03-03 RX ADMIN — PHENYTOIN SODIUM SCH MLS/HR: 50 INJECTION INTRAMUSCULAR; INTRAVENOUS at 23:13

## 2018-03-03 RX ADMIN — Medication SCH ML: at 08:43

## 2018-03-03 RX ADMIN — NITROGLYCERIN SCH INCH: 20 OINTMENT TOPICAL at 14:53

## 2018-03-03 RX ADMIN — IPRATROPIUM BROMIDE AND ALBUTEROL SULFATE PRN AMPULE: .5; 3 SOLUTION RESPIRATORY (INHALATION) at 19:49

## 2018-03-03 RX ADMIN — IPRATROPIUM BROMIDE AND ALBUTEROL SULFATE PRN AMPULE: .5; 3 SOLUTION RESPIRATORY (INHALATION) at 09:24

## 2018-03-03 NOTE — PD.PSY.CON
Provisional Diagnosis


Admission Date


Mar 2, 2018 at 21:15





History of Present Illness


Service


Psychiatry


Consult Requested By


ER team


Reason for Consult


Depression


Primary Care Physician


Alan Mcclure M.D.





Past Family Social History


Coded Allergies:  


     No Known Allergies (Verified  Allergy, Unknown, 11/16/17)


Active Scripts


Budesonide-Formoterol Inh (Symbicort Inh) 80-4.5 Mcg/Act Aero, 1 PUFF INH Q12HR 

for Asthma Management, #1 INHALER 0 Refills


   Prov:Vero Escalante MD, R3         2/11/18


[Albuterol-Ipratropium Neb] 1 AMPULE NEBU No Conflict Check, 1 AMPULE INH Q6HR 

NEB, #120 0 Refills


   Prov:Christine Gamble MD, R1         11/19/17


Furosemide (Furosemide) 20 Mg Tab, 20 MG PO DAILY, #30 TAB


   Prov:Christine Gamble MD, R1         11/19/17


Sacubitril-Valsartan (Entresto) 24-26 Mg Tab, 1 TAB PO BID, #60 TAB


   Prov:Christine Gamble MD, R1         11/19/17


Reported Medications


Pantoprazole (Pantoprazole) 40 Mg Tab, 40 MG PO DAILY for Reflux, TAB 0 Refills


   2/10/18


Metoprolol Tartrate (Metoprolol Tartrate) 25 Mg Tab, 25 MG PO DAILY, TAB 0 

Refills


   2/10/18


Simvastatin (Simvastatin) 20 Mg Tab, 20 MG PO DAILY for Cholesterol Management, 

TAB 0 Refills


   2/10/18


Aspirin (Aspirin) 325 Mg Tab, 325 MG PO DAILY, #30 TAB 0 Refills


   11/16/17


Discontinued Scripts


Nebulizer (Nebulizer) 1 Mis Mis, EA .ROUTE AS DIRECTED for Breathing Treatment, 

#1 0 Refills


   Prov:Christine Gamble MD, R1         11/19/17





Current Medications








 Medications


  (Trade)  Dose


 Ordered  Sig/Armen


 Route  Start Time


 Stop Time Status Last Admin


 


  (Heparin Inj)  5,000 units  UNSCH  PRN


 IV  3/3/18 03:00


     


 


 


  (Heparin Inj)  2,500 units  UNSCH  PRN


 IV  3/3/18 03:00


     


 


 


 Heparin Sodium/


 Dextrose  250 ml @ 


 10.8 mls/hr  TITRATE  PRN


 IV  3/2/18 21:00


    3/2/18 21:20


 


 


 Sodium Chloride  1,000 ml @ 


 100 mls/hr  Q10H


 IV  3/2/18 21:13


    3/2/18 22:12


 


 


  (NS Flush)  2 ml  UNSCH  PRN


 IV FLUSH  3/2/18 21:15


     


 


 


  (NS Flush)  2 ml  BID


 IV FLUSH  3/3/18 09:00


    3/3/18 08:43


 


 


  (Zofran Inj)  4 mg  Q6H  PRN


 IVP  3/2/18 21:15


     


 


 


  (Tylenol)  650 mg  Q6H  PRN


 PO  3/2/18 21:15


     


 


 


  (Norco  5-325 Mg)  1 tab  Q4H  PRN


 PO  3/2/18 21:15


     


 


 


  (Morphine Inj)  2 mg  Q3H  PRN


 IV PUSH  3/2/18 21:15


     


 


 


  (Mariely-Colace)  1 tab  BID


 PO  3/3/18 09:00


     


 


 


  (Milk Of


 Magnesia Liq)  30 ml  Q12H  PRN


 PO  3/2/18 21:15


     


 


 


  (Senokot)  17.2 mg  Q12H  PRN


 PO  3/2/18 21:15


     


 


 


  (Dulcolax Supp)  10 mg  DAILY  PRN


 RECTAL  3/2/18 21:15


     


 


 


  (Lactulose Liq)  30 ml  DAILY  PRN


 PO  3/2/18 21:15


     


 


 


  (Nitroglycerin


 2% Oint)  0.5 inch  Q6HR  PRN


 TOPICAL  3/2/18 21:15


     


 


 


  (Aspirin)  325 mg  DAILY


 PO  3/3/18 09:00


    3/3/18 08:43


 


 


  (Symbicort


 80-4.5 Mcg Inh)  1 puff  Q12HR


 INH  3/3/18 09:00


     


 


 


  (Protonix)  40 mg  DAILY


 PO  3/3/18 09:00


    3/3/18 08:42


 


 


  (Pravachol)  40 mg  DAILY


 PO  3/3/18 09:00


    3/3/18 08:42


 


 


  (Duoneb Neb)  1 ampule  Q4HR NEB  PRN


 NEB  3/2/18 23:00


    3/3/18 09:24


 


 


 Miscellaneous


 Information  Patient in


 critical care


 unit?  Ass...  [id number]


 .XX  3/3/18 06:45


    3/3/18 06:45


 


 


  (Chlorhexidine


 2% Cloth)  3 pack  DAILY@04


 TOPICAL  3/4/18 04:00


 3/8/18 04:01   


 


 


  (Chlorhexidine


 2% Cloth)  3 pack  UNSCH  PRN


 TOPICAL  3/3/18 06:45


 3/8/18 06:42   


 


 


  (Aspirin)  325 mg  ON  CALL


 PO  3/3/18 11:45


 3/7/18 11:44   


 


 


  (Benadryl)  50 mg  ON  CALL


 PO  3/3/18 11:45


 3/7/18 11:44  3/3/18 15:18


 


 


  (Coreg)  3.125 mg  Q12HR


 PO  3/3/18 21:00


     


 


 


  (Nitroglycerin


 2% Oint)  1 inch  Q6HR


 TOPICAL  3/3/18 14:15


    3/3/18 14:53


 











Physical Exam


Vital Signs





Vital Signs








  Date Time  Temp Pulse Resp B/P (MAP) Pulse Ox O2 Delivery O2 Flow Rate FiO2


 


3/3/18 12:00 98.2 63 22 163/86 (111) 100   


 


3/3/18 09:24      Nasal Cannula 1.00 














I/O   


 


 3/3/18 3/3/18 3/4/18





 08:00 16:00 00:00


 


Output Total 250 ml  


 


Balance -250 ml  








Lab Results











Test


  3/2/18


19:48 3/3/18


00:17 3/3/18


03:40 3/3/18


06:05


 


White Blood Count 8.5 TH/MM3    


 


Red Blood Count 4.49 MIL/MM3    


 


Hemoglobin 11.8 GM/DL    


 


Hematocrit 35.8 %    


 


Mean Corpuscular Volume 79.8 FL    


 


Mean Corpuscular Hemoglobin 26.4 PG    


 


Mean Corpuscular Hemoglobin


Concent 33.1 % 


  


  


  


 


 


Red Cell Distribution Width 16.4 %    


 


Platelet Count 300 TH/MM3    


 


Mean Platelet Volume 6.6 FL    


 


Neutrophils (%) (Auto) 75.0 %    


 


Lymphocytes (%) (Auto) 14.8 %    


 


Monocytes (%) (Auto) 9.1 %    


 


Eosinophils (%) (Auto) 0.6 %    


 


Basophils (%) (Auto) 0.5 %    


 


Neutrophils # (Auto) 6.4 TH/MM3    


 


Lymphocytes # (Auto) 1.3 TH/MM3    


 


Monocytes # (Auto) 0.8 TH/MM3    


 


Eosinophils # (Auto) 0.1 TH/MM3    


 


Basophils # (Auto) 0.0 TH/MM3    


 


CBC Comment DIFF FINAL    


 


Differential Comment     


 


Prothrombin Time 10.5 SEC    


 


Prothromb Time International


Ratio 1.0 RATIO 


  


  


  


 


 


Activated Partial


Thromboplast Time 24.5 SEC 


  


  41.8 SEC 


  


 


 


Blood Urea Nitrogen 26 MG/DL    24 MG/DL 


 


Creatinine 1.62 MG/DL    1.35 MG/DL 


 


Random Glucose 97 MG/DL    102 MG/DL 


 


Total Protein 7.3 GM/DL    7.0 GM/DL 


 


Albumin 3.7 GM/DL    3.4 GM/DL 


 


Calcium Level 9.1 MG/DL    8.5 MG/DL 


 


Magnesium Level 2.2 MG/DL    


 


Alkaline Phosphatase 73 U/L    66 U/L 


 


Aspartate Amino Transf


(AST/SGOT) 23 U/L 


  


  


  24 U/L 


 


 


Alanine Aminotransferase


(ALT/SGPT) 26 U/L 


  


  


  24 U/L 


 


 


Total Bilirubin 0.2 MG/DL    0.2 MG/DL 


 


Sodium Level 139 MEQ/L    141 MEQ/L 


 


Potassium Level 3.9 MEQ/L    3.8 MEQ/L 


 


Chloride Level 104 MEQ/L    106 MEQ/L 


 


Carbon Dioxide Level 26.0 MEQ/L    25.6 MEQ/L 


 


Anion Gap 9 MEQ/L    9 MEQ/L 


 


Estimat Glomerular Filtration


Rate 42 ML/MIN 


  


  


  51 ML/MIN 


 


 


Total Creatine Kinase 189 U/L    


 


Creatine Kinase MB 6.6 NG/ML    


 


Troponin I 1.26 NG/ML  1.43 NG/ML   1.07 NG/ML 


 


B-Type Natriuretic Peptide 227 PG/ML    


 


Test


  3/3/18


06:40 3/3/18


12:06 


  


 


 


Nasal Screen MRSA (PCR) MRSA DETECTED    


 


White Blood Count  6.6 TH/MM3   


 


Red Blood Count  4.56 MIL/MM3   


 


Hemoglobin  12.2 GM/DL   


 


Hematocrit  37.0 %   


 


Mean Corpuscular Volume  81.2 FL   


 


Mean Corpuscular Hemoglobin  26.7 PG   


 


Mean Corpuscular Hemoglobin


Concent 


  32.9 % 


  


  


 


 


Red Cell Distribution Width  16.4 %   


 


Platelet Count  261 TH/MM3   


 


Mean Platelet Volume  7.1 FL   


 


Neutrophils (%) (Auto)  66.4 %   


 


Lymphocytes (%) (Auto)  19.9 %   


 


Monocytes (%) (Auto)  10.5 %   


 


Eosinophils (%) (Auto)  2.2 %   


 


Basophils (%) (Auto)  1.0 %   


 


Neutrophils # (Auto)  4.4 TH/MM3   


 


Lymphocytes # (Auto)  1.3 TH/MM3   


 


Monocytes # (Auto)  0.7 TH/MM3   


 


Eosinophils # (Auto)  0.1 TH/MM3   


 


Basophils # (Auto)  0.1 TH/MM3   


 


CBC Comment  DIFF FINAL   


 


Differential Comment     


 


Activated Partial


Thromboplast Time 


  40.7 SEC 


  


  


 


 


Iron Level  31 MCG/DL   


 


Total Iron Binding Capacity  448 MCG/DL   


 


Percent Iron Saturation  6.9 %   


 


Ferritin  8 NG/ML   











Assessment & Plan


Problem List:  


(1) Depression


ICD Codes:  F32.9 - Major depressive disorder, single episode, unspecified


Status:  Chronic


Assessment & Plan:  Patient is intubated at this moment, unable to participate 

in the psychiatric evaluation.  I will revisit the patient once he is able to 

cooperate.





Assessment & Plan


Estimated LOS:  Sulaiman Christiansen MD Mar 3, 2018 15:32
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
## 2018-03-03 NOTE — ECHRPT
Indication:   cad

 

 CONCLUSIONS

 Normal left ventricular size. 

 The left ventricular systolic function is severely reduced with an estimated ejection fraction in th
e range of 

 20-25%.  Global hypokinesis

 

 Mild mitral valve regurgitation. 

 

 Aortic valve sclerosis is present. 

 No aortic valve regurgitation. 

 No aortic valve stenosis. 

 

 There is mild tricuspid valve regurgitation. 

 The estimated pulmonary arterial pressure is 47__ mmHg. 

 The pulmonary valve is not well visualized.  

 

 BP:        /         HR:                          Rhythm:

 

                                                   Technical Quality:Good

 

 FINDINGS

 

 LEFT VENTRICLE

 Normal left ventricular size. 

 The left ventricular systolic function is severely reduced with an estimated ejection fraction in th
e range of 

 20-25%. 

 

 RIGHT VENTRICLE

 Normal right ventricular size and systolic function.  

 

 LEFT ATRIUM

 The left atrial size is normal.  

 

 RIGHT ATRIUM

 The right atrial size is normal.  

 

 ATRIAL SEPTUM

 Normal atrial septal thickness without atrial level shunting by limited color doppler interrogation.
  

 

 AORTA

 The aortic root and proximal ascending aorta are normal in size on limited imaging.  

 

 MITRAL VALVE

 Structurally normal mitral valve. 

 Mild mitral valve regurgitation. 

 

 AORTIC VALVE

 Trileaflet aortic valve. 

 Aortic valve sclerosis is present. 

 No aortic valve regurgitation. 

 No aortic valve stenosis. 

 

 TRICUSPID VALVE

 Structurally normal tricuspid valve. 

 There is mild tricuspid valve regurgitation. 

 The estimated pulmonary arterial pressure is 47__ mmHg. 

 

 PULMONARY VALVE

 The pulmonary valve is not well visualized.  

 

 VESSELS

 The inferior vena cava is normal in size.  

 

 PERICARDIUM

 No pericardial effusion.  

 

 

 

 

  Ligia Beard MD, FACC

  (Electronically Signed)

  Final Date:03 March 2018 17:50
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
## 2018-03-03 NOTE — CATHPROC
[employer] [facility] Report

Study Information

Study Number    Admission          Scheduled Start             Study Start

 

[id number]    Mar 2 2018 9:15PM      03/03/2018                Mar 3 2018 2:19PM

 

Universal Service

 

Cardiac Catheterization

 

Admit Source               Facility Department

 

Emergency department           [facility] - Cath Lab

 

Physician and Clinical Staff

Initial Agusot Estrada          Circulator     Arley Ojeda,RN

 

                         Circulator     Neida Esteban,RN

 

                         Recorder      Rissa Calle,RT(R)

 

                         ScrAviva Hickey,RT(R) (BS)

 

Procedures Performed

Procedure                    Location (Site)            Vessel Name

 

Coronary Angiograms                LCA                 Left Coronary

Coronary Angiograms                RCA                 Right Coronary

Coronary Angiograms                SVG-LAD                Left Coronary

Coronary Angiograms               Gft. Stump 1              SVG Graft

Coronary Angiograms               Gft. Stump 2              SVG Graft

Drug Eluting Inflatio              CIRC Prox               CIRC

L Heart Cath

PTCA                       CIRC Prox               CIRC

Wire insertion                  Fem Art (right)            Femoral Art

Equipment

Time            Description           Size       Mfg Part Number  Used/Scraped

                   WIRE, BALANCE MIDDLEWEIGHT            [id number]

16:26    [employer]                   190CM                Used

                   190CM                       *[id number]

                   TRANSDUCER, TRUWAVE                [id number]

15:19    VALENTINE STEVENS                      *                  Used

                   W/STOCKCOCK                    *[id number]

                                            [phone]



                                            *[id number]

                                            534-660T



                                            *[id number]

                                            534-617T



                                            *[id number]

                                            534-621T



                                            *[id number]

                                            [phone]



                                            *[id number]

                                            [id number]

16:43    [employer] ANGIOSEAL, FR6 VIP         FR 6                Used

                                            *[id number]

                   WIRE, HYDROSTEER 150CM              [id number]

16:15    [employer]                  150CM                Used

                   ANGLED GLIDE                   *[id number]

                                            [id number]

15:19    [employer]    PACK, CCL CUSTOM        *                  Used

                                            *[id number]

                                            [id number]

15:19    [employer]      PEN, SKIN DUAL W/ RULER     *                  Used

                                            *[id number]

                   BALLOON, 2.25 X 12MM               [id number]

16:31    [employer]                        12MM                Used

                   EUPHORA                      *[id number]

                                            [id number]

16:36    [employer]        STENT, 2.75 12MM YESENIA      2.75 12MM              Used

                                            *[id number]

                                            [id number]

16:33    [employer]      30 EVE INDEFLATOR                          Used

                                            *[id number]

                                            [id number]

15:19    [employer]      SHEATH, FR6.5 PRELUDE 11CM   FR 6.5      [id number]      Used

                                            *[id number]

                                            [id number]

15:19    [employer]      WIRE, 3MMJ .035 180CM      180CM                Used

                                            *[id number]

                                            [id number]

15:19    NAMIC          MANIFOLD, 4 PORT        *                  Used

                                            *[id number]

15:19    [employer]         OMNIPAQUE, 350 MG, 100ML    100ML       [id number]      Used

                                            [id number]

15:19    [employer]      BLANKET,WARM AIR CCL      *                  Used

                                            *[id number]

 

Equipment Model, Serial, Lot Number and Expiration Data

Description              Model Number      Serial Number    Lot Number       Expiration Date

 

STENT, 2.75 12MM YESENIA         [id number]                 [id number]       06-

 

 

History: Current Medications

Medication         Dosage/Unit       Route      Frequency   Last Date/Time Taken

 

ASA

 

CARVEDILOL

 

 

History: Allergies

Allergy              Reaction

 

No Known Allergies

History: Risk Factors

Hypertension     Previous MI

 

Yes         Yes

Prior Valve

           Prior PCI       Prior PCIDate       Prior CABG     Prior CABGDate

Surgery

No          Yes          03/31/2015        Yes        01/01/2002

           Cerebrovascular    Peripheral Artery     Chronic Lung

On Dialysis                                       Diabetes

           Disease        Disease          Disease

No          No          Yes            Yes        No

 

 

History: Symptoms/Diagnosis Selection Items

Chest pain

 

 

History: Stress Tests

Stress or Imaging Studies Performed

 

No

 

 

History: Other Disease Selection Items

CAD

 

COPD

 

HTN

 

 

History: Other

Current Smoker      Method     Quit            Packs a Day       Years Used        Pack Years

 

No            Cigarettes   50 Years Ago        1            20            20

 

Labs

Hgb (g/dl)        Hct (%)        RBC (MIL/MM3)      WBC (l/cumm)       Platelets (thousands)

 

11.60-17.00        35.00-51.00      4.00-5.90        4.00-11.00        150..00

 

12.2           37          4.5           6.6            261

 

Glucose (mg/dl)      BUN (mg/dl)      Creatinine (mg/dl)    BUN:Creatinine (1:x)

74..00       7.00-18.00      0.50-1.30        10.00-20.00

 

102            24          1.3           18.5

 

Na (meq/l)        K (meq/l)       Cl (meq/l)        CO2 (mmol/L)

 

136..00       3.50-5.10       98..00       21.00-32.00

 

141            3.8          106           25.6

 

INR (PTT:PT)

 

0.90-1.10

 

1

 

Troponin I (ng/ml)    CPK (u/l)       CPK-MB (ng/ML)

 

0.02-0.05         26..00     0.50-3.60

 

1.07           189          6.6

 

 

 

 

Medication

Medication Total Dose (Bolus/Oral)

Medication              Total Dosage/Unit

 

1% XYLOCAINE                20 mL

 

ANGIOMAX BOLUS               15 mL

 

NTG (IC)                  200 mcg

 

OXYGEN                    2 l/min

 

PLAVIX                   600 mg

 

VERSED                    2 mg

 

Medications (Bolus/Oral)

Medication          Time Given           Dosage/Unit       Administered By      Reason

 

VERSED            3/3/2018 3:51:12 PM        1 mg         Jesenia Estebaninda

1 mg VERSED given in lab by Neida Esteban RN in Right Antecubital via Peripheral IV. Ordered by Agusto Barker.

 

VERSED            3/3/2018 3:52:24 PM        1 mg         Carlito, Neida

1 mg VERSED given in lab by Neida Esteban RN in Right Antecubital via Peripheral IV. Ordered by Agusto Barker.

 

1% XYLOCAINE         3/3/2018 3:54:06 PM       20 mL         Agusto Pang

20 mL 1% XYLOCAINE given in lab by Agusto Pang in Right Groin via Subcutaneous.

 

OXYGEN            3/3/2018 4:01:31 PM        2 l/min       Neida Esteban

2 l/min OXYGEN given in lab by Neida Esteban RN via Nasal.

 

ANGIOMAX BOLUS        3/3/2018 4:26:34 PM       15 mL         Arley Ojeda

15 mL ANGIOMAX BOLUS given in lab by Arley Ojeda RN in Right Antecubital via Peripheral IV. Ordere
d by Agusto Pang.

 

NTG (IC)           3/3/2018 4:34:01 PM      200 mcg         Agusto Pang

200 mcg NTG (IC) given in lab by Agusto Pang in Right Groin via Intra-coronary.

 

PLAVIX            3/3/2018 4:57:42 PM      600 mg         Andreas Estebana

600 mg PLAVIX given in lab by Neida Esteban RN via Oral. Ordered by Agusto Pang.

 

Medication (Drip)

Medication          Time Given           Dosage/Unit       Concentration/Unit  Diluent (ml)  Solution


 

ANGIOMAX DRIP         3/3/2018 4:28:30 PM     1.751 mg/kg/hr         250 mg     50       NaCl .9

1.751 mg/kg/hr ANGIOMAX DRIP given in lab by Arley Ojeda RN in Right Antecubital via Peripheral IV
. Pump/Drip Flow = 34.5 ml/hr using NaCl .9

with a concentration of 250 mg in 50 ml. Ordered by Agusto Pang.

ANGIOMAX DRIP

               3/3/2018 4:55:46 PM        0 units/hr                0

STOPPED

0 units/hr ANGIOMAX DRIP STOPPED given in lab by Neida Esteban RN in Right Antecubital via Peripher
al IV. Pump/Drip Flow = 34.5 ml/hr using

[Solution Name]. Ordered by Agusto Pang.

IV Solutions         3/3/2018 3:24:44 PM       50 mL (IV)                         NaCl .9

IV Solutions given in lab by Arley Ojeda RN in Right Antecubital via Peripheral IV. Pump/Drip Flow
 using NaCl .9.

Initial Case Assessment

Cardiovascular

HR           Rhythm          NIBP           Chest Pain

 

57           SR            147/83          0

 

Edema Present      Skin color           Skin

 

None           Normal             Warm Dry

 

Circulatory - Right Pulses

Dorsalis Pedis     Femoral

 

d            1

 

Scale (0,1,2,3,4,d)

 

Circulatory - Left Pulses

Dorsalis Pedis     Femoral

 

d            2

 

Scale (0,1,2,3,4,d)

 

Neurological State

            Oriented to time-place-

Alert                      Moves all extremities

            person

 

Respiration - General

Respiration Rate

            SpO2 (%)

(B/min)

15           99

 

Chronological Log

Time    Study Chronological Log

 

15:24:34  Patient arrived via Bed.

 

15:24:34  Patient Name, D.O.B, / Armband Verified By R.N.

 

15:24:35  Consent signed by the physician and the patient and verified by the Cath Lab staff.

 

15:24:35  Pre-op and post- op instructions given; patient acknowledges understanding of instructions.


 

15:24:36  Verbal Stimulation=2 Physical Stimulation=2 Airway=2 Respiration=2 TOTAL=8. (0=absent, 1=li
mited, 2=present)

 

15:24:38  Presedation assessment performed by Cath Lab RN.

 

15:24:39  Patient has been NPO for More than 6Hrs.

 

15:24:39  Skin Breakdown- none

 

15:24:40  Patient Warmer Placed on the Table.

 

15:24:41  Scarlet Prominences Protected

 

15:24:43  A # 20 IV was noted in the Antecubital (right). Grade = 0

 

15:24:43  A # 20 IV was noted in the Forearm (right). Grade = 0

 

15:24:44  IV Solutions given in lab by Arley Ojeda, RN in Right Antecubital via Peripheral IV. Pump
/Drip Flow using NaCl .9.

 

15:24:45  History and physical on the chart or being dictated.

      Assessment: Initial Case, HR=57 BPM, Rhythm=SR, HNZG=847/83 mmhg, Chest Pain=0, Edema=None, Col
or=Normal,

      Skin = Warm, Dry

      Right Pulses: Josiah Ped=d, Femoral=1

15:24:45

      Left Pulses: Josiah Ped=d, Femoral=2

      Neurological: State=Alert, Ox3, LOYOLA

      Respiration: Resp=15 B/min, SpO2=99 %

      Vitals capture started with the following parameters, Patient=Adult, Interval=5 min, Initial Pr
cvkbjk=270 mmHg,

15:29:42

      Deflation Rate=5 mmHg, Cuff placed on Right Ankle

15:30:22  HR=54 bpm, VVYH=855/79 mmhg, SpO2=99.0 %, Resp=11 B/min

 

15:35:19  HR=52 bpm, TXDU=351/83 mmhg, SpO2=97.0 %, Resp=19 B/min

 

15:37:11  Bilateral groins prepped with 2% chlorhexidine, and draped after a 3 minute waiting time.

 

15:38:47  MD paged

 

15:39:39  Reference ECG taken

 

15:40:02  MD arrived.

 

15:40:08  Pressure channel 1 zeroed.

 

15:40:24  HR=55 bpm, WVIG=512/71 mmhg, SpO2=99.0 %, Resp=13 B/min

 

15:45:17  HR=53 bpm, DSQK=667/75 mmhg, SpO2=96.0 %, Resp=12 B/min

 

15:50:20  HR=54 bpm, EUZI=210/70 mmhg, SpO2=94.0 %, Resp=16 B/min

      Time Out. Correct patient, correct procedure, correct physician, power injector not loaded with
 contrast with surgical

15:51:00

      team present. Time Out Concurred by MD and individual staff in procedure.

15:51:12  1 mg VERSED given in lab by Neida Esteban, RN in Right Antecubital via Peripheral IV. Orde
red by Agusto Pang.

 

15:52:24  1 mg VERSED given in lab by Neida Esteban, RN in Right Antecubital via Peripheral IV. Orde
red by Agusto Pang.

 

15:52:27  Case Start

 

15:54:06  20 mL 1% XYLOCAINE given in lab by Agusto Pang in Right Groin via Subcutaneous.

 

15:55:17  HR=61 bpm, AACE=252/73 mmhg, SpO2=91.0 %, Resp=13 B/min

 

15:57:50  Access site was Right Femoral Artery.

 

15:57:57  A SHEATH, FR6.5 PRELUDE 11CM FR 6.5 was advanced into the Fem Art (right) using the Percuta
neous technique.

      A JL 4.5 INFINITI CATHETER FR 6 was advanced over a wire. OMNIPAQUE, 350 MG, 100ML 100ML was us
ed for

15:58:55

      injections.

      Recorded Pressure: Ao, HR=65, Condition=Condition 1

16:00:06

      (Aorta) Ao 109/64/83

16:00:16  HR=60 bpm, HBCK=080/68 mmhg, SpO2=91.0 %, Resp=10 B/min

 

16:01:05  The  LCA was injected and visualized at various angles. OMNIPAQUE, 350 MG, 100ML 100ML used
.

 

16:01:31  2 l/min OXYGEN given in lab by Neida Esteban RN via Nasal.

      After removing the current catheter a JR 4.0 INFINITI CATHETER FR 6 was advanced over a WIRE, 3
MMJ .035 180CM

16:02:44

      180CM.

16:04:24  The  RCA was injected and visualized at various angles. OMNIPAQUE, 350 MG, 100ML 100ML used
.

 

16:05:15  HR=61 bpm, YYFM=348/68 mmhg, SpO2=96 %, Resp=14 B/min

      After removing the current catheter a JR 4.0 INFINITI CATHETER FR 6 was advanced over a WIRE, 3
MMJ .035 180CM

16:05:48

      180CM.

16:07:10  The SVG-LAD was injected and visualized at various angles. OMNIPAQUE, 350 MG, 100ML 100ML u
sed.

 

16:10:17  The Gft. Stump 1 to circ was injected and visualized at various angles. OMNIPAQUE, 350 MG, 
100ML 100ML used.

 

16:10:18  HR=54 bpm, ZCDH=012/62 mmhg, SpO2=95.0 %, Resp=14 B/min

      After removing the current catheter a UYEN INFINITI CATHETER FR 6 was advanced over a WIRE, 3MMJ
 .035 180CM

16:10:50

      180CM.

16:13:40  J Wire removed

 

16:14:02  A WIRE, HYDROSTEER 150CM ANGLED GLIDE 150CM was inserted via Fem Art (right).

 

16:15:17  HR=55 bpm, VOOS=721/57 mmhg, SpO2=96.0 %, Resp=13 B/min

 

16:16:34  Glide Wire removed

 

16:17:47  The Gft. Stump 2 (LIMA) was injected and visualized at various angles. OMNIPAQUE, 350 MG, 1
00ML 100ML used.

16:20:51  HR=62 bpm, IEJI=232/82 mmhg, SpO2=97.0 %, Resp=15 B/min

 

16:25:22  HR=59 bpm, HNXP=141/77 mmhg, SpO2=97.0 %, Resp=14 B/min

      After removing the current catheter a XB 4.5 GUIDE CATHETER FR 6 was advanced over a WIRE, 3MMJ
 .035 180CM

16:25:27

      180CM.

      15 mL ANGIOMAX BOLUS given in lab by Arley Ojeda RN in Right Antecubital via Peripheral IV. 
Ordered by Bird

16:26:34

      Agusto.

16:27:59  A WIRE, BALANCE MIDDLEWEIGHT 190CM 190CM was inserted via Fem Art (right).

      1.751 mg/kg/hr ANGIOMAX DRIP given in lab by Arley Ojeda RN in Right Antecubital via Periphe
ral IV. Pump/Drip

16:28:30

      Flow = 34.5 ml/hr using NaCl .9 with a concentration of 250 mg in 50 ml. Ordered by Rolando Pang

16:29:20  Interventional wire has crossed the lesion

 

16:30:21  HR=61 bpm, HBQZ=618/78 mmhg, SpO2=97.0 %, Resp=15 B/min

      A BALLOON, 2.25 X 12MM EUPHORA 12MM was inserted over WIRE, BALANCE MIDDLEWEIGHT 190CM 190CM vi
a the

16:32:10

      Fem Art (right).

      A BALLOON, 2.25 X 12MM EUPHORA 12MM over a WIRE, BALANCE MIDDLEWEIGHT 190CM 190CM in the CIRC P
john

16:32:55

      was inflated using a 30 EVE INDEFLATOR at 12 eve for 30 sec.

16:34:01  200 mcg NTG (IC) given in lab by Agusto Pang in Right Groin via Intra-coronary.

 

16:35:15  Balloon Removed.

 

16:35:24  HR=69 bpm, NQSA=514/65 mmhg, SpO2=96.0 %, Resp=11 B/min

      A STENT, 2.75 12MM YESENIA 2.75 12MM was advanced through a XB 4.5 GUIDE CATHETER FR 6 over a WIRE
, BALANCE

16:37:00

      MIDDLEWEIGHT 190CM 190CM.

      A STENT, 2.75 12MM YESENIA 2.75 12MM was deployed using a 30 EVE INDEFLATOR at 12 atmospheres for 
40 seconds

16:37:59

      in the CIRC Prox.

16:38:54  Delivery device removed

 

16:40:34  Wire removed

 

16:40:38  Catheter was removed

 

16:40:56  HR=68 bpm, AVTR=596/79 mmhg, SpO2=94.0 %, Resp=9 B/min

 

16:41:26  An injection in the Fem Art (right) was made through the SHEATH, FR6.5 PRELUDE 11CM FR 6.5.


 

16:42:56  ANGIOSEAL, FR6 VIP FR 6 placement in the Fem Art (right)

 

16:45:24  HR=61 bpm, GANM=736/73 mmhg, SpO2=97.0 %, Resp=13 B/min

 

16:46:15  Case End

 

16:48:59  Sterile dressing applied to site

 

16:49:00  No case complications noted.

 

16:49:01  Cine recording checked.

 

16:49:05  Bedside Report will be given.

 

16:49:06  Implantable Device card placed in patient's chart.

 

16:49:07  Report called to floor.

 

16:49:13  A Left Heart Cath was performed.

 

16:50:00  Leaking from angioseal site, pressure being held

 

16:51:00  HR=59 bpm, AKSP=351/79 mmhg, SpO2=98.0 %, Resp=13 B/min

 

16:55:24  HR=59 bpm, ULKG=464/69 mmhg, SpO2=97.0 %, Resp=15 B/min

      0 units/hr ANGIOMAX DRIP STOPPED given in lab by Carlito, Neida, RN in Right Antecubital via Pe
ripheral IV.

16:55:46

      Pump/Drip Flow = 34.5 ml/hr using [Solution Name]. Ordered by Agusto Pang.

16:57:42  600 mg PLAVIX given in lab by Neida Esteban, RN via Oral. Ordered by Agusto Pang.

 

17:00:56  HR=59 bpm, HWIO=040/72 mmhg, SpO2=97.0 %, Resp=9 B/min

 

17:05:20  HR=57 bpm, SQMX=885/75 mmhg, SpO2=98.0 %, Resp=12 B/min

17:10:56   HR=58 bpm, GYBP=167/64 mmhg, SpO2=97.0 %, Resp=13 B/min

 

17:15:22   HR=58 bpm, SOMY=593/78 mmhg, SpO2=96 %, Resp=14 B/min

 

17:20:27   HR=56 bpm, YCRP=897/72 mmhg, SpO2=96.0 %, Resp=17 B/min

 

17:20:48   Site stopped leaking

 

17:23:15   Patient moved to [facility]

 

 

 

 

End Study - Contrast Media Used In Study

Contrast       Total Opened (mL)  Total Used (mL)   Total Wasted (mL)

 

Omnipaque       120         120         0

 

End Study - Maximum Contrast Load

Max Contrast Load (mL)

 

378.8

 

End Study - Radiation Exposure

Fluoro Time

(minutes)

11.5

 

 

End Study - Patient Disposition

Complications     Transferred To

 

          Critical Care Bed

## 2018-03-03 NOTE — EKG
Date Performed: 03/02/2018       Time Performed: 19:41:57

 

PTAGE:      78 years

 

EKG:      Sinus rhythm 

 

 INFERIOR MYOCARDIAL INFARCTION ABNORMAL ECG

 

PREVIOUS TRACING       : 02/10/2018 18.11

 

DOCTOR:   Juan Jose Kaur  Interpretating Date/Time  03/03/2018 09:08:05

## 2018-03-03 NOTE — MA
cc:

Agusto Pang MD

****

 

 

03/03/2018

 

PROCEDURE:

Coronary angiography, bypass graft angiography, balloon angioplasty 

and stenting of the proximal left circumflex artery.

 

DESCRIPTION OF PROCEDURE:

The patient was brought to the cardiac catheterization lab in a 

fasting state.  Using fluoroscopy and using 1% lidocaine for local 

anesthesia, a 6.5 Tamazight sheath was inserted in the right femoral 

artery.  Left coronary angiography was completed using a left 4.5 

Keanu catheter.  Right coronary angiography was completed using a 

3DRC catheter.  Angiography of the 2 vein grafts was performed using a

right Keanu catheter.  An internal mammary diagnostic catheter was 

used to gain access to the left subclavian.  A left subclavian 

angiogram was performed showing occlusion of the left internal mammary

bypass.  Attention was then turned towards intervention of the 

circumflex artery.  Intravenous Angiomax was started.  A 6-Tamazight XB 

4.5 guiding catheter engaged the left main nicely.  Circumflex artery 

was wired with a BMW wire, it was predilated with a 2.25 mm balloon 

and then stented using a 2.7 x 12 mm Resolute stent at 12 atmospheres.

 Angiography demonstrated an excellent result.  Guiding catheter was 

removed.  Angiography was then obtained of the right femoral artery 

via the sheath followed by Angio-Seal placement.  After deployment of 

the Angio-Seal device, there was bleeding that required manual 

compression, which is ongoing at the time of this dictation.  

Estimated blood loss was 120 mL.

 

 

FINDINGS:

1.  Hemodynamics:  Aortic pressure is 109/64 with a mean of 83.

2.  Coronary angiography:  Left main coronary artery is short and 

normal-appearing.  Left anterior descending artery tapers to a 90% 

proximal stenosis, LAD has 90% stenosis after the first diagonal 

branch, first diagonal branch has 80-90% disease, the zone of disease 

from the proximal LAD through the proximal portion of the diagonal 

branch is very long, distally the diagonal is a fairly small caliber 

vessel.  There is competitive flow in the LAD from the LAD graft.  

Circumflex artery has a 90% proximal stenosis.  First obtuse marginal 

branch is small and comes right out off just distal to the stenosis.  

The second obtuse marginal branch is totally occluded.  There is a 

small posterolateral branch given off distally.  The right coronary 

artery is occluded proximally with left-to-left and right-to-left 

collaterals.

3.  Bypass grafts:  The left internal mammary graft is occluded.  

There is a patent saphenous vein graft to the mid LAD that is widely 

patent.  The LAD wraps around the apex.  There is about 30% mid to 

distal LAD disease.  There is also flow proximal to the anastomosis as

well.  The vein graft to the obtuse marginal graft is totally occluded

at the aorta.

4.  Results of intervention:  Following stenting of the proximal 

circumflex stenosis, the 90% stenosis had been reduced to 0% without 

evidence of thrombus or dissection and no compromise of the first 

obtuse marginal branch.

 

CONCLUSIONS:

1.  Three-vessel coronary artery disease.

2.  Only 1 residual patent graft, which is a vein graft to the mid LAD

and is widely patent.

3.  Total occlusion of the right coronary artery, but is well 

collateralized.

4.  Critical stenosis of the proximal circumflex vessel, now 

successfully stented.

5.  Occlusion of the vein graft to the obtuse marginal graft, which 

however is fed by collaterals from the LAD from the graft.

 

PLAN:

The patient will be maintained on aspirin and Plavix.

 

 

 

__________________________________

MD NED Mcqueen/cc

D: 03/03/2018, 05:03 PM

T: 03/03/2018, 06:22 PM

Visit #: [id number]

Job #: [id number]
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
## 2018-03-04 VITALS — HEART RATE: 62 BPM

## 2018-03-04 VITALS — HEART RATE: 64 BPM

## 2018-03-04 VITALS — HEART RATE: 56 BPM

## 2018-03-04 VITALS
RESPIRATION RATE: 18 BRPM | DIASTOLIC BLOOD PRESSURE: 78 MMHG | HEART RATE: 58 BPM | OXYGEN SATURATION: 98 % | SYSTOLIC BLOOD PRESSURE: 140 MMHG | TEMPERATURE: 97.8 F

## 2018-03-04 VITALS
DIASTOLIC BLOOD PRESSURE: 87 MMHG | HEART RATE: 68 BPM | OXYGEN SATURATION: 95 % | SYSTOLIC BLOOD PRESSURE: 148 MMHG | RESPIRATION RATE: 18 BRPM | TEMPERATURE: 98.2 F

## 2018-03-04 VITALS
DIASTOLIC BLOOD PRESSURE: 79 MMHG | SYSTOLIC BLOOD PRESSURE: 132 MMHG | HEART RATE: 67 BPM | OXYGEN SATURATION: 98 % | TEMPERATURE: 97.3 F | RESPIRATION RATE: 20 BRPM

## 2018-03-04 VITALS — HEART RATE: 55 BPM

## 2018-03-04 VITALS — HEART RATE: 63 BPM

## 2018-03-04 VITALS — HEART RATE: 60 BPM

## 2018-03-04 VITALS
HEART RATE: 70 BPM | RESPIRATION RATE: 18 BRPM | SYSTOLIC BLOOD PRESSURE: 155 MMHG | DIASTOLIC BLOOD PRESSURE: 87 MMHG | TEMPERATURE: 97.9 F | OXYGEN SATURATION: 98 %

## 2018-03-04 VITALS
HEART RATE: 62 BPM | DIASTOLIC BLOOD PRESSURE: 71 MMHG | OXYGEN SATURATION: 99 % | RESPIRATION RATE: 16 BRPM | SYSTOLIC BLOOD PRESSURE: 103 MMHG

## 2018-03-04 VITALS — OXYGEN SATURATION: 98 %

## 2018-03-04 VITALS — HEART RATE: 71 BPM

## 2018-03-04 VITALS
OXYGEN SATURATION: 94 % | RESPIRATION RATE: 16 BRPM | SYSTOLIC BLOOD PRESSURE: 118 MMHG | HEART RATE: 65 BPM | DIASTOLIC BLOOD PRESSURE: 72 MMHG

## 2018-03-04 VITALS — HEART RATE: 72 BPM

## 2018-03-04 VITALS — HEART RATE: 58 BPM

## 2018-03-04 VITALS — HEART RATE: 86 BPM

## 2018-03-04 VITALS — HEART RATE: 68 BPM

## 2018-03-04 LAB
BASOPHILS # BLD AUTO: 0 TH/MM3 (ref 0–0.2)
BASOPHILS NFR BLD: 0.4 % (ref 0–2)
BUN SERPL-MCNC: 16 MG/DL (ref 7–18)
CALCIUM SERPL-MCNC: 7.7 MG/DL (ref 8.5–10.1)
CHLORIDE SERPL-SCNC: 112 MEQ/L (ref 98–107)
CREAT SERPL-MCNC: 1.17 MG/DL (ref 0.6–1.3)
EOSINOPHIL # BLD: 0.2 TH/MM3 (ref 0–0.4)
EOSINOPHIL NFR BLD: 3.4 % (ref 0–4)
ERYTHROCYTE [DISTWIDTH] IN BLOOD BY AUTOMATED COUNT: 16.3 % (ref 11.6–17.2)
GFR SERPLBLD BASED ON 1.73 SQ M-ARVRAT: 61 ML/MIN (ref 89–?)
GLUCOSE SERPL-MCNC: 92 MG/DL (ref 74–106)
HCO3 BLD-SCNC: 23.2 MEQ/L (ref 21–32)
HCT VFR BLD CALC: 34.5 % (ref 39–51)
HGB BLD-MCNC: 11.2 GM/DL (ref 13–17)
LYMPHOCYTES # BLD AUTO: 0.8 TH/MM3 (ref 1–4.8)
LYMPHOCYTES NFR BLD AUTO: 10.8 % (ref 9–44)
MAGNESIUM SERPL-MCNC: 2.3 MG/DL (ref 1.5–2.5)
MCH RBC QN AUTO: 26.1 PG (ref 27–34)
MCHC RBC AUTO-ENTMCNC: 32.4 % (ref 32–36)
MCV RBC AUTO: 80.6 FL (ref 80–100)
MONOCYTE #: 0.8 TH/MM3 (ref 0–0.9)
MONOCYTES NFR BLD: 10.9 % (ref 0–8)
NEUTROPHILS # BLD AUTO: 5.3 TH/MM3 (ref 1.8–7.7)
NEUTROPHILS NFR BLD AUTO: 74.5 % (ref 16–70)
PLATELET # BLD: 248 TH/MM3 (ref 150–450)
PMV BLD AUTO: 7 FL (ref 7–11)
RBC # BLD AUTO: 4.28 MIL/MM3 (ref 4.5–5.9)
SODIUM SERPL-SCNC: 141 MEQ/L (ref 136–145)
WBC # BLD AUTO: 7.2 TH/MM3 (ref 4–11)

## 2018-03-04 RX ADMIN — CLOPIDOGREL BISULFATE SCH MG: 75 TABLET, FILM COATED ORAL at 09:50

## 2018-03-04 RX ADMIN — Medication SCH ML: at 21:37

## 2018-03-04 RX ADMIN — Medication SCH ML: at 09:00

## 2018-03-04 RX ADMIN — PRAVASTATIN SODIUM SCH MG: 40 TABLET ORAL at 09:51

## 2018-03-04 RX ADMIN — PHENYTOIN SODIUM SCH MLS/HR: 50 INJECTION INTRAMUSCULAR; INTRAVENOUS at 05:15

## 2018-03-04 RX ADMIN — BUDESONIDE AND FORMOTEROL FUMARATE DIHYDRATE SCH PUFF: 80; 4.5 AEROSOL RESPIRATORY (INHALATION) at 21:38

## 2018-03-04 RX ADMIN — CHLORHEXIDINE GLUCONATE SCH PACK: 500 CLOTH TOPICAL at 04:00

## 2018-03-04 RX ADMIN — BUDESONIDE AND FORMOTEROL FUMARATE DIHYDRATE SCH PUFF: 80; 4.5 AEROSOL RESPIRATORY (INHALATION) at 09:00

## 2018-03-04 RX ADMIN — STANDARDIZED SENNA CONCENTRATE AND DOCUSATE SODIUM SCH TAB: 8.6; 5 TABLET, FILM COATED ORAL at 21:00

## 2018-03-04 RX ADMIN — Medication SCH ML: at 21:00

## 2018-03-04 RX ADMIN — NITROGLYCERIN SCH INCH: 20 OINTMENT TOPICAL at 05:15

## 2018-03-04 RX ADMIN — STANDARDIZED SENNA CONCENTRATE AND DOCUSATE SODIUM SCH TAB: 8.6; 5 TABLET, FILM COATED ORAL at 09:50

## 2018-03-04 RX ADMIN — CARVEDILOL SCH MG: 3.12 TABLET, FILM COATED ORAL at 09:51

## 2018-03-04 RX ADMIN — PANTOPRAZOLE SCH MG: 40 TABLET, DELAYED RELEASE ORAL at 09:50

## 2018-03-04 NOTE — EKG
Date Performed: 03/03/2018       Time Performed: 19:57:52

 

PTAGE:      78 years

 

EKG:      Sinus rhythm 

 

. Prolonged QT interval Inferior infarct - age undetermined Ant/septal and lateral ST-T changes may b
e due to myocardial ischemia Abnormal ECG

 

PREVIOUS TRACING       : 03/03/2018 11.18

 

DOCTOR:   Juan Jose Kaur  Interpretating Date/Time  03/04/2018 10:06:46

## 2018-03-04 NOTE — PD.CARD.PN
Subjective


Subjective Remarks


Tired, dyspneic





Objective


Medications





Current Medications








 Medications


  (Trade)  Dose


 Ordered  Sig/Armen


 Route  Start Time


 Stop Time Status Last Admin


 


  (Heparin Inj)  5,000 units  UNSCH  PRN


 IV  3/3/18 03:00


     


 


 


  (Heparin Inj)  2,500 units  UNSCH  PRN


 IV  3/3/18 03:00


     


 


 


 Heparin Sodium/


 Dextrose  250 ml @ 


 10.8 mls/hr  TITRATE  PRN


 IV  3/2/18 21:00


    3/2/18 21:20


 


 


  (NS Flush)  2 ml  UNSCH  PRN


 IV FLUSH  3/2/18 21:15


     


 


 


  (NS Flush)  2 ml  BID


 IV FLUSH  3/3/18 09:00


    3/3/18 08:43


 


 


  (Zofran Inj)  4 mg  Q6H  PRN


 IVP  3/2/18 21:15


     


 


 


  (Tylenol)  650 mg  Q6H  PRN


 PO  3/2/18 21:15


     


 


 


  (Norco  5-325 Mg)  1 tab  Q4H  PRN


 PO  3/2/18 21:15


     


 


 


  (Morphine Inj)  2 mg  Q3H  PRN


 IV PUSH  3/2/18 21:15


     


 


 


  (Mariely-Colace)  1 tab  BID


 PO  3/3/18 09:00


    3/4/18 09:50


 


 


  (Milk Of


 Magnesia Liq)  30 ml  Q12H  PRN


 PO  3/2/18 21:15


     


 


 


  (Senokot)  17.2 mg  Q12H  PRN


 PO  3/2/18 21:15


     


 


 


  (Dulcolax Supp)  10 mg  DAILY  PRN


 RECTAL  3/2/18 21:15


     


 


 


  (Lactulose Liq)  30 ml  DAILY  PRN


 PO  3/2/18 21:15


     


 


 


  (Nitroglycerin


 2% Oint)  0.5 inch  Q6HR  PRN


 TOPICAL  3/2/18 21:15


     


 


 


  (Symbicort


 80-4.5 Mcg Inh)  1 puff  Q12HR


 INH  3/3/18 09:00


    3/4/18 09:00


 


 


  (Protonix)  40 mg  DAILY


 PO  3/3/18 09:00


    3/4/18 09:50


 


 


  (Pravachol)  40 mg  DAILY


 PO  3/3/18 09:00


    3/4/18 09:51


 


 


  (Duoneb Neb)  1 ampule  Q4HR NEB  PRN


 NEB  3/2/18 23:00


    3/3/18 19:49


 


 


 Miscellaneous


 Information  Patient in


 critical care


 unit?  Ass...  Q361D


 .XX  3/3/18 06:45


    3/3/18 06:45


 


 


  (Chlorhexidine


 2% Cloth)  3 pack  DAILY@04


 TOPICAL  3/4/18 04:00


 3/8/18 04:01   


 


 


  (Chlorhexidine


 2% Cloth)  3 pack  UNSCH  PRN


 TOPICAL  3/3/18 06:45


 3/8/18 06:42   


 


 


  (Aspirin)  325 mg  ON  CALL


 PO  3/3/18 11:45


 3/7/18 11:44   


 


 


  (Benadryl)  50 mg  ON  CALL


 PO  3/3/18 11:45


 3/7/18 11:44  3/3/18 15:18


 


 


 Sodium Chloride  1,000 ml @ 


 100 mls/hr  Q10H


 IV  3/3/18 20:00


 3/4/18 19:59  3/4/18 05:15


 


 


  (NS Flush)  2 ml  UNSCH  PRN


 IV FLUSH  3/3/18 19:00


     


 


 


  (NS Flush)  2 ml  BID


 IV FLUSH  3/3/18 21:00


     


 


 


  (Tylenol)  325 mg  Q4H  PRN


 PO  3/3/18 19:00


     


 


 


  (Percocet  5-325


 Mg)  1 tab  Q4H  PRN


 PO  3/3/18 19:00


    3/3/18 20:06


 


 


  (Percocet 


 Mg)  1 tab  Q4H  PRN


 PO  3/3/18 19:00


     


 


 


  (Aspirin Chew)  81 mg  DAILY


 PO  3/4/18 09:00


    3/4/18 09:51


 


 


  (Plavix)  75 mg  DAILY


 PO  3/4/18 09:00


    3/4/18 09:50


 


 


  (Nitroglycerin


 2% Oint)  0.5 inch  Q6HR


 TOPICAL  3/5/18 04:00


   UNV  


 


 


  (Imdur)  30 mg  DAILY@07


 PO  3/5/18 07:00


     


 








Vital Signs / I&O





Vital Signs








  Date Time  Temp Pulse Resp B/P (MAP) Pulse Ox O2 Delivery O2 Flow Rate FiO2


 


3/4/18 13:57     98 Nasal Cannula 2.00 


 


3/4/18 07:30 98.2 68 18 148/87 (107) 95   


 


3/4/18 06:00  62      


 


3/4/18 05:00  60      


 


3/4/18 04:15  62 16 103/71 (82) 99   


 


3/4/18 04:00  58      


 


3/4/18 03:00  55      


 


3/4/18 02:00  60      


 


3/4/18 01:00  56      


 


3/4/18 00:00  56      


 


3/3/18 23:00  57      


 


3/3/18 23:00  56 18 103/72 (82) 98   


 


3/3/18 22:00  64      


 


3/3/18 21:00  60      


 


3/3/18 20:00  64      


 


3/3/18 19:49     97 Nasal Cannula 2.00 


 


3/3/18 19:45 98.2 68 20 95/77 (83) 97   


 


3/3/18 19:00  71      


 


3/3/18 18:00  56      


 


3/3/18 14:00  53      














I/O      


 


 3/3/18 3/3/18 3/3/18 3/4/18 3/4/18 3/4/18





 07:00 15:00 23:00 07:00 15:00 23:00


 


Intake Total   240 ml 480 ml  


 


Output Total 250 ml   1300 ml  


 


Balance -250 ml  240 ml -820 ml  


 


      


 


Intake Oral   240 ml 480 ml  


 


Output Urine Total 250 ml   1300 ml  


 


Stool Total    0 ml  








Physical Exam


Alert


Mildly tachypneic


Chest severely diminished BS


CV S1S@ RRR


Right groin no hematoma


No edema


Laboratory





Laboratory Tests








Test


  3/4/18


04:00 3/4/18


07:15


 


Blood Urea Nitrogen 16 MG/DL  


 


Creatinine 1.17 MG/DL  


 


Random Glucose 92 MG/DL  


 


Calcium Level 7.7 MG/DL  


 


Magnesium Level 2.3 MG/DL  


 


Sodium Level 141 MEQ/L  


 


Potassium Level 4.2 MEQ/L  


 


Chloride Level 112 MEQ/L  


 


Carbon Dioxide Level 23.2 MEQ/L  


 


Anion Gap 6 MEQ/L  


 


Estimat Glomerular Filtration


Rate 61 ML/MIN 


  


 


 


Total Creatine Kinase 155 U/L  


 


White Blood Count  7.2 TH/MM3 


 


Red Blood Count  4.28 MIL/MM3 


 


Hemoglobin  11.2 GM/DL 


 


Hematocrit  34.5 % 


 


Mean Corpuscular Volume  80.6 FL 


 


Mean Corpuscular Hemoglobin  26.1 PG 


 


Mean Corpuscular Hemoglobin


Concent 


  32.4 % 


 


 


Red Cell Distribution Width  16.3 % 


 


Platelet Count  248 TH/MM3 


 


Mean Platelet Volume  7.0 FL 


 


Neutrophils (%) (Auto)  74.5 % 


 


Lymphocytes (%) (Auto)  10.8 % 


 


Monocytes (%) (Auto)  10.9 % 


 


Eosinophils (%) (Auto)  3.4 % 


 


Basophils (%) (Auto)  0.4 % 


 


Neutrophils # (Auto)  5.3 TH/MM3 


 


Lymphocytes # (Auto)  0.8 TH/MM3 


 


Monocytes # (Auto)  0.8 TH/MM3 


 


Eosinophils # (Auto)  0.2 TH/MM3 


 


Basophils # (Auto)  0.0 TH/MM3 


 


CBC Comment  DIFF FINAL 


 


Differential Comment   








Imaging





Last 48 hours Impressions








Chest X-Ray 3/2/18 1223 Signed





Impressions: 





 Service Date/Time:  Friday, March 2, 2018 20:07 - CONCLUSION:  1. Moderate-

sized 





 hiatal hernia.  2. No acute cardiopulmonary disease.     Anjel Murphy MD 











Assessment and Plan


Problem List:  


(1) Stented coronary artery


ICD Codes:  Z95.5 - Presence of coronary angioplasty implant and graft


(2) Morbid obesity


ICD Codes:  E66.01 - Morbid obesity


Status:  Acute


(3) NSTEMI (non-ST elevated myocardial infarction)


ICD Codes:  I21.4 - Non-ST elevation (NSTEMI) myocardial infarction


(4) COPD (chronic obstructive pulmonary disease)


ICD Codes:  J44.9 - Chronic obstructive pulmonary disease, unspecified











Agusto Pang MD Mar 4, 2018 14:00

## 2018-03-04 NOTE — EKG
Date Performed: 03/03/2018       Time Performed: 11:18:12

 

PTAGE:      78 years

 

EKG:      Sinus rhythm 

 

 Prolonged QT interval Inferior infarct - age undetermined Ant/septal and lateral ST-T changes may be
 due to myocardial ischemia Abnormal ECG 

 

NO PREVIOUS TRACING            

 

DOCTOR:   Juan Jose Kaur  Interpretating Date/Time  03/04/2018 13:18:47
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
## 2018-03-05 ENCOUNTER — HOSPITAL ENCOUNTER (INPATIENT)
Dept: HOSPITAL 17 - H250 | Age: 78
LOS: 3 days | Discharge: HOME | DRG: 885 | End: 2018-03-08
Attending: PSYCHIATRY & NEUROLOGY | Admitting: PSYCHIATRY & NEUROLOGY
Payer: OTHER GOVERNMENT

## 2018-03-05 VITALS
HEART RATE: 56 BPM | TEMPERATURE: 97.9 F | RESPIRATION RATE: 16 BRPM | DIASTOLIC BLOOD PRESSURE: 72 MMHG | OXYGEN SATURATION: 95 % | SYSTOLIC BLOOD PRESSURE: 121 MMHG

## 2018-03-05 VITALS
RESPIRATION RATE: 18 BRPM | TEMPERATURE: 97.6 F | SYSTOLIC BLOOD PRESSURE: 124 MMHG | OXYGEN SATURATION: 93 % | DIASTOLIC BLOOD PRESSURE: 62 MMHG | HEART RATE: 68 BPM

## 2018-03-05 VITALS
TEMPERATURE: 97.8 F | HEART RATE: 68 BPM | SYSTOLIC BLOOD PRESSURE: 138 MMHG | RESPIRATION RATE: 18 BRPM | OXYGEN SATURATION: 94 % | DIASTOLIC BLOOD PRESSURE: 66 MMHG

## 2018-03-05 VITALS — HEART RATE: 58 BPM

## 2018-03-05 VITALS — HEART RATE: 72 BPM

## 2018-03-05 VITALS
RESPIRATION RATE: 16 BRPM | DIASTOLIC BLOOD PRESSURE: 82 MMHG | SYSTOLIC BLOOD PRESSURE: 136 MMHG | HEART RATE: 66 BPM | OXYGEN SATURATION: 95 %

## 2018-03-05 VITALS — HEART RATE: 60 BPM

## 2018-03-05 VITALS — BODY MASS INDEX: 33.69 KG/M2 | HEIGHT: 66 IN | WEIGHT: 209.66 LBS

## 2018-03-05 VITALS
OXYGEN SATURATION: 98 % | SYSTOLIC BLOOD PRESSURE: 151 MMHG | DIASTOLIC BLOOD PRESSURE: 94 MMHG | HEART RATE: 62 BPM | RESPIRATION RATE: 18 BRPM

## 2018-03-05 VITALS
RESPIRATION RATE: 16 BRPM | TEMPERATURE: 98.2 F | OXYGEN SATURATION: 96 % | HEART RATE: 70 BPM | DIASTOLIC BLOOD PRESSURE: 80 MMHG | SYSTOLIC BLOOD PRESSURE: 141 MMHG

## 2018-03-05 VITALS — HEART RATE: 64 BPM

## 2018-03-05 VITALS — HEART RATE: 66 BPM

## 2018-03-05 VITALS — HEART RATE: 67 BPM

## 2018-03-05 VITALS — HEART RATE: 68 BPM

## 2018-03-05 VITALS — HEART RATE: 62 BPM

## 2018-03-05 VITALS — HEART RATE: 54 BPM

## 2018-03-05 DIAGNOSIS — N18.9: ICD-10-CM

## 2018-03-05 DIAGNOSIS — D50.9: ICD-10-CM

## 2018-03-05 DIAGNOSIS — Z95.5: ICD-10-CM

## 2018-03-05 DIAGNOSIS — I25.5: ICD-10-CM

## 2018-03-05 DIAGNOSIS — I21.4: ICD-10-CM

## 2018-03-05 DIAGNOSIS — J44.9: ICD-10-CM

## 2018-03-05 DIAGNOSIS — Z66: ICD-10-CM

## 2018-03-05 DIAGNOSIS — Z95.1: ICD-10-CM

## 2018-03-05 DIAGNOSIS — N17.9: ICD-10-CM

## 2018-03-05 DIAGNOSIS — F33.2: Primary | ICD-10-CM

## 2018-03-05 DIAGNOSIS — I25.10: ICD-10-CM

## 2018-03-05 DIAGNOSIS — E78.5: ICD-10-CM

## 2018-03-05 DIAGNOSIS — I12.9: ICD-10-CM

## 2018-03-05 DIAGNOSIS — R45.851: ICD-10-CM

## 2018-03-05 DIAGNOSIS — F10.21: ICD-10-CM

## 2018-03-05 DIAGNOSIS — J96.10: ICD-10-CM

## 2018-03-05 LAB
BUN SERPL-MCNC: 16 MG/DL (ref 7–18)
CALCIUM SERPL-MCNC: 8.5 MG/DL (ref 8.5–10.1)
CHLORIDE SERPL-SCNC: 109 MEQ/L (ref 98–107)
CREAT SERPL-MCNC: 1.32 MG/DL (ref 0.6–1.3)
ERYTHROCYTE [DISTWIDTH] IN BLOOD BY AUTOMATED COUNT: 16.5 % (ref 11.6–17.2)
GFR SERPLBLD BASED ON 1.73 SQ M-ARVRAT: 53 ML/MIN (ref 89–?)
GLUCOSE SERPL-MCNC: 94 MG/DL (ref 74–106)
HCO3 BLD-SCNC: 23.4 MEQ/L (ref 21–32)
HCT VFR BLD CALC: 33.6 % (ref 39–51)
HGB BLD-MCNC: 11.1 GM/DL (ref 13–17)
MCH RBC QN AUTO: 26.3 PG (ref 27–34)
MCHC RBC AUTO-ENTMCNC: 32.9 % (ref 32–36)
MCV RBC AUTO: 80 FL (ref 80–100)
PLATELET # BLD: 250 TH/MM3 (ref 150–450)
PMV BLD AUTO: 6.9 FL (ref 7–11)
RBC # BLD AUTO: 4.2 MIL/MM3 (ref 4.5–5.9)
SODIUM SERPL-SCNC: 142 MEQ/L (ref 136–145)
WBC # BLD AUTO: 7 TH/MM3 (ref 4–11)

## 2018-03-05 PROCEDURE — 80048 BASIC METABOLIC PNL TOTAL CA: CPT

## 2018-03-05 PROCEDURE — 83735 ASSAY OF MAGNESIUM: CPT

## 2018-03-05 PROCEDURE — 80061 LIPID PANEL: CPT

## 2018-03-05 PROCEDURE — 83036 HEMOGLOBIN GLYCOSYLATED A1C: CPT

## 2018-03-05 PROCEDURE — 93005 ELECTROCARDIOGRAM TRACING: CPT

## 2018-03-05 PROCEDURE — 94640 AIRWAY INHALATION TREATMENT: CPT

## 2018-03-05 PROCEDURE — 84484 ASSAY OF TROPONIN QUANT: CPT

## 2018-03-05 PROCEDURE — 94664 DEMO&/EVAL PT USE INHALER: CPT

## 2018-03-05 RX ADMIN — Medication SCH ML: at 09:00

## 2018-03-05 RX ADMIN — PANTOPRAZOLE SCH MG: 40 TABLET, DELAYED RELEASE ORAL at 10:38

## 2018-03-05 RX ADMIN — Medication SCH ML: at 21:00

## 2018-03-05 RX ADMIN — CLOPIDOGREL BISULFATE SCH MG: 75 TABLET, FILM COATED ORAL at 10:39

## 2018-03-05 RX ADMIN — BUDESONIDE AND FORMOTEROL FUMARATE DIHYDRATE SCH PUFF: 80; 4.5 AEROSOL RESPIRATORY (INHALATION) at 09:00

## 2018-03-05 RX ADMIN — CHLORHEXIDINE GLUCONATE SCH PACK: 500 CLOTH TOPICAL at 04:00

## 2018-03-05 RX ADMIN — BUDESONIDE AND FORMOTEROL FUMARATE DIHYDRATE SCH PUFF: 80; 4.5 AEROSOL RESPIRATORY (INHALATION) at 21:03

## 2018-03-05 RX ADMIN — STANDARDIZED SENNA CONCENTRATE AND DOCUSATE SODIUM SCH TAB: 8.6; 5 TABLET, FILM COATED ORAL at 21:03

## 2018-03-05 RX ADMIN — PRAVASTATIN SODIUM SCH MG: 40 TABLET ORAL at 10:38

## 2018-03-05 RX ADMIN — Medication SCH ML: at 21:03

## 2018-03-05 RX ADMIN — STANDARDIZED SENNA CONCENTRATE AND DOCUSATE SODIUM SCH TAB: 8.6; 5 TABLET, FILM COATED ORAL at 10:37

## 2018-03-05 NOTE — PD.CARD.PN
Subjective


Subjective Remarks


Mild to moderate dyspnea with minimal exertion.  No CP, dizziness, PND, 

palpitations.





Objective


Medications











Item Value  Date Time


 


Isosorbide 30 mg 3/5/18 0700





Mononitrate DAILY@07/PO 3/5/18 0637





 (Imdur)  


 


Nitroglycerin 0.5 inch 3/5/18 0400





 (Nitroglycerin Q6H/TOPICAL 3/5/18 0448





2% Oint)  


 


Valsartan 40 mg 3/4/18 2100





 (Diovan) BID/PO 3/4/18 2137


 


Aspirin 81 mg 3/4/18 0900





 (Aspirin Chew) DAILY/PO 3/4/18 0951


 


Clopidogrel 75 mg 3/4/18 0900





Bisulfate DAILY/PO 3/4/18 0950





 (Plavix)  


 


Pravastatin Sodium 40 mg 3/3/18 0900





 (Pravachol) DAILY/PO 3/4/18 0951











Current Medications








 Medications


  (Trade)  Dose


 Ordered  Sig/Armen


 Route  Start Time


 Stop Time Status Last Admin


 


  (Heparin Inj)  5,000 units  UNSCH  PRN


 IV  3/3/18 03:00


     


 


 


  (Heparin Inj)  2,500 units  UNSCH  PRN


 IV  3/3/18 03:00


     


 


 


 Heparin Sodium/


 Dextrose  250 ml @ 


 10.8 mls/hr  TITRATE  PRN


 IV  3/2/18 21:00


    3/2/18 21:20


 


 


  (NS Flush)  2 ml  UNSCH  PRN


 IV FLUSH  3/2/18 21:15


     


 


 


  (NS Flush)  2 ml  BID


 IV FLUSH  3/3/18 09:00


    3/4/18 21:37


 


 


  (Zofran Inj)  4 mg  Q6H  PRN


 IVP  3/2/18 21:15


     


 


 


  (Tylenol)  650 mg  Q6H  PRN


 PO  3/2/18 21:15


     


 


 


  (Norco  5-325 Mg)  1 tab  Q4H  PRN


 PO  3/2/18 21:15


     


 


 


  (Morphine Inj)  2 mg  Q3H  PRN


 IV PUSH  3/2/18 21:15


     


 


 


  (Mariely-Colace)  1 tab  BID


 PO  3/3/18 09:00


    3/4/18 09:50


 


 


  (Milk Of


 Magnesia Liq)  30 ml  Q12H  PRN


 PO  3/2/18 21:15


     


 


 


  (Senokot)  17.2 mg  Q12H  PRN


 PO  3/2/18 21:15


     


 


 


  (Dulcolax Supp)  10 mg  DAILY  PRN


 RECTAL  3/2/18 21:15


     


 


 


  (Lactulose Liq)  30 ml  DAILY  PRN


 PO  3/2/18 21:15


     


 


 


  (Symbicort


 80-4.5 Mcg Inh)  1 puff  Q12HR


 INH  3/3/18 09:00


    3/4/18 21:38


 


 


  (Protonix)  40 mg  DAILY


 PO  3/3/18 09:00


    3/4/18 09:50


 


 


  (Pravachol)  40 mg  DAILY


 PO  3/3/18 09:00


    3/4/18 09:51


 


 


  (Duoneb Neb)  1 ampule  Q4HR NEB  PRN


 NEB  3/2/18 23:00


    3/3/18 19:49


 


 


 Miscellaneous


 Information  Patient in


 critical care


 unit?  Ass...  Q361D


 .XX  3/3/18 06:45


    3/3/18 06:45


 


 


  (Chlorhexidine


 2% Cloth)  3 pack  DAILY@04


 TOPICAL  3/4/18 04:00


 3/8/18 04:01   


 


 


  (Chlorhexidine


 2% Cloth)  3 pack  UNSCH  PRN


 TOPICAL  3/3/18 06:45


 3/8/18 06:42   


 


 


  (Aspirin)  325 mg  ON  CALL


 PO  3/3/18 11:45


 3/7/18 11:44   


 


 


  (Benadryl)  50 mg  ON  CALL


 PO  3/3/18 11:45


 3/7/18 11:44  3/3/18 15:18


 


 


  (NS Flush)  2 ml  UNSCH  PRN


 IV FLUSH  3/3/18 19:00


     


 


 


  (NS Flush)  2 ml  BID


 IV FLUSH  3/3/18 21:00


     


 


 


  (Tylenol)  325 mg  Q4H  PRN


 PO  3/3/18 19:00


     


 


 


  (Percocet  5-325


 Mg)  1 tab  Q4H  PRN


 PO  3/3/18 19:00


    3/3/18 20:06


 


 


  (Percocet 


 Mg)  1 tab  Q4H  PRN


 PO  3/3/18 19:00


     


 


 


  (Aspirin Chew)  81 mg  DAILY


 PO  3/4/18 09:00


    3/4/18 09:51


 


 


  (Plavix)  75 mg  DAILY


 PO  3/4/18 09:00


    3/4/18 09:50


 


 


  (Nitroglycerin


 2% Oint)  0.5 inch  Q6H


 TOPICAL  3/5/18 04:00


    3/5/18 04:48


 


 


  (Imdur)  30 mg  DAILY@07


 PO  3/5/18 07:00


    3/5/18 06:37


 


 


  (Diovan)  40 mg  BID


 PO  3/4/18 21:00


    3/4/18 21:37


 








Vital Signs / I&O





Vital Signs








  Date Time  Temp Pulse Resp B/P (MAP) Pulse Ox O2 Delivery O2 Flow Rate FiO2


 


3/5/18 04:50  66 16 136/82 (100) 95   


 


3/5/18 03:00  67      


 


3/5/18 02:00  62      


 


3/5/18 01:00  68      


 


3/5/18 00:00  60      


 


3/4/18 23:30  65 16 118/72 (87) 94   


 


3/4/18 23:00  63      


 


3/4/18 22:00  62      


 


3/4/18 21:00  58      


 


3/4/18 20:00  64      


 


3/4/18 19:25 97.3 67 20 132/79 (96) 98   


 


3/4/18 19:00  71      


 


3/4/18 18:00  64      


 


3/4/18 17:21     98 Nasal Cannula 2.00 


 


3/4/18 17:00  68      


 


3/4/18 16:00  62      


 


3/4/18 15:00  70      


 


3/4/18 15:00 97.9 60 18 155/87 (109) 98   


 


3/4/18 14:00  62      


 


3/4/18 13:57     98 Nasal Cannula 2.00 


 


3/4/18 13:00  62      


 


3/4/18 12:00  72      


 


3/4/18 11:00 97.8 58 18 140/78 (98) 98   


 


3/4/18 11:00  70      














I/O      


 


 3/4/18 3/4/18 3/4/18 3/5/18 3/5/18 3/5/18





 07:00 15:00 23:00 07:00 15:00 23:00


 


Intake Total 480 ml  620 ml 400 ml  


 


Output Total 1300 ml  1100 ml 1600 ml  


 


Balance -820 ml  -480 ml -1200 ml  


 


      


 


Intake Oral 480 ml  620 ml 400 ml  


 


Output Urine Total 1300 ml  1100 ml 1600 ml  


 


Stool Total 0 ml     


 


# Bowel Movements    0  








Physical Exam


GENERAL: Well developed, well nourished. No acute distress.


HEENT: Jugular venous pressure is normal. 


CHEST: Diminished breath sounds diffusely.


CARDIAC: Regular rate and rhythm without S3, S4, or murmur.


ABDOMEN: Soft, nontender, no hepatosplenomegaly. Bowel sounds present.


EXTREMITIES: No clubbing, cyanosis, or edema.


Laboratory





Laboratory Tests








Test


  3/5/18


06:35


 


White Blood Count 7.0 TH/MM3 


 


Red Blood Count 4.20 MIL/MM3 


 


Hemoglobin 11.1 GM/DL 


 


Hematocrit 33.6 % 


 


Mean Corpuscular Volume 80.0 FL 


 


Mean Corpuscular Hemoglobin 26.3 PG 


 


Mean Corpuscular Hemoglobin


Concent 32.9 % 


 


 


Red Cell Distribution Width 16.5 % 


 


Platelet Count 250 TH/MM3 


 


Mean Platelet Volume 6.9 FL 


 


Blood Urea Nitrogen 16 MG/DL 


 


Creatinine 1.32 MG/DL 


 


Random Glucose 94 MG/DL 


 


Calcium Level 8.5 MG/DL 


 


Sodium Level 142 MEQ/L 


 


Potassium Level 4.2 MEQ/L 


 


Chloride Level 109 MEQ/L 


 


Carbon Dioxide Level 23.4 MEQ/L 


 


Anion Gap 10 MEQ/L 


 


Estimat Glomerular Filtration


Rate 53 ML/MIN 


 








Imaging





Last 24 hours Impressions








Chest X-Ray 3/5/18 0600 Signed





Impressions: 





 Service Date/Time:  Monday, March 5, 2018 04:32 - CONCLUSION:  Mild airspace 





 opacity in the left lower lobe adjacent to the hiatal hernia. This could 





 represent atelectasis or airspace consolidation.     Alex Lechuga MD 











Assessment and Plan


Problem List:  


(1) CAD (coronary artery disease)


ICD Codes:  I25.10 - Atherosclerotic heart disease of native coronary artery 

without angina pectoris


Status:  Chronic


Plan:  Stable s/p stent native left circumflex over the weekend.  No further 

angina.  Groin stable.  OK to discharge home on his home medications, which 

includes Entresto 24-26 one bid, metoprolol succinate 25 mg qd, furosemide 20 

mg qd, aspirin 325 mg qd, plus clopidogrel 75 mg qd.  Follow up with me in 3 

weeks.





(2) Ischemic cardiomyopathy


ICD Codes:  I25.5 - Ischemic cardiomyopathy


Status:  Chronic


Plan:  EF 30-35% on echo earlier this month, improved compared to 20-25% in 11/ 2017.  Continue beta blocker, Entresto.  Compensated at present.





Code Status


full code


Discussed Condition With


patient





Problem Qualifiers





(1) CAD (coronary artery disease):  


Qualified Codes:  I25.110 - Atherosclerotic heart disease of native coronary 

artery with unstable angina pectoris








Flo Martinez MD Mar 5, 2018 10:28

## 2018-03-05 NOTE — HHI.PR
Subjective


Remarks


f/u for NSTEMI


patient seen around 10:20 am.  At that time patient had no complaints.  He 

denied any CP, SOB, palpitations lightheadedness or dizziness.  He denied any 

suicidal or homicidal ideations.  He stated that he feels anxious because of 

his heart disease and does not know when he will die but is greatly for his 

life.  Psychiatrist Dr. Palomo consulted and cleared patient for discharge.  

He stated patient did not met inpatient pysch criteria and patient refused any 

treatment for is depression.


Patient was discharge and patient;s nurse Nico told him this.  Patient then 

stated he has thoughts of suicidal and will find a way to harm herself. 


Baker Act placed again since I am unsure if patient will act on these thought.


discharged order cancelled.





Objective


Vitals





Vital Signs








  Date Time  Temp Pulse Resp B/P (MAP) Pulse Ox O2 Delivery O2 Flow Rate FiO2


 


3/5/18 16:00  58      


 


3/5/18 15:00  62      


 


3/5/18 15:00  62 18 151/94 (113) 98   


 


3/5/18 14:00  64      


 


3/5/18 13:00  66      


 


3/5/18 12:00  64      


 


3/5/18 11:00  70      


 


3/5/18 11:00 97.8 68 18 138/66 (90) 94   


 


3/5/18 10:00  66      


 


3/5/18 09:00  72      


 


3/5/18 08:00  64      


 


3/5/18 07:30 97.6 68 18 124/62 (82) 93   


 


3/5/18 07:00  64      


 


3/5/18 04:50  66 16 136/82 (100) 95   


 


3/5/18 03:00  67      


 


3/5/18 02:00  62      


 


3/5/18 01:00  68      


 


3/5/18 00:00  60      


 


3/4/18 23:30  65 16 118/72 (87) 94   


 


3/4/18 23:00  63      


 


3/4/18 22:00  62      


 


3/4/18 21:00  58      


 


3/4/18 20:00  64      


 


3/4/18 19:25 97.3 67 20 132/79 (96) 98   


 


3/4/18 19:00  71      














I/O      


 


 3/4/18 3/4/18 3/4/18 3/5/18 3/5/18 3/5/18





 07:00 15:00 23:00 07:00 15:00 23:00


 


Intake Total 480 ml  620 ml 400 ml  


 


Output Total 1300 ml  1100 ml 1600 ml  


 


Balance -820 ml  -480 ml -1200 ml  


 


      


 


Intake Oral 480 ml  620 ml 400 ml  


 


Output Urine Total 1300 ml  1100 ml 1600 ml  


 


Stool Total 0 ml     


 


# Bowel Movements    0  








Result Diagram:  


3/5/18 0635                                                                    

            3/5/18 0635





Objective Remarks


GENERAL: in NAD


CARDIOVASCULAR: Regular rate and rhythm without murmurs, gallops, or rubs. 


RESPIRATORY: Breath sounds equal bilaterally. No accessory muscle use.


GASTROINTESTINAL: Abdomen soft, non-tender, nondistended. 


MUSCULOSKELETAL: No cyanosis, or edema. 


BACK: Nontender without obvious deformity. No CVA tenderness.





Medications and IVs





Current Medications


Nitroglycerin (Nitroglycerin 2% Oint) 1 inch ONCE  ONCE TOP ;  Start 3/2/18 at 

19:45;  Stop 3/2/18 at 20:25;  Status DC


Sodium Chloride (NS Flush) 2 ml UNSCH  PRN IVF FLUSH AFTER USING IV ACCESS;  

Start 3/2/18 at 19:45;  Stop 3/2/18 at 21:19;  Status DC


Heparin Sodium (Porcine) (Heparin Inj) 4,000 units ONCE  ONCE IV  Last 

administered on 3/2/18at 21:18;  Start 3/2/18 at 21:00;  Stop 3/2/18 at 21:01;  

Status DC


Heparin Sodium (Porcine) (Heparin Inj) 5,000 units UNSCH  PRN IV APTT LESS THAN 

25;  Start 3/3/18 at 03:00


Heparin Sodium (Porcine) (Heparin Inj) 2,500 units UNSCH  PRN IV APTT 25 TO 39;

  Start 3/3/18 at 03:00


Heparin Sodium/ Dextrose 250 ml @  10.8 mls/hr TITRATE  PRN IV Coagulation 

Management Last administered on 3/2/18at 21:20;  Start 3/2/18 at 21:00


Sodium Chloride 1,000 ml @  100 mls/hr Q10H IV  Last administered on 3/2/18at 22

:12;  Start 3/2/18 at 21:13;  Stop 3/4/18 at 10:41;  Status DC


Sodium Chloride (NS Flush) 2 ml UNSCH  PRN IV FLUSH FLUSH AFTER USING IV ACCESS

;  Start 3/2/18 at 21:15


Sodium Chloride (NS Flush) 2 ml BID IV FLUSH  Last administered on 3/5/18at 09:

00;  Start 3/3/18 at 09:00


Ondansetron HCl (Zofran Inj) 4 mg Q6H  PRN IVP NAUSEA OR VOMITING;  Start 3/2/

18 at 21:15


Acetaminophen (Tylenol) 650 mg Q6H  PRN PO FEVER/PAIN SCALE 1 TO 2;  Start 3/2/

18 at 21:15


Acetaminophen/ Hydrocodone Bitart (Norco  5-325 Mg) 1 tab Q4H  PRN PO PAIN 

SCALE 3 TO 5;  Start 3/2/18 at 21:15


Morphine Sulfate (Morphine Inj) 2 mg Q3H  PRN IV PUSH Pain 6-10;  Start 3/2/18 

at 21:15


Senna/Docusate Sodium (Mariely-Colace) 1 tab BID PO  Last administered on 3/5/18at 

10:37;  Start 3/3/18 at 09:00


Magnesium Hydroxide (Milk Of Magnesia Liq) 30 ml Q12H  PRN PO Mild constipation

;  Start 3/2/18 at 21:15


Sennosides (Senokot) 17.2 mg Q12H  PRN PO Moderate constipation;  Start 3/2/18 

at 21:15


Bisacodyl (Dulcolax Supp) 10 mg DAILY  PRN RECTAL SEVERE CONSITIPATION;  Start 3

/2/18 at 21:15


Lactulose (Lactulose Liq) 30 ml DAILY  PRN PO SEVERE CONSITIPATION;  Start 3/2/

18 at 21:15


Nitroglycerin (Nitroglycerin 2% Oint) 0.5 inch Q6HR  PRN TOPICAL CHEST PAIN;  

Start 3/2/18 at 21:15;  Stop 3/4/18 at 13:58;  Status DC


Aspirin (Aspirin) 325 mg DAILY PO  Last administered on 3/3/18at 08:43;  Start 3

/3/18 at 09:00;  Stop 3/3/18 at 18:51;  Status DC


Budesonide/ Formoterol Fumarate (Symbicort 80-4.5 Mcg Inh) 1 puff Q12HR INH  

Last administered on 3/5/18at 09:00;  Start 3/3/18 at 09:00


Metoprolol Tartrate (Lopressor) 25 mg DAILY PO ;  Start 3/3/18 at 09:00;  Stop 3

/3/18 at 13:16;  Status DC


Pantoprazole Sodium (Protonix) 40 mg DAILY PO  Last administered on 3/5/18at 10:

38;  Start 3/3/18 at 09:00


Pravastatin Sodium (Pravachol) 40 mg DAILY PO  Last administered on 3/5/18at 10:

38;  Start 3/3/18 at 09:00


Albuterol/ Ipratropium (Duoneb Neb) 1 ampule Q4HR NEB  PRN NEB SOB/WHEEZING 

Last administered on 3/3/18at 19:49;  Start 3/2/18 at 23:00


Miscellaneous Information Patient in critical care unit?  Ass... [id number] .XX  

Last administered on 3/3/18at 06:45;  Start 3/3/18 at 06:45


Chlorhexidine Gluconate (Chlorhexidine 2% Cloth) 3 pack DAILY@04 TOPICAL ;  

Start 3/4/18 at 04:00;  Stop 3/8/18 at 04:01


Chlorhexidine Gluconate (Chlorhexidine 2% Cloth) 3 pack UNSCH  PRN TOPICAL 

HYGIENIC CARE;  Start 3/3/18 at 06:45;  Stop 3/8/18 at 06:42


Aspirin (Aspirin) 325 mg ON  CALL PO ;  Start 3/3/18 at 11:45;  Stop 3/7/18 at 

11:44


Diphenhydramine HCl (Benadryl) 50 mg ON  CALL PO  Last administered on 3/3/18at 

15:18;  Start 3/3/18 at 11:45;  Stop 3/7/18 at 11:44


Carvedilol (Coreg) 3.125 mg Q12HR PO  Last administered on 3/4/18at 09:51;  

Start 3/3/18 at 21:00;  Stop 3/4/18 at 13:54;  Status DC


Nitroglycerin (Nitroglycerin 2% Oint) 1 inch Q6HR TOPICAL  Last administered on 

3/4/18at 05:15;  Start 3/3/18 at 14:15;  Stop 3/4/18 at 13:54;  Status DC


Heparin Sodium/ Sodium Chloride 2,000 ml @  As Directed STK-MED ONCE IV FLUSH  

Last administered on 3/3/18at 15:31;  Start 3/3/18 at 15:31;  Stop 3/3/18 at 15:

32;  Status DC


Midazolam HCl (Versed Inj) 2 mg STK-MED ONCE .ROUTE  Last administered on 3/3/

18at 16:20;  Start 3/3/18 at 15:50;  Stop 3/3/18 at 15:51;  Status DC


Bivalirudin (Angiomax Inj) 250 mg STK-MED ONCE .ROUTE  Last administered on 3/3/

18at 16:24;  Start 3/3/18 at 16:24;  Stop 3/3/18 at 16:25;  Status DC


Clopidogrel Bisulfate (Plavix) 600 mg STK-MED ONCE .ROUTE ;  Start 3/3/18 at 16:

53;  Stop 3/3/18 at 16:54;  Status DC


Sodium Chloride 1,000 ml @  100 mls/hr Q10H IV  Last administered on 3/4/18at 05

:15;  Start 3/3/18 at 20:00;  Stop 3/4/18 at 19:35;  Status DC


Sodium Chloride (NS Flush) 2 ml UNSCH  PRN IV FLUSH FLUSH AFTER USING IV ACCESS

;  Start 3/3/18 at 19:00


Sodium Chloride (NS Flush) 2 ml BID IV FLUSH  Last administered on 3/5/18at 09:

00;  Start 3/3/18 at 21:00


Acetaminophen (Tylenol) 325 mg Q4H  PRN PO PAIN SCALE 1 TO 2;  Start 3/3/18 at 

19:00


Oxycodone/ Acetaminophen (Percocet  5-325 Mg) 1 tab Q4H  PRN PO PAIN SCALE 3 TO 

5 Last administered on 3/3/18at 20:06;  Start 3/3/18 at 19:00


Oxycodone/ Acetaminophen (Percocet  Mg) 1 tab Q4H  PRN PO PAIN SCALE 6 TO 

10;  Start 3/3/18 at 19:00


Aspirin (Aspirin Chew) 81 mg DAILY PO  Last administered on 3/4/18at 09:51;  

Start 3/4/18 at 09:00;  Stop 3/5/18 at 10:31;  Status DC


Clopidogrel Bisulfate (Plavix) 75 mg DAILY PO  Last administered on 3/5/18at 10:

39;  Start 3/4/18 at 09:00


Miscellaneous Information 1 ONCE  ONCE XX ;  Start 3/3/18 at 19:00;  Stop 3/3/

18 at 19:01;  Status DC


Nitroglycerin (Nitroglycerin 2% Oint) 0.5 inch Q6H TOPICAL  Last administered 

on 3/5/18at 04:48;  Start 3/5/18 at 04:00;  Stop 3/5/18 at 10:30;  Status DC


Isosorbide Mononitrate (Imdur) 30 mg DAILY@07 PO  Last administered on 3/5/18at 

06:37;  Start 3/5/18 at 07:00;  Stop 3/5/18 at 10:30;  Status DC


Valsartan (Diovan) 40 mg BID PO  Last administered on 3/4/18at 21:37;  Start 3/4

/18 at 21:00;  Stop 3/5/18 at 10:31;  Status DC


Iohexol (OMNIPAQUE 350 INJ (Cath Lab)) 100 ml STK-MED ONCE OTHER ;  Start 3/5/

18 at 08:26;  Stop 3/5/18 at 08:27;  Status DC


Iohexol (OMNIPAQUE 350 INJ (Cath Lab)) 50 ml STK-MED ONCE OTHER ;  Start 3/5/18 

at 08:26;  Stop 3/5/18 at 08:27;  Status DC


Aspirin (Aspirin) 325 mg DAILY PO ;  Start 3/6/18 at 09:00


Sacubitril/ Valsartan (Entresto 24-26 Mg) 1 tab BID PO ;  Start 3/5/18 at 21:00


Metoprolol Succinate (Toprol Xl) 25 mg DAILY PO ;  Start 3/6/18 at 09:00





A/P


Problem List:  


(1) NSTEMI (non-ST elevated myocardial infarction)


ICD Code:  I21.4 - Non-ST elevation (NSTEMI) myocardial infarction


(2) HONORIO (acute kidney injury)


ICD Code:  N17.9 - Acute kidney failure, unspecified


(3) COPD (chronic obstructive pulmonary disease)


ICD Code:  J44.9 - Chronic obstructive pulmonary disease, unspecified


(4) Depression


ICD Code:  F32.9 - Major depressive disorder, single episode, unspecified


Assessment and Plan


This is a 79 y/o who p/w chest pain


- s/p stent native left circumflex. asymptomatic.  per cardiologist d/c on 

Entresto 24-26 one bid, metoprolol succinate 25 mg qd, furosemide 20 mg qd, 

aspirin 325 mg qd, plus clopidogrel 75 mg qd.  Follow up in 3 weeks with Dr. Martinez.





HONORIO:  Creatinine 1.62, previously 1.19 on 2/11/18.


-most likely pre-renal due to NSTEMI. resolved with fluids.





COPD:


-  Chronic Respiratory Failure, continue home Symbicort, DuoNeb prn. 





Depression:  


-psychiatrist evaluated patient and he refused to be started on treated and did 

not met inpatient psychiatry criteria.  when patient found out he was going to 

be discharge he told nurse he had suicidal thoughts.  will BAKER ACT until 

psych re-evaluate patients. 





DVT Prophylaxis: Bilateral SCDs.





Discharge Planning


discharge ordered was placed to SNF but patient now stated he is suicidal.  d/c 

discharge order.


BAKER ACT. suicidal precautions. sitter. nurse to notify Dr. Palomo.


discussed case with patient's nurse Nico.











Mitzi Marshall MD Mar 5, 2018 18:59

## 2018-03-05 NOTE — HHI.DCPOC
Discharge Care Plan


Diagnosis:  


(1) CAD (coronary artery disease)


(2) Ischemic cardiomyopathy


(3) Adjustment disorder with depressed mood


Goals to Promote Your Health


* To prevent worsening of your condition and complications


* To maintain your health at the optimal level


Directions to Meet Your Goals


*** Take your medications as prescribed


*** Follow your dietary instruction


*** Follow activity as directed








*** Keep your appointments as scheduled


*** Take your immunizations and boosters as scheduled


*** If your symptoms worsen call your PCP, if no PCP go to Urgent Care Center 

or Emergency Room***


*** Smoking is Dangerous to Your Health. Avoid second hand smoke***


***Call the 24-hour hour crisis hotline for domestic abuse at [phone]***











Mitzi Marshall MD Mar 5, 2018 15:48
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
## 2018-03-05 NOTE — RADRPT
EXAM DATE/TIME:  03/05/2018 04:32 

 

[facility] COMPARISON:     

CHEST PA & LAT, March 02, 2018, 20:07.  CHEST SINGLE AP, February 10, 2018, 18:46.

 

                     

INDICATIONS :     

Shortness of breath, possible pulmonary.

                     

 

MEDICAL HISTORY :     

Chronic obstructive pulmonary disease.  Congestive heart failure.  Myocardial infarction.      

 

SURGICAL HISTORY :     

CABG. Coronary artery stent.  

 

ENCOUNTER:     

Subsequent                                        

 

ACUITY:     

3 days      

 

PAIN SCORE:     

0/10

 

LOCATION:     

Bilateral chest 

 

FINDINGS:     

Portable AP view of the chest demonstrates a normal-sized cardiac silhouette in this patient post med
janice sternotomy and CABG. The most superior sternotomy wire remains fractured. There is left lower lob
e airspace opacity adjacent to a hiatal hernia. No pleural effusion or pneumothorax is identified. Th
e bones and soft tissues demonstrate no acute finding.

 

CONCLUSION:     

Mild airspace opacity in the left lower lobe adjacent to the hiatal hernia. This could represent atel
ectasis or airspace consolidation.

 

 

 

 Alex Lechuga MD on March 05, 2018 at 5:16           

Board Certified Radiologist.

 This report was verified electronically.
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
## 2018-03-06 VITALS
OXYGEN SATURATION: 96 % | SYSTOLIC BLOOD PRESSURE: 182 MMHG | RESPIRATION RATE: 18 BRPM | HEART RATE: 65 BPM | DIASTOLIC BLOOD PRESSURE: 93 MMHG | TEMPERATURE: 97.6 F

## 2018-03-06 VITALS
HEART RATE: 67 BPM | DIASTOLIC BLOOD PRESSURE: 88 MMHG | OXYGEN SATURATION: 98 % | RESPIRATION RATE: 19 BRPM | TEMPERATURE: 97.4 F | SYSTOLIC BLOOD PRESSURE: 149 MMHG

## 2018-03-06 VITALS
TEMPERATURE: 98 F | RESPIRATION RATE: 18 BRPM | SYSTOLIC BLOOD PRESSURE: 156 MMHG | HEART RATE: 63 BPM | DIASTOLIC BLOOD PRESSURE: 87 MMHG

## 2018-03-06 LAB
BUN SERPL-MCNC: 20 MG/DL (ref 7–18)
CALCIUM SERPL-MCNC: 9.7 MG/DL (ref 8.5–10.1)
CHLORIDE SERPL-SCNC: 108 MEQ/L (ref 98–107)
CREAT SERPL-MCNC: 1.06 MG/DL (ref 0.6–1.3)
GFR SERPLBLD BASED ON 1.73 SQ M-ARVRAT: 68 ML/MIN (ref 89–?)
GLUCOSE SERPL-MCNC: 151 MG/DL (ref 74–106)
HCO3 BLD-SCNC: 23.8 MEQ/L (ref 21–32)
MAGNESIUM SERPL-MCNC: 2.4 MG/DL (ref 1.5–2.5)
SODIUM SERPL-SCNC: 139 MEQ/L (ref 136–145)

## 2018-03-06 RX ADMIN — METOPROLOL SUCCINATE SCH MG: 25 TABLET, EXTENDED RELEASE ORAL at 12:00

## 2018-03-06 RX ADMIN — FUROSEMIDE SCH MG: 20 TABLET ORAL at 12:00

## 2018-03-06 RX ADMIN — IPRATROPIUM BROMIDE AND ALBUTEROL SULFATE SCH AMPULE: .5; 3 SOLUTION RESPIRATORY (INHALATION) at 15:05

## 2018-03-06 RX ADMIN — BUDESONIDE AND FORMOTEROL FUMARATE DIHYDRATE SCH PUFF: 80; 4.5 AEROSOL RESPIRATORY (INHALATION) at 12:00

## 2018-03-06 RX ADMIN — ESCITALOPRAM OXALATE SCH MG: 10 TABLET, FILM COATED ORAL at 12:30

## 2018-03-06 RX ADMIN — SACUBITRIL AND VALSARTAN SCH TAB: 24; 26 TABLET, FILM COATED ORAL at 12:30

## 2018-03-06 RX ADMIN — IPRATROPIUM BROMIDE AND ALBUTEROL SULFATE SCH AMPULE: .5; 3 SOLUTION RESPIRATORY (INHALATION) at 12:30

## 2018-03-06 RX ADMIN — CLOPIDOGREL BISULFATE SCH MG: 75 TABLET, FILM COATED ORAL at 12:00

## 2018-03-06 RX ADMIN — SACUBITRIL AND VALSARTAN SCH TAB: 24; 26 TABLET, FILM COATED ORAL at 21:00

## 2018-03-06 RX ADMIN — SACUBITRIL AND VALSARTAN SCH TAB: 24; 26 TABLET, FILM COATED ORAL at 22:13

## 2018-03-06 RX ADMIN — PRAVASTATIN SODIUM SCH MG: 40 TABLET ORAL at 12:30

## 2018-03-06 RX ADMIN — BUDESONIDE AND FORMOTEROL FUMARATE DIHYDRATE SCH PUFF: 80; 4.5 AEROSOL RESPIRATORY (INHALATION) at 21:00

## 2018-03-06 RX ADMIN — PANTOPRAZOLE SCH MG: 40 TABLET, DELAYED RELEASE ORAL at 12:30

## 2018-03-06 RX ADMIN — IPRATROPIUM BROMIDE AND ALBUTEROL SULFATE SCH AMPULE: .5; 3 SOLUTION RESPIRATORY (INHALATION) at 22:00
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
## 2018-03-06 NOTE — PD.CONS
HPI


Service


[facility] Hospitalists


Consult Requested By


Dr. Gatica


Reason for Consult


Medical management


Primary Care Physician


Alan Mcclure M.D.


Diagnoses:  


History of Present Illness


This is a 78-year-old male who was recently admitted due to NSTEMI.  During his 

hospitalization at [facility] in [place] patient was also dealing with 

depression due to his home situation.  He expressed suicidal ideations so was 

Pacheco act and transfer to psychiatry since patient was stable medically.  

Patient had no complaints to me.  He stated that he felt like if he went home 

he would be in danger.  He denies any chest pain, shortness of breathing, 

palpitation, lightheadedness dizziness.


All other review of system reviewed and negative.





Past Family Social History


Allergies:  


Coded Allergies:  


     No Known Allergies (Verified  Allergy, Unknown, 11/16/17)


Past Medical History


HTN, CAD s/p CABD and Cardiac Stents, h/o GI Bleed, Anxiety, Depression, 

Hyperlipidemia and COPD


Past Surgical History


Hernia Repair, CABG, Cardiac Stent, Right CEA, Tonsillectomy


Reported Medications





Reported Meds & Active Scripts


Active


Metoprolol Succinate ER 24 HR (Metoprolol Succinate) 25 Mg Tab 25 Mg PO DAILY


Plavix (Clopidogrel Bisulfate) 75 Mg Tab 75 Mg PO DAILY


Symbicort Inh (Budesonide/Formoterol Fumarate) 80-4.5 Mcg/Act Aero 1 Puff INH 

Q12HR


[Albuterol-Ipratropium Neb] 1 AMPULE Nebu 1 Ampule INH Q6HR NEB


Furosemide 20 Mg Tab 20 Mg PO DAILY


Entresto (Sacubitril-Valsartan) 24-26 Mg Tab 1 Tab PO BID


Reported


Pantoprazole (Pantoprazole Sodium) 40 Mg Tab 40 Mg PO DAILY


Simvastatin 20 Mg Tab 20 Mg PO DAILY


Aspirin 325 Mg Tab 325 Mg PO DAILY


Active Ordered Medications





Current Medications


Acetaminophen (Tylenol) 650 mg Q4H  PRN PO Pain 1-5 or Temp >101F;  Start 3/6/

18 at 01:00


Magnesium Hydroxide (Milk Of Magnesia Liq) 30 ml DAILY  PRN PO CONSTIPATION;  

Start 3/6/18 at 01:00


Al Hydrox/Mg Hydrox/Simethicone (Mag-Al Plus Susp Liq) 30 ml Q6H  PRN PO 

DYSPEPSIA;  Start 3/6/18 at 01:00


Nicotine (Habitrol 21 Mg Patch.24 Hr) 1 patch DAILY  PRN T-DERMAL NICOTINE 

CRAVINGS;  Start 3/6/18 at 01:00


Miscellaneous Information 1 HS T-DERMAL ;  Start 3/6/18 at 21:00


Hydroxyzine HCl (Atarax) 50 mg Q6H  PRN PO ANXIETY;  Start 3/6/18 at 10:15


Aspirin (Aspirin) 325 mg DAILY PO ;  Start 3/7/18 at 09:00


Budesonide/ Formoterol Fumarate (Symbicort 80-4.5 Mcg Inh) 1 puff Q12HR INH  

Last administered on 3/6/18at 12:00;  Start 3/6/18 at 12:00


Clopidogrel Bisulfate (Plavix) 75 mg DAILY PO  Last administered on 3/6/18at 12:

00;  Start 3/6/18 at 12:00


Furosemide (Lasix) 20 mg DAILY PO  Last administered on 3/6/18at 12:00;  Start 3

/6/18 at 12:00


Metoprolol Succinate (Toprol Xl) 25 mg DAILY PO  Last administered on 3/6/18at 

12:00;  Start 3/6/18 at 12:00


Pantoprazole Sodium (Protonix) 40 mg DAILY PO  Last administered on 3/6/18at 12:

30;  Start 3/6/18 at 12:30


Sacubitril/ Valsartan (Entresto 24-26 Mg) 1 tab BID PO  Last administered on 3/6

/18at 12:30;  Start 3/6/18 at 12:30


Pravastatin Sodium (Pravachol) 40 mg DAILY PO  Last administered on 3/6/18at 12:

30;  Start 3/6/18 at 12:30


Albuterol/ Ipratropium (Duoneb Neb) 1 ampule Q6HR  NEB NEB  Last administered 

on 3/6/18at 15:05;  Start 3/6/18 at 12:30


Escitalopram Oxalate (Lexapro) 10 mg DAILY PO  Last administered on 3/6/18at 12:

30;  Start 3/6/18 at 12:30


Family History





Reviewed.  No h/o DM or CAD


Social History





  History of alcohol abuse, quit 40yrs ago.  Negative for tobacco or drugs.





Physical Exam


Vital Signs





Vital Signs








  Date Time  Temp Pulse Resp B/P (MAP) Pulse Ox O2 Delivery O2 Flow Rate FiO2


 


3/6/18 06:07 98.0 63 18 156/87 (110)    


 


3/6/18 00:00 97.6 65 18 182/93 (122) 96   








Physical Exam


GENERAL: This is a well-nourished, well-developed patient, in no apparent 

distress.


SKIN: No rashes, ecchymoses or lesions. Cool and dry.


HEAD: Atraumatic. Normocephalic. No temporal or scalp tenderness.


EYES: Pupils equal round and reactive. Extraocular motions intact. No scleral 

icterus. No injection or drainage. 


ENT: Nose without bleeding, purulent drainage or septal hematoma. Throat 

without erythema, tonsillar hypertrophy or exudate. Uvula midline. Airway 

patent.


NECK: Trachea midline. No JVD or lymphadenopathy. Supple, nontender, no 

meningeal signs.


CARDIOVASCULAR: Regular rate and rhythm without murmurs, gallops, or rubs. 


RESPIRATORY: Clear to auscultation. Breath sounds equal bilaterally. No wheezes

, rales, or rhonchi.  


GASTROINTESTINAL: Abdomen soft, non-tender, nondistended. No hepato-splenomegaly

, or palpable masses. No guarding.


MUSCULOSKELETAL: Extremities without clubbing, cyanosis, or edema. No joint 

tenderness, effusion, or edema noted. No calf tenderness. Negative Homans sign 

bilaterally.


NEUROLOGICAL: Awake and alert. Cranial nerves II through XII intact.  Motor and 

sensory grossly within normal limits. Five out of 5 muscle strength in all 

muscle groups.  Normal speech.





Assessment and Plan


Assessment and Plan


This is a 77 y/o was recently admitted at [facility] in [place] due 

to NSTEMI and discharged to inpatient psychiatry secondary to depression and 

suicidal ideations





Depression/suicidal ideation


-Management per inpatient psychiatrist.  Patient was started on Lexapro.





NSTEMI


- s/p stent native left circumflex. asymptomatic.  


-Continue with Entresto 24-26 one bid, metoprolol succinate 25 mg qd, 

furosemide 20 mg qd, aspirin 325 mg qd, plus clopidogrel 75 mg qd.  Follow up 

in 3 weeks with Dr. Martinez.





COPD:


-  Chronic Respiratory Failure, continue home Symbicort, DuoNeb prn. 





DVT Prophylaxis: Bilateral SCDs.  Encourage ambulation.


Code Status


DNR


Discussed Condition With


Patient











Mitzi Marshall MD Mar 6, 2018 15:58

## 2018-03-07 VITALS
SYSTOLIC BLOOD PRESSURE: 141 MMHG | OXYGEN SATURATION: 96 % | TEMPERATURE: 98.2 F | DIASTOLIC BLOOD PRESSURE: 76 MMHG | RESPIRATION RATE: 18 BRPM | HEART RATE: 61 BPM

## 2018-03-07 VITALS
HEART RATE: 61 BPM | SYSTOLIC BLOOD PRESSURE: 115 MMHG | OXYGEN SATURATION: 98 % | TEMPERATURE: 98.6 F | RESPIRATION RATE: 18 BRPM | DIASTOLIC BLOOD PRESSURE: 63 MMHG

## 2018-03-07 VITALS
TEMPERATURE: 98.2 F | HEART RATE: 61 BPM | RESPIRATION RATE: 18 BRPM | OXYGEN SATURATION: 96 % | DIASTOLIC BLOOD PRESSURE: 76 MMHG | SYSTOLIC BLOOD PRESSURE: 141 MMHG

## 2018-03-07 LAB
BUN SERPL-MCNC: 17 MG/DL (ref 7–18)
CALCIUM SERPL-MCNC: 8.9 MG/DL (ref 8.5–10.1)
CHLORIDE SERPL-SCNC: 106 MEQ/L (ref 98–107)
CHOLEST SERPL-MCNC: 167 MG/DL (ref 120–200)
CHOLESTEROL/ HDL RATIO: 3.91 RATIO
CREAT SERPL-MCNC: 1.32 MG/DL (ref 0.6–1.3)
GFR SERPLBLD BASED ON 1.73 SQ M-ARVRAT: 53 ML/MIN (ref 89–?)
GLUCOSE SERPL-MCNC: 91 MG/DL (ref 74–106)
HBA1C MFR BLD: 6.3 % (ref 4.3–6)
HCO3 BLD-SCNC: 22.6 MEQ/L (ref 21–32)
HDLC SERPL-MCNC: 42.7 MG/DL (ref 40–60)
LDLC SERPL-MCNC: 103 MG/DL (ref 0–99)
SODIUM SERPL-SCNC: 138 MEQ/L (ref 136–145)
TRIGL SERPL-MCNC: 109 MG/DL (ref 42–150)

## 2018-03-07 RX ADMIN — IPRATROPIUM BROMIDE AND ALBUTEROL SULFATE SCH AMPULE: .5; 3 SOLUTION RESPIRATORY (INHALATION) at 04:00

## 2018-03-07 RX ADMIN — OXYCODONE HYDROCHLORIDE AND ACETAMINOPHEN SCH MG: 500 TABLET ORAL at 20:25

## 2018-03-07 RX ADMIN — PRAVASTATIN SODIUM SCH MG: 40 TABLET ORAL at 08:06

## 2018-03-07 RX ADMIN — ASPIRIN SCH MG: 325 TABLET ORAL at 08:06

## 2018-03-07 RX ADMIN — BUDESONIDE AND FORMOTEROL FUMARATE DIHYDRATE SCH PUFF: 80; 4.5 AEROSOL RESPIRATORY (INHALATION) at 11:49

## 2018-03-07 RX ADMIN — SACUBITRIL AND VALSARTAN SCH TAB: 24; 26 TABLET, FILM COATED ORAL at 11:49

## 2018-03-07 RX ADMIN — IPRATROPIUM BROMIDE AND ALBUTEROL SULFATE SCH AMPULE: .5; 3 SOLUTION RESPIRATORY (INHALATION) at 15:23

## 2018-03-07 RX ADMIN — PANTOPRAZOLE SCH MG: 40 TABLET, DELAYED RELEASE ORAL at 08:07

## 2018-03-07 RX ADMIN — IPRATROPIUM BROMIDE AND ALBUTEROL SULFATE SCH AMPULE: .5; 3 SOLUTION RESPIRATORY (INHALATION) at 21:01

## 2018-03-07 RX ADMIN — METOPROLOL SUCCINATE SCH MG: 25 TABLET, EXTENDED RELEASE ORAL at 08:06

## 2018-03-07 RX ADMIN — FERROUS SULFATE TAB 325 MG (65 MG ELEMENTAL FE) SCH MG: 325 (65 FE) TAB at 20:25

## 2018-03-07 RX ADMIN — ESCITALOPRAM OXALATE SCH MG: 10 TABLET, FILM COATED ORAL at 08:06

## 2018-03-07 RX ADMIN — IPRATROPIUM BROMIDE AND ALBUTEROL SULFATE SCH AMPULE: .5; 3 SOLUTION RESPIRATORY (INHALATION) at 10:00

## 2018-03-07 RX ADMIN — CLOPIDOGREL BISULFATE SCH MG: 75 TABLET, FILM COATED ORAL at 08:06

## 2018-03-07 RX ADMIN — FUROSEMIDE SCH MG: 20 TABLET ORAL at 08:07

## 2018-03-07 RX ADMIN — BUDESONIDE AND FORMOTEROL FUMARATE DIHYDRATE SCH PUFF: 80; 4.5 AEROSOL RESPIRATORY (INHALATION) at 20:26

## 2018-03-07 NOTE — HHI.PR
Subjective


Remarks


Follow-up on patient with recent NSTEMI, COPD.  Patient seen and examined.  

Patient complaints of 2 episodes this morning of sharp left-sided nonradicular 

chest pain that occurred at rest.  He denies any associated shortness of breath

, diaphoresis, dizziness, nausea or vomiting.  States chest pain resolved on 

its own within a few minutes.  He is been up and ambulating around the unit 

without any recurrence of chest pain.  He denies any fever or chills.  Reports 

chronic shortness of breath that is unchanged.





Objective


Vitals





Vital Signs








  Date Time  Temp Pulse Resp B/P (MAP) Pulse Ox O2 Delivery O2 Flow Rate FiO2


 


3/7/18 06:00 98.2 61 18 141/76 (97) 96   


 


3/7/18 05:43 98.2 61 18 141/76 (97) 96   


 


3/6/18 17:36 97.4 67 19 149/88 (108) 98   














I/O      


 


 3/6/18 3/6/18 3/6/18 3/7/18 3/7/18 3/7/18





 07:00 15:00 23:00 07:00 15:00 23:00


 


Intake Total   240 ml  1420 ml 


 


Balance   240 ml  1420 ml 


 


      


 


Intake Oral   240 ml  1420 ml 


 


# Voids 3  2 3 2 








Result Diagram:  


3/7/18 0610





Objective Remarks


GENERAL: This is a well-nourished, well-developed elderly male patient, in no 

apparent distress.  Awake and alert.  Witnessed ambulating in the unit with the 

assistance of walker.


SKIN: Cool and dry.


HEAD: Atraumatic. Normocephalic. 


EYES: Extraocular motions intact. No scleral icterus. No injection or drainage. 


ENT: Nose without bleeding. Airway patent.  MMM.


NECK: Trachea midline. 


CARDIOVASCULAR: Regular rate and rhythm without murmurs, gallops, or rubs. 


RESPIRATORY: Clear to auscultation. Breath sounds equal bilaterally. No wheezes

, rales, or rhonchi.  


GASTROINTESTINAL: Abdomen soft, non-tender, nondistended. 


MUSCULOSKELETAL: Extremities without clubbing or cyanosis.  Trace bilateral 

lower extremity edema.


NEUROLOGICAL: Awake and alert.  Able to move all extremities spontaneously.  No 

focal neurologic findings appreciated.  Normal speech.


PSYCHIATRIC: Appears to have appropriate insight and judgment.  Calm and 

pleasant.


Medications and IVs





Current Medications








 Medications


  (Trade)  Dose


 Ordered  Sig/Armen


 Route  Start Time


 Stop Time Status Last Admin


 


  (Tylenol)  650 mg  Q4H  PRN


 PO  3/6/18 01:00


     


 


 


  (Milk Of


 Magnesia Liq)  30 ml  DAILY  PRN


 PO  3/6/18 01:00


     


 


 


  (Mag-Al Plus


 Susp Liq)  30 ml  Q6H  PRN


 PO  3/6/18 01:00


     


 


 


  (Habitrol 21 Mg


 Patch.24 Hr)  1 patch  DAILY  PRN


 T-DERMAL  3/6/18 01:00


     


 


 


 Miscellaneous


 Information  1  HS


 T-DERMAL  3/6/18 21:00


     


 


 


  (Atarax)  50 mg  Q6H  PRN


 PO  3/6/18 10:15


     


 


 


  (Aspirin)  325 mg  DAILY


 PO  3/7/18 09:00


    3/7/18 08:06


 


 


  (Symbicort


 80-4.5 Mcg Inh)  1 puff  Q12HR


 INH  3/6/18 12:00


    3/7/18 11:49


 


 


  (Plavix)  75 mg  DAILY


 PO  3/6/18 12:00


    3/7/18 08:06


 


 


  (Lasix)  20 mg  DAILY


 PO  3/6/18 12:00


    3/7/18 08:07


 


 


  (Toprol Xl)  25 mg  DAILY


 PO  3/6/18 12:00


    3/7/18 08:06


 


 


  (Protonix)  40 mg  DAILY


 PO  3/6/18 12:30


    3/7/18 08:07


 


 


  (Entresto 24-26


 Mg)  1 tab  BID


 PO  3/6/18 12:30


    3/7/18 11:49


 


 


  (Pravachol)  40 mg  DAILY


 PO  3/6/18 12:30


    3/7/18 08:06


 


 


  (Duoneb Neb)  1 ampule  Q6HR  NEB


 NEB  3/6/18 12:30


    3/7/18 15:23


 


 


  (Lexapro)  10 mg  DAILY


 PO  3/6/18 12:30


    3/7/18 08:06


 











A/P


Assessment and Plan


This is a 77 y/o was recently admitted at [facility] in [place] due 

to NSTEMI and discharged to inpatient psychiatry secondary to depression and 

suicidal ideations





Depression/suicidal ideation


-Management per inpatient psychiatrist.  Patient was started on Lexapro.





Recent NSTEMI


previous CABG


-s/p stent native left circumflex. 


-Continue with Entresto 24-26 one bid, metoprolol succinate 25 mg qd, 

furosemide 20 mg qd, aspirin 325 mg qd, plus clopidogrel 75 mg qd.  Follow up 

in 3 weeks with Dr. Martinez.


-Now complaining of brief episodes of chest pain x 2 at rest.  Obtain serial 

troponin and EKGs.





Ischemic cardiomyopathy,  compensated


-EF 30-35% on echo earlier this month, improved compared to 20-25% in 11/2017.


-Continue Entresto and beta blocker


-Monitor for any evidence of fluid overload





Hypertension


-Antihypertensives as stated above.  Still with some mild BP elevations.  


-Continue to monitor trend and adjust treatment as indicated.





HONORIO on CKD


-Creatinine 1.32, was 1.06 yesterday


-Hold Entresto


-encourage by mouth intake


-Repeat BMP in a.m.





COPD, not in acute exacerbation


Chronic Respiratory Failure


-continue home Symbicort, DuoNeb prn. 





Iron deficiency anemia


-Iron 31, TIBC 448, percent saturation 6.9, ferritin 8


-Begin by mouth iron supplementation with vitamin C to help with absorption


-No active bleeding


-Monitor CBC as indicated





DVT Prophylaxis: Bilateral SCDs.  Encourage ambulation.











Chanelle Jimenez Mar 7, 2018 17:23

## 2018-03-07 NOTE — HHI.PYPN
Subjective


Chief Complaint:  severe recurrent depression with suicidal ideation and intent


Remarks


Patient seen in the dickey with nurse Juana counselor Charlotte.,  Chart review, 

patient discussed with nurse.  Patient's mood is improved somewhat he said the 

slept somewhat better last night.  Did discuss first placement options.  It 

appears she went to quite willing to find a small [facility] would accept him.  

Continues with suicidal ideation though it appears somewhat environmental at 

this time.  However I feel he still is a  high risk for suicide.  For now 

continue treatment





Review of Systems


Except as stated in HPI:  all other systems reviewed are Neg





Mental Status Examination


Appearance:  Appropriate


Consciousness:  Alert


Orientation:  x4


Motor Activity:  Other (patient uses walker)


Speech:  Unremarkable


Language:  Adequate


Fund of Knowledge:  Adequate


Attention and Concentration:  Other (fair)


Memory:  Unremarkable


Mood:  Sad


Affect:  Other (decreased range and intensity)


Thought Process & Associations:  Intact


Thought Content:  Appropriate


Hallucination Type:  None


Delusion Type:  None


Suicidal Ideation:  Yes


Suicidal Plan:  No


Suicidal Intention:  Yes


Homicidal Ideation:  No


Homicidal Plan:  No


Homicidal Intention:  No


Insight:  Adequate


Judgment:  Adequate





Results


Labs











Test


  3/7/18


06:10


 


Blood Urea Nitrogen 17 MG/DL 


 


Creatinine 1.32 MG/DL 


 


Random Glucose 91 MG/DL 


 


Calcium Level 8.9 MG/DL 


 


Sodium Level 138 MEQ/L 


 


Potassium Level 4.1 MEQ/L 


 


Chloride Level 106 MEQ/L 


 


Carbon Dioxide Level 22.6 MEQ/L 


 


Anion Gap 9 MEQ/L 


 


Estimat Glomerular Filtration


Rate 53 ML/MIN 


 


 


Triglycerides Level 109 MG/DL 


 


Cholesterol Level 167 MG/DL 


 


LDL Cholesterol 103 MG/DL 


 


HDL Cholesterol 42.7 MG/DL 


 


Cholesterol/HDL Ratio 3.91 RATIO 








Vitals/IOs





Vital Signs








  Date Time  Temp Pulse Resp B/P (MAP) Pulse Ox O2 Delivery O2 Flow Rate FiO2


 


3/7/18 06:00 98.2 61 18 141/76 (97) 96   














Intake and Output   


 


 3/7/18 3/7/18 3/8/18





 08:00 16:00 00:00


 


Intake Total 360 ml  


 


Balance 360 ml  











Assessment & Plan


Problem List:  


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


ICD Codes:  F33.2 - Major depressive disorder, recurrent severe without 

psychotic features


(2) Alcohol use disorder, severe, in sustained remission


ICD Codes:  F10.21 - Alcohol dependence, in remission


Assessment & Plan


Estimated LOS:  days patient remains depressed and suicidal though his after 

slightly improved.  Is showing some good ability to process discharge plans.  

For now continue treatment


Justification for Cont. Inpt.


At this time patient will decompensate in place to the lower level of care


Discharge Planning


Will attempt patient to sign appropriate placement [facility] versus small group home 

versus nursing home


Request HC Surrog/Guard Advoc?:  No











Alex Gatica MD Mar 7, 2018 11:22

## 2018-03-08 VITALS
HEART RATE: 63 BPM | SYSTOLIC BLOOD PRESSURE: 166 MMHG | TEMPERATURE: 98 F | DIASTOLIC BLOOD PRESSURE: 88 MMHG | RESPIRATION RATE: 16 BRPM | OXYGEN SATURATION: 95 %

## 2018-03-08 VITALS
DIASTOLIC BLOOD PRESSURE: 88 MMHG | OXYGEN SATURATION: 95 % | HEART RATE: 63 BPM | TEMPERATURE: 98 F | SYSTOLIC BLOOD PRESSURE: 166 MMHG | RESPIRATION RATE: 16 BRPM

## 2018-03-08 LAB
BUN SERPL-MCNC: 20 MG/DL (ref 7–18)
CALCIUM SERPL-MCNC: 8.9 MG/DL (ref 8.5–10.1)
CHLORIDE SERPL-SCNC: 107 MEQ/L (ref 98–107)
CREAT SERPL-MCNC: 1.31 MG/DL (ref 0.6–1.3)
GFR SERPLBLD BASED ON 1.73 SQ M-ARVRAT: 53 ML/MIN (ref 89–?)
GLUCOSE SERPL-MCNC: 92 MG/DL (ref 74–106)
HCO3 BLD-SCNC: 25.8 MEQ/L (ref 21–32)
MAGNESIUM SERPL-MCNC: 2.3 MG/DL (ref 1.5–2.5)
SODIUM SERPL-SCNC: 139 MEQ/L (ref 136–145)
TROPONIN I SERPL-MCNC: 0.24 NG/ML (ref 0.02–0.05)
TROPONIN I SERPL-MCNC: 0.26 NG/ML (ref 0.02–0.05)

## 2018-03-08 RX ADMIN — CLOPIDOGREL BISULFATE SCH MG: 75 TABLET, FILM COATED ORAL at 09:48

## 2018-03-08 RX ADMIN — IPRATROPIUM BROMIDE AND ALBUTEROL SULFATE SCH AMPULE: .5; 3 SOLUTION RESPIRATORY (INHALATION) at 03:42

## 2018-03-08 RX ADMIN — ASPIRIN SCH MG: 325 TABLET ORAL at 09:49

## 2018-03-08 RX ADMIN — BUDESONIDE AND FORMOTEROL FUMARATE DIHYDRATE SCH PUFF: 80; 4.5 AEROSOL RESPIRATORY (INHALATION) at 09:46

## 2018-03-08 RX ADMIN — FERROUS SULFATE TAB 325 MG (65 MG ELEMENTAL FE) SCH MG: 325 (65 FE) TAB at 09:48

## 2018-03-08 RX ADMIN — IPRATROPIUM BROMIDE AND ALBUTEROL SULFATE SCH AMPULE: .5; 3 SOLUTION RESPIRATORY (INHALATION) at 10:37

## 2018-03-08 RX ADMIN — METOPROLOL SUCCINATE SCH MG: 25 TABLET, EXTENDED RELEASE ORAL at 09:50

## 2018-03-08 RX ADMIN — PRAVASTATIN SODIUM SCH MG: 40 TABLET ORAL at 09:49

## 2018-03-08 RX ADMIN — PANTOPRAZOLE SCH MG: 40 TABLET, DELAYED RELEASE ORAL at 09:50

## 2018-03-08 RX ADMIN — FUROSEMIDE SCH MG: 20 TABLET ORAL at 09:49

## 2018-03-08 RX ADMIN — OXYCODONE HYDROCHLORIDE AND ACETAMINOPHEN SCH MG: 500 TABLET ORAL at 09:48

## 2018-03-08 RX ADMIN — ESCITALOPRAM OXALATE SCH MG: 10 TABLET, FILM COATED ORAL at 09:48
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
## 2018-03-08 NOTE — EKG
Date Performed: 03/07/2018       Time Performed: 15:35:51

 

PTAGE:      78 years

 

EKG:      SINUS BRADYCARDIA INFERIOR MYOCARDIAL INFARCTION , PROBABLY OLD T-WAVE ABNORMALITY, CONSIDE
R LATERAL ISCHEMIA ABNORMAL ECG

 

PREVIOUS TRACING       : 03/03/2018 19.57 No significant change from previous tracing noted.

 

DOCTOR:   Flo Martinez  Interpretating Date/Time  03/08/2018 21:37:34

## 2018-03-08 NOTE — EKG
Date Performed: 03/07/2018       Time Performed: 20:32:48

 

PTAGE:      78 years

 

EKG:      SINUS BRADYCARDIA INFERIOR MYOCARDIAL INFARCTION , PROBABLY OLD T-WAVE ABNORMALITY, CONSIDE
R LATERAL ISCHEMIA ABNORMAL ECG

 

PREVIOUS TRACING       : 03/07/2018 15.35 No significant change from previous tracing noted.

 

DOCTOR:   Flo Martinez  Interpretating Date/Time  03/08/2018 21:24:41

## 2018-04-06 ENCOUNTER — HOSPITAL ENCOUNTER (INPATIENT)
Dept: HOSPITAL 17 - NEPD | Age: 78
LOS: 10 days | Discharge: HOME | DRG: 885 | End: 2018-04-16
Attending: PSYCHIATRY & NEUROLOGY | Admitting: PSYCHIATRY & NEUROLOGY
Payer: OTHER GOVERNMENT

## 2018-04-06 VITALS
SYSTOLIC BLOOD PRESSURE: 168 MMHG | HEART RATE: 69 BPM | RESPIRATION RATE: 20 BRPM | OXYGEN SATURATION: 98 % | DIASTOLIC BLOOD PRESSURE: 79 MMHG

## 2018-04-06 VITALS
SYSTOLIC BLOOD PRESSURE: 178 MMHG | HEART RATE: 63 BPM | TEMPERATURE: 97.9 F | DIASTOLIC BLOOD PRESSURE: 84 MMHG | RESPIRATION RATE: 16 BRPM | OXYGEN SATURATION: 96 %

## 2018-04-06 VITALS — HEIGHT: 66 IN | BODY MASS INDEX: 33.38 KG/M2 | WEIGHT: 207.68 LBS

## 2018-04-06 VITALS
RESPIRATION RATE: 16 BRPM | SYSTOLIC BLOOD PRESSURE: 105 MMHG | OXYGEN SATURATION: 97 % | HEART RATE: 95 BPM | DIASTOLIC BLOOD PRESSURE: 76 MMHG

## 2018-04-06 DIAGNOSIS — Z95.5: ICD-10-CM

## 2018-04-06 DIAGNOSIS — Z79.02: ICD-10-CM

## 2018-04-06 DIAGNOSIS — J44.9: ICD-10-CM

## 2018-04-06 DIAGNOSIS — I13.0: ICD-10-CM

## 2018-04-06 DIAGNOSIS — I25.10: ICD-10-CM

## 2018-04-06 DIAGNOSIS — Z95.1: ICD-10-CM

## 2018-04-06 DIAGNOSIS — Z87.891: ICD-10-CM

## 2018-04-06 DIAGNOSIS — F33.2: Primary | ICD-10-CM

## 2018-04-06 DIAGNOSIS — I25.2: ICD-10-CM

## 2018-04-06 DIAGNOSIS — N18.9: ICD-10-CM

## 2018-04-06 DIAGNOSIS — I50.9: ICD-10-CM

## 2018-04-06 LAB
ALBUMIN SERPL-MCNC: 3.8 GM/DL (ref 3.4–5)
ALP SERPL-CCNC: 61 U/L (ref 45–117)
ALT SERPL-CCNC: 34 U/L (ref 12–78)
APAP SERPL-MCNC: (no result) MCG/ML (ref 10–30)
AST SERPL-CCNC: 30 U/L (ref 15–37)
BASOPHILS # BLD AUTO: 0.1 TH/MM3 (ref 0–0.2)
BASOPHILS NFR BLD: 0.8 % (ref 0–2)
BILIRUB SERPL-MCNC: 0.3 MG/DL (ref 0.2–1)
BUN SERPL-MCNC: 18 MG/DL (ref 7–18)
CALCIUM SERPL-MCNC: 9 MG/DL (ref 8.5–10.1)
CHLORIDE SERPL-SCNC: 106 MEQ/L (ref 98–107)
CREAT SERPL-MCNC: 1.2 MG/DL (ref 0.6–1.3)
EOSINOPHIL # BLD: 0.2 TH/MM3 (ref 0–0.4)
EOSINOPHIL NFR BLD: 2.4 % (ref 0–4)
ERYTHROCYTE [DISTWIDTH] IN BLOOD BY AUTOMATED COUNT: 16.5 % (ref 11.6–17.2)
GFR SERPLBLD BASED ON 1.73 SQ M-ARVRAT: 59 ML/MIN (ref 89–?)
GLUCOSE SERPL-MCNC: 90 MG/DL (ref 74–106)
HCO3 BLD-SCNC: 24.6 MEQ/L (ref 21–32)
HCT VFR BLD CALC: 37.3 % (ref 39–51)
HGB BLD-MCNC: 12 GM/DL (ref 13–17)
LYMPHOCYTES # BLD AUTO: 1.5 TH/MM3 (ref 1–4.8)
LYMPHOCYTES NFR BLD AUTO: 19.6 % (ref 9–44)
MCH RBC QN AUTO: 25 PG (ref 27–34)
MCHC RBC AUTO-ENTMCNC: 32.3 % (ref 32–36)
MCV RBC AUTO: 77.6 FL (ref 80–100)
MONOCYTE #: 0.8 TH/MM3 (ref 0–0.9)
MONOCYTES NFR BLD: 10.4 % (ref 0–8)
NEUTROPHILS # BLD AUTO: 5 TH/MM3 (ref 1.8–7.7)
NEUTROPHILS NFR BLD AUTO: 66.8 % (ref 16–70)
PLATELET # BLD: 273 TH/MM3 (ref 150–450)
PMV BLD AUTO: 7.4 FL (ref 7–11)
PROT SERPL-MCNC: 7.6 GM/DL (ref 6.4–8.2)
RBC # BLD AUTO: 4.81 MIL/MM3 (ref 4.5–5.9)
SODIUM SERPL-SCNC: 140 MEQ/L (ref 136–145)
WBC # BLD AUTO: 7.5 TH/MM3 (ref 4–11)

## 2018-04-06 PROCEDURE — 84443 ASSAY THYROID STIM HORMONE: CPT

## 2018-04-06 PROCEDURE — 94640 AIRWAY INHALATION TREATMENT: CPT

## 2018-04-06 PROCEDURE — 80307 DRUG TEST PRSMV CHEM ANLYZR: CPT

## 2018-04-06 PROCEDURE — 99285 EMERGENCY DEPT VISIT HI MDM: CPT

## 2018-04-06 PROCEDURE — 85025 COMPLETE CBC W/AUTO DIFF WBC: CPT

## 2018-04-06 PROCEDURE — 80053 COMPREHEN METABOLIC PANEL: CPT

## 2018-04-06 PROCEDURE — 80061 LIPID PANEL: CPT

## 2018-04-06 PROCEDURE — 83036 HEMOGLOBIN GLYCOSYLATED A1C: CPT

## 2018-04-06 PROCEDURE — 94664 DEMO&/EVAL PT USE INHALER: CPT

## 2018-04-06 NOTE — PD
HPI


Chief Complaint:  Psychiatric Symptoms


Time Seen by Provider:  14:25


Travel History


International Travel<30 days:  No


Contact w/Intl Traveler<30days:  No


Traveled to known affect area:  No





History of Present Illness


HPI


78-year-old male presents to the emergency department voluntarily for 

psychiatric evaluation.  He apparently was expressing thoughts of suicide to 

his  and was told he should come to the emergency department for 

evaluation, otherwise he would be [place] acted.  Patient reports thoughts of 

suicide all of his life.  He says he has been depressed a long time.  He has 

heart condition and follows the [facility] heart group and last saw them last 

week.  He has history of MI with stent placement, COPD, hypertension.  Has 

history of alcoholism and has been sober 52 years.  Reports current thoughts 

of suicide, but does not have a plan.  He has attempted suicide in the past by 

overdosing.  He denies auditory or visual hallucinations.  He says he talks to 

and listens to God.  Denies illicit drug use.  Symptoms are moderate to severe 

in severity.  Unknown onset.  Unknown duration.  No known relieving factors.  

Aggravated by his health status.  Primary care provider is Dr. Ortiz.  [facility] 

heart group is his cardiologist.  No known allergies.  Takes Plavix.  Has no 

other medical complaints.  Denies chest pain, shortness of breath, abdominal 

pain, vomiting, fevers, change in urine or stool.  No other modifying factors 

or associated signs and symptoms.





PFSH


Past Medical History


Hx Anticoagulant Therapy:  Yes


Arthritis:  Yes (GENERALIZED)


Blood Disorders:  No


Anxiety:  Yes


Depression:  Yes


Heart Rhythm Problems:  No


Cancer:  No


Cardiac Catheterization:  Yes


Cardiovascular Problems:  Yes (CAD, HTN, CABG, MI, CARDIAC ARREST)


High Cholesterol:  Yes


Chest Pain:  Yes


Congestive Heart Failure:  Yes


COPD:  Yes


Diabetes:  No


Endocrine:  No


Gastrointestinal Disorders:  Yes (irritable bowel dz)


Genitourinary:  Yes


Headaches:  No


Hiatal Hernia:  Yes


Hypertension:  Yes


Immune Disorder:  No


Kidney Stones:  No


Musculoskeletal:  Yes


Neurologic:  Yes


Psychiatric:  Yes (untreated Depression for many years )


Respiratory:  Yes (COPD)


Myocardial Infarction:  Yes


Renal Failure:  No


Seizures:  No


Sleep Apnea:  Yes





Past Surgical History


Abdominal Surgery:  Yes (HERNIA REPAIR)


AICD:  No


Cardiac Surgery:  Yes (CABG 2003)


Coronary Artery Bypass Graft:  Yes


Coronary Stent:  Yes (X3)


Ear Surgery:  No


Endocrine Surgery:  No


Eye Surgery:  Yes (IMPLANTS)


Gynecologic Surgery:  No


Joint Replacement:  No


Neurologic Surgery:  Yes (R CEA)


Oral Surgery:  Yes (T&A)


Other Surgery:  Yes (CAROTIDS)





Social History


Alcohol Use:  No (QUIT 40YRS AGO)


Tobacco Use:  No


Substance Use:  No





Allergies-Medications


(Allergen,Severity, Reaction):  


Coded Allergies:  


     No Known Allergies (Verified  Allergy, Unknown, 4/6/18)


Reported Meds & Prescriptions





Reported Meds & Active Scripts


Active


Escitalopram (Escitalopram Oxalate) 10 Mg Tab 10 Mg PO DAILY


Sm Chewable C (Ascorbic Acid) 500 Mg Chw 250 Mg PO BID


Metoprolol Succinate ER 24 HR (Metoprolol Succinate) 25 Mg Tab 25 Mg PO DAILY


Plavix (Clopidogrel Bisulfate) 75 Mg Tab 75 Mg PO DAILY


Symbicort Inh (Budesonide/Formoterol Fumarate) 80-4.5 Mcg/Act Aero 1 Puff INH 

Q12HR


Pantoprazole (Pantoprazole Sodium) 40 Mg Tab 40 Mg PO DAILY


[Albuterol-Ipratropium Neb] 1 AMPULE Nebu 1 Ampule INH Q6HR NEB


Furosemide 20 Mg Tab 20 Mg PO DAILY


Entresto (Sacubitril-Valsartan) 24-26 Mg Tab 1 Tab PO BID


Aspirin 325 Mg Tab 325 Mg PO DAILY


Reported


Simvastatin 20 Mg Tab 20 Mg PO DAILY








Review of Systems


Except as stated in HPI:  all other systems reviewed are Neg





Physical Exam


Narrative


GENERAL: Well-nourished, well-developed elderly  male patient, in no 

acute distress


SKIN: Warm and dry.


HEAD: Atraumatic. Normocephalic. 


EYES: Pupils equal and round.


ENT: Mucosa pink and moist.


NECK: Supple.  Trachea midline.  


CARDIOVASCULAR: Regular rate and rhythm.  No murmur appreciated. 


RESPIRATORY: No accessory muscle use. Clear to auscultation. Breath sounds 

equal bilaterally. 


GASTROINTESTINAL: Abdomen soft, non-tender, nondistended. Hepatic and splenic 

margins not palpable.  Bowel sounds are active 4 quadrants.  


MUSCULOSKELETAL: No obvious deformities. No clubbing.  No cyanosis.  No edema. 


NEUROLOGICAL: Awake and alert.  Oriented 3.  No obvious cranial nerve 

deficits.  Motor grossly within normal limits. Normal speech.  Moves all 

extremities.  5/5 strength to all extremities.


PSYCHIATRIC: No delusional thought processes. No hallucinations.





Data


Data


Last Documented VS





Vital Signs








  Date Time  Temp Pulse Resp B/P (MAP) Pulse Ox O2 Delivery O2 Flow Rate FiO2


 


4/6/18 20:23  69 20 168/79 (108) 98 Room Air  


 


4/6/18 12:01 97.9       








Orders





 Orders


Complete Blood Count With Diff (4/6/18 14:27)


Comprehensive Metabolic Panel (4/6/18 14:27)


Thyroid Stimulating Hormone (4/6/18 14:27)


Psych Screen (4/6/18 14:27)


Drug Screen, Random Urine (4/6/18 14:27)


Alcohol (Ethanol) (4/6/18 14:27)


Salicylates (Aspirin) (4/6/18 14:27)


Tylenol (Acetaminophen) (4/6/18 14:27)


Diet Regular Basic (4/6/18 Dinner)


Sacubitril-Valsartan 24-26 Mg (Entresto (4/6/18 21:00)


Admit Order (Ed Use Only) (4/7/18 )





Labs





Laboratory Tests








Test


  4/6/18


14:45 4/6/18


15:00


 


White Blood Count 7.5 TH/MM3  


 


Red Blood Count 4.81 MIL/MM3  


 


Hemoglobin 12.0 GM/DL  


 


Hematocrit 37.3 %  


 


Mean Corpuscular Volume 77.6 FL  


 


Mean Corpuscular Hemoglobin 25.0 PG  


 


Mean Corpuscular Hemoglobin


Concent 32.3 % 


  


 


 


Red Cell Distribution Width 16.5 %  


 


Platelet Count 273 TH/MM3  


 


Mean Platelet Volume 7.4 FL  


 


Neutrophils (%) (Auto) 66.8 %  


 


Lymphocytes (%) (Auto) 19.6 %  


 


Monocytes (%) (Auto) 10.4 %  


 


Eosinophils (%) (Auto) 2.4 %  


 


Basophils (%) (Auto) 0.8 %  


 


Neutrophils # (Auto) 5.0 TH/MM3  


 


Lymphocytes # (Auto) 1.5 TH/MM3  


 


Monocytes # (Auto) 0.8 TH/MM3  


 


Eosinophils # (Auto) 0.2 TH/MM3  


 


Basophils # (Auto) 0.1 TH/MM3  


 


CBC Comment DIFF FINAL  


 


Differential Comment   


 


Blood Urea Nitrogen 18 MG/DL  


 


Creatinine 1.20 MG/DL  


 


Random Glucose 90 MG/DL  


 


Total Protein 7.6 GM/DL  


 


Albumin 3.8 GM/DL  


 


Calcium Level 9.0 MG/DL  


 


Alkaline Phosphatase 61 U/L  


 


Aspartate Amino Transf


(AST/SGOT) 30 U/L 


  


 


 


Alanine Aminotransferase


(ALT/SGPT) 34 U/L 


  


 


 


Total Bilirubin 0.3 MG/DL  


 


Sodium Level 140 MEQ/L  


 


Potassium Level 4.5 MEQ/L  


 


Chloride Level 106 MEQ/L  


 


Carbon Dioxide Level 24.6 MEQ/L  


 


Anion Gap 9 MEQ/L  


 


Estimat Glomerular Filtration


Rate 59 ML/MIN 


  


 


 


Thyroid Stimulating Hormone


3rd Gen 0.871 uIU/ML 


  


 


 


Salicylates Level


  LESS THAN 1.7


MG/DL 


 


 


Acetaminophen Level


  LESS THAN 2.0


MCG/ML 


 


 


Ethyl Alcohol Level


  LESS THAN 3


MG/DL 


 


 


Urine Opiates Screen  NEG 


 


Urine Barbiturates Screen  NEG 


 


Urine Amphetamines Screen  NEG 


 


Urine Benzodiazepines Screen  NEG 


 


Urine Cocaine Screen  NEG 


 


Urine Cannabinoids Screen  NEG 











MDM


Medical Decision Making


Medical Screen Exam Complete:  Yes


Emergency Medical Condition:  Yes


Medical Record Reviewed:  Yes


Differential Diagnosis


Depression, suicidal ideation, medical clearance for psychiatric admission


Narrative Course


Patient presents voluntarily.  Physical examination and vital signs are 

essentially unremarkable.  Patient has no medical complaints to report.





Psych screen has been ordered.





If the laboratory results are unremarkable, the patient will be medically 

cleared for psychiatric evaluation and disposition.





Diagnosis





 Primary Impression:  


 Medical clearance for psychiatric admission


Condition:  Stable











Preethi Bhatia ARNFACUNDO Apr 6, 2018 14:27
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
## 2018-04-07 VITALS
RESPIRATION RATE: 20 BRPM | SYSTOLIC BLOOD PRESSURE: 123 MMHG | DIASTOLIC BLOOD PRESSURE: 86 MMHG | OXYGEN SATURATION: 97 % | HEART RATE: 61 BPM | TEMPERATURE: 97.5 F

## 2018-04-07 VITALS
RESPIRATION RATE: 16 BRPM | HEART RATE: 55 BPM | OXYGEN SATURATION: 95 % | TEMPERATURE: 98.1 F | SYSTOLIC BLOOD PRESSURE: 111 MMHG | DIASTOLIC BLOOD PRESSURE: 57 MMHG

## 2018-04-07 VITALS
RESPIRATION RATE: 16 BRPM | DIASTOLIC BLOOD PRESSURE: 98 MMHG | OXYGEN SATURATION: 94 % | TEMPERATURE: 98.2 F | SYSTOLIC BLOOD PRESSURE: 184 MMHG | HEART RATE: 62 BPM

## 2018-04-07 RX ADMIN — FUROSEMIDE SCH MG: 20 TABLET ORAL at 09:35

## 2018-04-07 RX ADMIN — SACUBITRIL AND VALSARTAN SCH TAB: 24; 26 TABLET, FILM COATED ORAL at 09:00

## 2018-04-07 RX ADMIN — PRAVASTATIN SODIUM SCH MG: 40 TABLET ORAL at 09:00

## 2018-04-07 RX ADMIN — ESCITALOPRAM OXALATE SCH MG: 10 TABLET, FILM COATED ORAL at 11:30

## 2018-04-07 RX ADMIN — PANTOPRAZOLE SCH MG: 40 TABLET, DELAYED RELEASE ORAL at 09:35

## 2018-04-07 RX ADMIN — PRAVASTATIN SODIUM SCH MG: 40 TABLET ORAL at 22:00

## 2018-04-07 RX ADMIN — SACUBITRIL AND VALSARTAN SCH TAB: 24; 26 TABLET, FILM COATED ORAL at 22:00

## 2018-04-07 RX ADMIN — BUDESONIDE AND FORMOTEROL FUMARATE DIHYDRATE SCH PUFF: 80; 4.5 AEROSOL RESPIRATORY (INHALATION) at 09:00

## 2018-04-07 RX ADMIN — ASPIRIN SCH MG: 325 TABLET ORAL at 09:36

## 2018-04-07 RX ADMIN — OXYCODONE HYDROCHLORIDE AND ACETAMINOPHEN SCH MG: 500 TABLET ORAL at 09:00

## 2018-04-07 RX ADMIN — METOPROLOL SUCCINATE SCH MG: 25 TABLET, EXTENDED RELEASE ORAL at 09:35

## 2018-04-07 RX ADMIN — OXYCODONE HYDROCHLORIDE AND ACETAMINOPHEN SCH MG: 500 TABLET ORAL at 22:00

## 2018-04-07 RX ADMIN — IPRATROPIUM BROMIDE AND ALBUTEROL SULFATE SCH AMPULE: .5; 3 SOLUTION RESPIRATORY (INHALATION) at 10:00

## 2018-04-07 RX ADMIN — CLOPIDOGREL BISULFATE SCH MG: 75 TABLET, FILM COATED ORAL at 09:35

## 2018-04-07 RX ADMIN — IPRATROPIUM BROMIDE AND ALBUTEROL SULFATE SCH AMPULE: .5; 3 SOLUTION RESPIRATORY (INHALATION) at 04:14

## 2018-04-07 NOTE — PD.CONS
HPI


Service


[facility]


Consult Requested By





Primary Care Physician


Bello Ortiz MD


Diagnoses:  


History of Present Illness


Mr. Doyle is a 78-year-old male.  He is admitted to the psych spence secondary to 

depression and suicidal ideation.  Medical consult placed.  Patient has a 

extensive cardiac disease and hypertension.  Uncontrolled hypertension was 

present yesterday and is not seen as of this morning.  No acute complaints and 

patient seen today.  His blood pressure is under control based on latest blood 

pressure readings.  He says as an outpatient he does not have good blood 

pressure control.





Review of Systems


Respiratory:  DENIES: Cough, Wheezing, Shortness of breath


Cardiovascular:  DENIES: Chest pain, Palpitations, Syncope


Psychiatric:  COMPLAINS OF: Depression, DENIES: Anxiety, Confusion





Past Family Social History


Allergies:  


Coded Allergies:  


     No Known Allergies (Verified  Allergy, Unknown, 4/6/18)


Past Medical History


Hypertension


Chronic kidney disease


CHF


Coronary artery disease


Old MI


Past Surgical History


CABG 3


Hernia repair


Tonsillectomy


Adenoidectomy


Reported Medications





Reported Meds & Active Scripts


Active


Escitalopram (Escitalopram Oxalate) 10 Mg Tab 10 Mg PO DAILY


Sm Chewable C (Ascorbic Acid) 500 Mg Chw 250 Mg PO BID


Metoprolol Succinate ER 24 HR (Metoprolol Succinate) 25 Mg Tab 25 Mg PO DAILY


Plavix (Clopidogrel Bisulfate) 75 Mg Tab 75 Mg PO DAILY


Symbicort Inh (Budesonide/Formoterol Fumarate) 80-4.5 Mcg/Act Aero 1 Puff INH 

Q12HR


Pantoprazole (Pantoprazole Sodium) 40 Mg Tab 40 Mg PO DAILY


[Albuterol-Ipratropium Neb] 1 AMPULE Nebu 1 Ampule INH Q6HR NEB


Furosemide 20 Mg Tab 20 Mg PO DAILY


Entresto (Sacubitril-Valsartan) 24-26 Mg Tab 1 Tab PO BID


Aspirin 325 Mg Tab 325 Mg PO DAILY


Reported


Simvastatin 20 Mg Tab 20 Mg PO DAILY


Active Ordered Medications





Administered Medications








 Medications


  (Trade)  Dose


 Ordered  Sig/Armen


 Route


 PRN Reason  Start Time


 Stop Time Status Last Admin


Dose Admin


 


 Aspirin


  (Aspirin)  325 mg  DAILY


 PO


   4/7/18 09:00


    4/7/18 09:36


 


 


 Clopidogrel


 Bisulfate


  (Plavix)  75 mg  DAILY


 PO


   4/7/18 09:00


    4/7/18 09:35


 


 


 Furosemide


  (Lasix)  20 mg  DAILY


 PO


   4/7/18 09:00


    4/7/18 09:35


 


 


 Metoprolol


 Succinate


  (Toprol Xl)  25 mg  DAILY


 PO


   4/7/18 09:00


    4/7/18 09:35


 


 


 Pantoprazole


 Sodium


  (Protonix)  40 mg  DAILY


 PO


   4/7/18 09:00


    4/7/18 09:35


 


 


 Escitalopram


 Oxalate


  (Lexapro)  10 mg  DAILY


 PO


   4/7/18 11:30


    4/7/18 11:30


 








Family History


Coronary artery disease in brother


CVA in mother


Social History


No smoking, patient has a distant history of smoking


No alcohol abuse, patient is a distant history of alcohol abuse


No illicit drug abuse





Physical Exam


Vital Signs





Vital Signs








  Date Time  Temp Pulse Resp B/P (MAP) Pulse Ox O2 Delivery O2 Flow Rate FiO2


 


4/7/18 06:19 98.1 55 16 111/57 (75) 95   


 


4/7/18 01:30 98.2 62 16 184/98 (126) 94   





    Automatic Cuff    


 


4/6/18 20:23  69 20 168/79 (108) 98 Room Air  








Physical Exam


GENERAL: NAD, A&Ox3


HEAD: Normocephalic. 


NECK: Supple, trachea midline. No lymphadenopathy.


EYES: No scleral icterus. No injection or drainage. 


CARDIOVASCULAR: Regular rate and rhythm without murmurs, gallops, or rubs. 


RESPIRATORY: Breath sounds equal bilaterally. No accessory muscle use.


GASTROINTESTINAL: Abdomen soft, non-tender, nondistended. 


MUSCULOSKELETAL: No cyanosis, or edema. 


SKIN: Warm and dry.


NEURO:  No focal neurological deficitis.


Result Diagram:  


4/6/18 1445                                                                    

            4/6/18 1445








Assessment and Plan


Problem List:  


(1) Hypertension


ICD Code:  I10 - Essential (primary) hypertension


(2) Suicidal ideation


ICD Code:  R45.851 - Suicidal ideations


(3) Depression


ICD Code:  F32.9 - Major depressive disorder, single episode, unspecified


(4) CAD (coronary artery disease)


ICD Code:  I25.10 - Atherosclerotic heart disease of native coronary artery 

without angina pectoris


Status:  Chronic


Assessment and Plan


78-year-old male admitted to psychiatry secondary to suicidal ideation and 

depression.  Medical consult placed for medical management, patient had 

uncontrolled hypertension earlier.





Hypertension


Continue baseline treatments


Follow blood pressures


Begin an as needed clonidine





Chronic kidney disease


No acute concerns most recent blood work


No need to follow further





CHF


Coronary artery disease


Old MI


No chest pain reported


Fluid balance is present


Follow clinically





DVT prophylaxis


Patient is ambulatory





Problem Qualifiers





(1) Depression:  


Qualified Codes:  F32.9 - Major depressive disorder, single episode, unspecified








Daniel Rader MD Apr 7, 2018 15:42
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
## 2018-04-08 VITALS — DIASTOLIC BLOOD PRESSURE: 80 MMHG | HEART RATE: 64 BPM | SYSTOLIC BLOOD PRESSURE: 123 MMHG

## 2018-04-08 VITALS
SYSTOLIC BLOOD PRESSURE: 146 MMHG | DIASTOLIC BLOOD PRESSURE: 78 MMHG | HEART RATE: 64 BPM | RESPIRATION RATE: 18 BRPM | OXYGEN SATURATION: 97 % | TEMPERATURE: 97.9 F

## 2018-04-08 VITALS
RESPIRATION RATE: 18 BRPM | OXYGEN SATURATION: 97 % | HEART RATE: 62 BPM | TEMPERATURE: 97.6 F | SYSTOLIC BLOOD PRESSURE: 117 MMHG | DIASTOLIC BLOOD PRESSURE: 58 MMHG

## 2018-04-08 LAB
CHOLEST SERPL-MCNC: 191 MG/DL (ref 120–200)
CHOLESTEROL/ HDL RATIO: 4.46 RATIO
HBA1C MFR BLD: 6.2 % (ref 4.3–6)
HDLC SERPL-MCNC: 42.8 MG/DL (ref 40–60)
LDLC SERPL-MCNC: 122 MG/DL (ref 0–99)
TRIGL SERPL-MCNC: 131 MG/DL (ref 42–150)

## 2018-04-08 RX ADMIN — IPRATROPIUM BROMIDE AND ALBUTEROL SULFATE SCH AMPULE: .5; 3 SOLUTION RESPIRATORY (INHALATION) at 10:00

## 2018-04-08 RX ADMIN — SACUBITRIL AND VALSARTAN SCH TAB: 24; 26 TABLET, FILM COATED ORAL at 09:51

## 2018-04-08 RX ADMIN — OXYCODONE HYDROCHLORIDE AND ACETAMINOPHEN SCH MG: 500 TABLET ORAL at 21:39

## 2018-04-08 RX ADMIN — CLOPIDOGREL BISULFATE SCH MG: 75 TABLET, FILM COATED ORAL at 09:50

## 2018-04-08 RX ADMIN — ESCITALOPRAM OXALATE SCH MG: 10 TABLET, FILM COATED ORAL at 09:51

## 2018-04-08 RX ADMIN — SACUBITRIL AND VALSARTAN SCH TAB: 24; 26 TABLET, FILM COATED ORAL at 21:41

## 2018-04-08 RX ADMIN — BUDESONIDE AND FORMOTEROL FUMARATE DIHYDRATE SCH PUFF: 80; 4.5 AEROSOL RESPIRATORY (INHALATION) at 04:45

## 2018-04-08 RX ADMIN — ASPIRIN SCH MG: 325 TABLET ORAL at 09:50

## 2018-04-08 RX ADMIN — BUDESONIDE AND FORMOTEROL FUMARATE DIHYDRATE SCH PUFF: 80; 4.5 AEROSOL RESPIRATORY (INHALATION) at 21:39

## 2018-04-08 RX ADMIN — IPRATROPIUM BROMIDE AND ALBUTEROL SULFATE SCH AMPULE: .5; 3 SOLUTION RESPIRATORY (INHALATION) at 04:00

## 2018-04-08 RX ADMIN — PRAVASTATIN SODIUM SCH MG: 40 TABLET ORAL at 21:39

## 2018-04-08 RX ADMIN — PANTOPRAZOLE SCH MG: 40 TABLET, DELAYED RELEASE ORAL at 09:51

## 2018-04-08 RX ADMIN — METOPROLOL SUCCINATE SCH MG: 25 TABLET, EXTENDED RELEASE ORAL at 09:50

## 2018-04-08 RX ADMIN — BUDESONIDE AND FORMOTEROL FUMARATE DIHYDRATE SCH PUFF: 80; 4.5 AEROSOL RESPIRATORY (INHALATION) at 09:50

## 2018-04-08 RX ADMIN — IPRATROPIUM BROMIDE AND ALBUTEROL SULFATE SCH AMPULE: .5; 3 SOLUTION RESPIRATORY (INHALATION) at 22:00

## 2018-04-08 RX ADMIN — OXYCODONE HYDROCHLORIDE AND ACETAMINOPHEN SCH MG: 500 TABLET ORAL at 09:51

## 2018-04-08 RX ADMIN — FUROSEMIDE SCH MG: 20 TABLET ORAL at 09:51

## 2018-04-08 NOTE — HHI.PYPN
Subjective


Remarks


Reviewed electronic medical record discussed case with staff.  Follow-up 

performed in hallway with nurse present.  Patient has been out ambulating in 

the hallways with his walker throughout the day.  He reports that he feels "all 

right".  Patient extremely chatty with this provider and staff.  His mood seems 

good and his affect seems euthymic.  He denies any complaints at this moment.





Mental Status Examination


Appearance:  Appropriate


Consciousness:  Alert


Orientation:  x4


Motor Activity:  Normal gait


Speech:  Unremarkable


Language:  Adequate


Fund of Knowledge:  Adequate


Attention and Concentration:  Adequate


Memory:  Unremarkable


Mood:  Good


Affect:  Euthymic


Thought Process & Associations:  Intact, Linear


Thought Content:  Appropriate


Hallucination Type:  None


Delusion Type:  None


Suicidal Ideation:  Yes (States he would not mind if he )


Suicidal Plan:  No


Suicidal Intention:  Yes


Homicidal Ideation:  No


Homicidal Plan:  No


Homicidal Intention:  No


Insight:  Fair


Judgment:  Impulsive





Results


Labs











Test


  18


07:36


 


Hemoglobin A1c 6.2 % 


 


Triglycerides Level 131 MG/DL 


 


Cholesterol Level 191 MG/DL 


 


LDL Cholesterol 122 MG/DL 


 


HDL Cholesterol 42.8 MG/DL 


 


Cholesterol/HDL Ratio 4.46 RATIO 








Vitals/IOs





Vital Signs








  Date Time  Temp Pulse Resp B/P (MAP) Pulse Ox O2 Delivery O2 Flow Rate FiO2


 


18 08:26  64  123/80 (94)    


 


18 06:15 97.6  18  97   


 


18 20:23      Room Air  











Assessment & Plan


Problem List:  


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


ICD Codes:  F33.2 - Major depressive disorder, recurrent severe without 

psychotic features


Assessment & Plan


Estimated LOS: Will continue with current plan of treatment.  Days


Justification for Cont. Inpt.


Moving this patient to a lower level of care would likely result in him 

decompensating.











Ruth Churchill 2018 18:25

## 2018-04-09 VITALS
RESPIRATION RATE: 18 BRPM | OXYGEN SATURATION: 96 % | DIASTOLIC BLOOD PRESSURE: 58 MMHG | HEART RATE: 70 BPM | SYSTOLIC BLOOD PRESSURE: 123 MMHG | TEMPERATURE: 97.4 F

## 2018-04-09 VITALS
HEART RATE: 69 BPM | TEMPERATURE: 97.3 F | SYSTOLIC BLOOD PRESSURE: 129 MMHG | DIASTOLIC BLOOD PRESSURE: 61 MMHG | OXYGEN SATURATION: 93 % | RESPIRATION RATE: 16 BRPM

## 2018-04-09 RX ADMIN — ESCITALOPRAM OXALATE SCH MG: 10 TABLET, FILM COATED ORAL at 09:32

## 2018-04-09 RX ADMIN — CLOPIDOGREL BISULFATE SCH MG: 75 TABLET, FILM COATED ORAL at 09:32

## 2018-04-09 RX ADMIN — OXYCODONE HYDROCHLORIDE AND ACETAMINOPHEN SCH MG: 500 TABLET ORAL at 09:33

## 2018-04-09 RX ADMIN — PANTOPRAZOLE SCH MG: 40 TABLET, DELAYED RELEASE ORAL at 09:32

## 2018-04-09 RX ADMIN — OXYCODONE HYDROCHLORIDE AND ACETAMINOPHEN SCH MG: 500 TABLET ORAL at 20:29

## 2018-04-09 RX ADMIN — PRAVASTATIN SODIUM SCH MG: 40 TABLET ORAL at 20:29

## 2018-04-09 RX ADMIN — BUDESONIDE AND FORMOTEROL FUMARATE DIHYDRATE SCH PUFF: 80; 4.5 AEROSOL RESPIRATORY (INHALATION) at 09:38

## 2018-04-09 RX ADMIN — IPRATROPIUM BROMIDE AND ALBUTEROL SULFATE SCH AMPULE: .5; 3 SOLUTION RESPIRATORY (INHALATION) at 17:20

## 2018-04-09 RX ADMIN — BUDESONIDE AND FORMOTEROL FUMARATE DIHYDRATE SCH PUFF: 80; 4.5 AEROSOL RESPIRATORY (INHALATION) at 20:28

## 2018-04-09 RX ADMIN — ASPIRIN SCH MG: 325 TABLET ORAL at 09:32

## 2018-04-09 RX ADMIN — IPRATROPIUM BROMIDE AND ALBUTEROL SULFATE SCH AMPULE: .5; 3 SOLUTION RESPIRATORY (INHALATION) at 04:24

## 2018-04-09 RX ADMIN — SACUBITRIL AND VALSARTAN SCH TAB: 24; 26 TABLET, FILM COATED ORAL at 20:29

## 2018-04-09 RX ADMIN — FUROSEMIDE SCH MG: 20 TABLET ORAL at 09:33

## 2018-04-09 RX ADMIN — SACUBITRIL AND VALSARTAN SCH TAB: 24; 26 TABLET, FILM COATED ORAL at 09:33

## 2018-04-09 RX ADMIN — IPRATROPIUM BROMIDE AND ALBUTEROL SULFATE SCH AMPULE: .5; 3 SOLUTION RESPIRATORY (INHALATION) at 11:30

## 2018-04-09 RX ADMIN — METOPROLOL SUCCINATE SCH MG: 25 TABLET, EXTENDED RELEASE ORAL at 09:59

## 2018-04-09 NOTE — HHI.PYPN
Subjective


Remarks


Patient seen for follow-up, chart reviewed. Discussion with nursing staff 

reported patient cooperative, pleasant, compliant with medications.  Patient 

was found sitting on hospital bed, calm and cooperative.  He states that he 

continues to feel the same, "not that good", continues to endorse not having 

any specific reason to live and just waiting to die but denies any plan or 

intent on ending his life.  He spends a great deal of time recanting his past 

experiences with pleasure and appears to enjoy talking about his past.  He 

reports sleeping well, good appetite.





Review of Systems


Except as stated in HPI:  all other systems reviewed are Neg





Mental Status Examination


Appearance:  Appropriate


Consciousness:  Alert


Orientation:  x4


Motor Activity:  Normal gait


Speech:  Unremarkable


Language:  Adequate


Fund of Knowledge:  Adequate


Attention and Concentration:  Adequate


Memory:  Unremarkable


Mood:  Good


Affect:  Euthymic


Thought Process & Associations:  Intact, Linear


Thought Content:  Appropriate


Hallucination Type:  None


Delusion Type:  None


Suicidal Ideation:  Yes (States he would not mind if he )


Suicidal Plan:  No


Suicidal Intention:  No


Homicidal Ideation:  No


Homicidal Plan:  No


Homicidal Intention:  No


Insight:  Fair


Judgment:  Impulsive





Results


Vitals/IOs





Vital Signs








  Date Time  Temp Pulse Resp B/P (MAP) Pulse Ox O2 Delivery O2 Flow Rate FiO2


 


18 06:07 97.3 69 16 129/61 (83) 93   


 


18 20:23      Room Air  














Intake and Output   


 


 4/9/18 4/9/18 4/10/18





 08:00 16:00 00:00


 


Intake Total 240 ml  


 


Balance 240 ml  











Assessment & Plan


Problem List:  


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


ICD Codes:  F33.2 - Major depressive disorder, recurrent severe without 

psychotic features


Assessment & Plan


Patient continues to report feeling depressed, with thoughts of not wanting to 

be alive but denies any active suicidal ideations. Will increase escitalopram 

to 20mg PO daily, continue rest of medications.  Continue to monitor mood and 

behavior.  Discharge planning in progress.


Justification for Cont. Inpt.


At risk for further decompensation at lower level of care.


Discharge Planning


Back to friend's residence











Naga Perez MD 2018 16:59

## 2018-04-09 NOTE — PD.TTN
Patient Problems


1. Discharge planning


2. Medication compliance


3. Knowledge deficit


4. Lack of coping skills





Progress Toward Goals


Provider Present:  Dr. OTF Perez


Provider Input:  


4/09/18 - Dr. Perez reported that the [facility] is [facility].  [facility] is a [facility] for the [facility] nursing


[facility].  Presently the patient is staying with a friend and has home health


care.  A [facility]  will need to assist the patient in filling out


the thirty page application.


Psychiatric Counselors Present:  SEBASTAIN Palmer


Psych Therapist Input:  


4/09/18 - Counselor will direct the patient to his [facility]  who


may assist the patient with housing application.


Group Spec/RT/OT/MOREIRA Present:  JOSH Felipe


Group Spec/RT/OT/MOREIRA Input:  


4/09/18 - Patient attends select group activities and is pleasant and


cooperative.





Discharge Plan


4/09/18 - [facility] the [facility].





Documentation


Scribe:  SEBASTIAN Palmer


Date Resolved:  Apr 9, 2018











Staci Wiley Apr 9, 2018 16:08

## 2018-04-10 VITALS
OXYGEN SATURATION: 98 % | RESPIRATION RATE: 18 BRPM | HEART RATE: 65 BPM | SYSTOLIC BLOOD PRESSURE: 118 MMHG | DIASTOLIC BLOOD PRESSURE: 73 MMHG

## 2018-04-10 VITALS
DIASTOLIC BLOOD PRESSURE: 67 MMHG | RESPIRATION RATE: 16 BRPM | HEART RATE: 61 BPM | TEMPERATURE: 97.4 F | OXYGEN SATURATION: 95 % | SYSTOLIC BLOOD PRESSURE: 130 MMHG

## 2018-04-10 VITALS — OXYGEN SATURATION: 97 %

## 2018-04-10 RX ADMIN — METOPROLOL SUCCINATE SCH MG: 25 TABLET, EXTENDED RELEASE ORAL at 08:51

## 2018-04-10 RX ADMIN — PRAVASTATIN SODIUM SCH MG: 40 TABLET ORAL at 21:00

## 2018-04-10 RX ADMIN — IPRATROPIUM BROMIDE AND ALBUTEROL SULFATE SCH AMPULE: .5; 3 SOLUTION RESPIRATORY (INHALATION) at 16:00

## 2018-04-10 RX ADMIN — ESCITALOPRAM OXALATE SCH MG: 20 TABLET ORAL at 08:51

## 2018-04-10 RX ADMIN — BUDESONIDE AND FORMOTEROL FUMARATE DIHYDRATE SCH PUFF: 80; 4.5 AEROSOL RESPIRATORY (INHALATION) at 20:51

## 2018-04-10 RX ADMIN — CLOPIDOGREL BISULFATE SCH MG: 75 TABLET, FILM COATED ORAL at 08:51

## 2018-04-10 RX ADMIN — ASPIRIN SCH MG: 325 TABLET ORAL at 08:51

## 2018-04-10 RX ADMIN — SACUBITRIL AND VALSARTAN SCH TAB: 24; 26 TABLET, FILM COATED ORAL at 09:00

## 2018-04-10 RX ADMIN — OXYCODONE HYDROCHLORIDE AND ACETAMINOPHEN SCH MG: 500 TABLET ORAL at 08:51

## 2018-04-10 RX ADMIN — OXYCODONE HYDROCHLORIDE AND ACETAMINOPHEN SCH MG: 500 TABLET ORAL at 20:49

## 2018-04-10 RX ADMIN — IPRATROPIUM BROMIDE AND ALBUTEROL SULFATE SCH AMPULE: .5; 3 SOLUTION RESPIRATORY (INHALATION) at 09:05

## 2018-04-10 RX ADMIN — SACUBITRIL AND VALSARTAN SCH TAB: 24; 26 TABLET, FILM COATED ORAL at 21:00

## 2018-04-10 RX ADMIN — PANTOPRAZOLE SCH MG: 40 TABLET, DELAYED RELEASE ORAL at 08:51

## 2018-04-10 RX ADMIN — BUDESONIDE AND FORMOTEROL FUMARATE DIHYDRATE SCH PUFF: 80; 4.5 AEROSOL RESPIRATORY (INHALATION) at 08:50

## 2018-04-10 RX ADMIN — IPRATROPIUM BROMIDE AND ALBUTEROL SULFATE SCH AMPULE: .5; 3 SOLUTION RESPIRATORY (INHALATION) at 03:35

## 2018-04-10 RX ADMIN — IPRATROPIUM BROMIDE AND ALBUTEROL SULFATE SCH AMPULE: .5; 3 SOLUTION RESPIRATORY (INHALATION) at 22:50

## 2018-04-10 RX ADMIN — FUROSEMIDE SCH MG: 20 TABLET ORAL at 08:51
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
## 2018-04-11 VITALS
DIASTOLIC BLOOD PRESSURE: 58 MMHG | HEART RATE: 64 BPM | TEMPERATURE: 98.1 F | SYSTOLIC BLOOD PRESSURE: 106 MMHG | RESPIRATION RATE: 18 BRPM | OXYGEN SATURATION: 98 %

## 2018-04-11 VITALS
OXYGEN SATURATION: 97 % | DIASTOLIC BLOOD PRESSURE: 59 MMHG | HEART RATE: 65 BPM | SYSTOLIC BLOOD PRESSURE: 95 MMHG | TEMPERATURE: 97.4 F | RESPIRATION RATE: 17 BRPM

## 2018-04-11 RX ADMIN — SACUBITRIL AND VALSARTAN SCH TAB: 24; 26 TABLET, FILM COATED ORAL at 21:02

## 2018-04-11 RX ADMIN — OXYCODONE HYDROCHLORIDE AND ACETAMINOPHEN SCH MG: 500 TABLET ORAL at 08:31

## 2018-04-11 RX ADMIN — OXYCODONE HYDROCHLORIDE AND ACETAMINOPHEN SCH MG: 500 TABLET ORAL at 21:02

## 2018-04-11 RX ADMIN — FUROSEMIDE SCH MG: 20 TABLET ORAL at 08:31

## 2018-04-11 RX ADMIN — ASPIRIN SCH MG: 325 TABLET ORAL at 08:30

## 2018-04-11 RX ADMIN — METOPROLOL SUCCINATE SCH MG: 25 TABLET, EXTENDED RELEASE ORAL at 08:30

## 2018-04-11 RX ADMIN — SACUBITRIL AND VALSARTAN SCH TAB: 24; 26 TABLET, FILM COATED ORAL at 08:32

## 2018-04-11 RX ADMIN — BUDESONIDE AND FORMOTEROL FUMARATE DIHYDRATE SCH PUFF: 80; 4.5 AEROSOL RESPIRATORY (INHALATION) at 21:02

## 2018-04-11 RX ADMIN — ESCITALOPRAM OXALATE SCH MG: 20 TABLET ORAL at 08:31

## 2018-04-11 RX ADMIN — CLOPIDOGREL BISULFATE SCH MG: 75 TABLET, FILM COATED ORAL at 08:31

## 2018-04-11 RX ADMIN — BUDESONIDE AND FORMOTEROL FUMARATE DIHYDRATE SCH PUFF: 80; 4.5 AEROSOL RESPIRATORY (INHALATION) at 08:30

## 2018-04-11 RX ADMIN — PRAVASTATIN SODIUM SCH MG: 40 TABLET ORAL at 21:02

## 2018-04-11 RX ADMIN — PANTOPRAZOLE SCH MG: 40 TABLET, DELAYED RELEASE ORAL at 08:30

## 2018-04-11 NOTE — PD.TTN
Patient Problems


1. Discharge planning


2. Medication compliance


3. Knowledge deficit


4. Lack of coping skills





Progress Toward Goals


Provider Present:  Dr. OTF Perez


Provider Input:  


04/11/2018; patient's medication is being adjusted


4/09/18 - Dr. Perez reported that the patient is [facility].  There is a long wait [facility] for the [facility] nursing


home.  Presently the patient is staying with a friend and has home health


care.  A [facility]  will need to assist the patient in filling out


the thirty page application.


Nurse(s) Present:  HEIDY Sellers


Nurse(s) Input:  


04/11/2018; patient is eating and taking his medication, requires


redirection with behavior


Psychiatric Counselors Present:  CARLOS Corea, SEBASTIAN Palmer


Psych Therapist Input:  


04/11/2018; counselor call Alvarado,  supervisor; please refer to note


charted





4/09/18 - Counselor will direct the patient to his [facility]  who


may assist the patient with housing application.


Group Spec/RT/OT/MOREIRA Present:  JOSH Felipe


Group Spec/RT/OT/MOREIRA Input:  


04/11/2018; patient attends groups with appropriate behavior





4/09/18 - Patient attends select group activities and is pleasant and


cooperative.





Discharge Plan


4/09/18 - [facility] the [facility].





Documentation


Scribe:  Carlee Fong


Date Resolved:  Apr 9, 2018











Carlee Fong Apr 11, 2018 11:17

## 2018-04-12 VITALS
DIASTOLIC BLOOD PRESSURE: 83 MMHG | RESPIRATION RATE: 16 BRPM | TEMPERATURE: 98 F | OXYGEN SATURATION: 93 % | HEART RATE: 62 BPM | SYSTOLIC BLOOD PRESSURE: 124 MMHG

## 2018-04-12 VITALS
DIASTOLIC BLOOD PRESSURE: 81 MMHG | OXYGEN SATURATION: 95 % | TEMPERATURE: 97.8 F | RESPIRATION RATE: 17 BRPM | HEART RATE: 66 BPM | SYSTOLIC BLOOD PRESSURE: 145 MMHG

## 2018-04-12 VITALS — OXYGEN SATURATION: 97 %

## 2018-04-12 RX ADMIN — IPRATROPIUM BROMIDE AND ALBUTEROL SULFATE SCH AMPULE: .5; 3 SOLUTION RESPIRATORY (INHALATION) at 19:40

## 2018-04-12 RX ADMIN — PANTOPRAZOLE SCH MG: 40 TABLET, DELAYED RELEASE ORAL at 08:48

## 2018-04-12 RX ADMIN — BUDESONIDE AND FORMOTEROL FUMARATE DIHYDRATE SCH PUFF: 80; 4.5 AEROSOL RESPIRATORY (INHALATION) at 08:48

## 2018-04-12 RX ADMIN — SACUBITRIL AND VALSARTAN SCH TAB: 24; 26 TABLET, FILM COATED ORAL at 08:48

## 2018-04-12 RX ADMIN — FUROSEMIDE SCH MG: 20 TABLET ORAL at 08:48

## 2018-04-12 RX ADMIN — OXYCODONE HYDROCHLORIDE AND ACETAMINOPHEN SCH MG: 500 TABLET ORAL at 08:48

## 2018-04-12 RX ADMIN — OXYCODONE HYDROCHLORIDE AND ACETAMINOPHEN SCH MG: 500 TABLET ORAL at 20:52

## 2018-04-12 RX ADMIN — SACUBITRIL AND VALSARTAN SCH TAB: 24; 26 TABLET, FILM COATED ORAL at 20:50

## 2018-04-12 RX ADMIN — PRAVASTATIN SODIUM SCH MG: 40 TABLET ORAL at 20:51

## 2018-04-12 RX ADMIN — IPRATROPIUM BROMIDE AND ALBUTEROL SULFATE SCH AMPULE: .5; 3 SOLUTION RESPIRATORY (INHALATION) at 13:09

## 2018-04-12 RX ADMIN — METOPROLOL SUCCINATE SCH MG: 25 TABLET, EXTENDED RELEASE ORAL at 08:49

## 2018-04-12 RX ADMIN — BUPROPION HYDROCHLORIDE SCH MG: 150 TABLET, FILM COATED ORAL at 10:00

## 2018-04-12 RX ADMIN — CLOPIDOGREL BISULFATE SCH MG: 75 TABLET, FILM COATED ORAL at 08:49

## 2018-04-12 RX ADMIN — ESCITALOPRAM OXALATE SCH MG: 20 TABLET ORAL at 08:48

## 2018-04-12 RX ADMIN — ASPIRIN SCH MG: 325 TABLET ORAL at 08:48

## 2018-04-12 RX ADMIN — BUDESONIDE AND FORMOTEROL FUMARATE DIHYDRATE SCH PUFF: 80; 4.5 AEROSOL RESPIRATORY (INHALATION) at 20:50

## 2018-04-12 NOTE — HHI.PYPN
Subjective


Remarks


LATE ENTRY FOR 18





Patient seen for follow up; chart reviewed. Discussion with nursing staff 

reported that the patient seclusive, compliant with medications. Patient found 

sitting on hospital bed, noted to be dysphoric, states having had poor sleep 

last night because of the noise on the unit last night. He continues to appear 

dysphoric, feeling upset due to seeing another patient given ETO, stating not 

caring what happens to him.





Review of Systems


Except as stated in HPI:  all other systems reviewed are Neg





Mental Status Examination


Appearance:  Appropriate


Consciousness:  Alert


Orientation:  x4


Motor Activity:  Normal gait


Speech:  Unremarkable


Language:  Adequate


Fund of Knowledge:  Adequate


Attention and Concentration:  Adequate


Memory:  Unremarkable


Mood:  Sad


Affect:  Sad


Thought Process & Associations:  Intact, Linear


Thought Content:  Appropriate


Hallucination Type:  None


Delusion Type:  None


Suicidal Ideation:  Yes (States he would not mind if he )


Suicidal Plan:  No


Suicidal Intention:  No


Homicidal Ideation:  No


Homicidal Plan:  No


Homicidal Intention:  No


Insight:  Fair


Judgment:  Impulsive





Results


Vitals/IOs





Vital Signs








  Date Time  Temp Pulse Resp B/P (MAP) Pulse Ox O2 Delivery O2 Flow Rate FiO2


 


18 05:55 98.0 62 16 124/83 (97) 93   














Intake and Output   


 


 18





 08:00 16:00 00:00


 


Intake Total  0 ml 


 


Balance  0 ml 











Assessment & Plan


Problem List:  


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


ICD Codes:  F33.2 - Major depressive disorder, recurrent severe without 

psychotic features


Assessment & Plan


Patient continues to be dysphoric, anhedonic, hopeless with continued depressed 

mood. Increase escilatopram to 20mg PO daily for depression, continue rest of 

medications. Continue to monitor mood and behavior.  Discharge planning in 

progress.


Justification for Cont. Inpt.


At risk for further decompensation at lower level of care.











Naga Perez MD 2018 16:36

## 2018-04-12 NOTE — HHI.PYPN
Subjective


Remarks


Patient seen for follow up; chart reviewed. Discussion with nursing staff 

reported patient eating less, continues to be depressed. Patient found sitting 

on hospital bed, calm and cooperative. Patient states that having attended 

group yesterday, concerned of not having received his respiratory treatments. 

He spend time speaking about his experience as a recovering addict and having 

been a speaker in the past.  He continues to endorse not caring what happens to 

him and waiting to die.





Review of Systems


Except as stated in HPI:  all other systems reviewed are Neg





Mental Status Examination


Appearance:  Appropriate


Consciousness:  Alert


Orientation:  x4


Motor Activity:  Normal gait


Speech:  Unremarkable


Language:  Adequate


Fund of Knowledge:  Adequate


Attention and Concentration:  Adequate


Memory:  Unremarkable


Mood:  Sad


Affect:  Sad


Thought Process & Associations:  Intact, Linear


Thought Content:  Appropriate


Hallucination Type:  None


Delusion Type:  None


Suicidal Ideation:  Yes (States he would not mind if he )


Suicidal Plan:  No


Suicidal Intention:  No


Homicidal Ideation:  No


Homicidal Plan:  No


Homicidal Intention:  No


Insight:  Fair


Judgment:  Impulsive





Results


Vitals/IOs





Vital Signs








  Date Time  Temp Pulse Resp B/P (MAP) Pulse Ox O2 Delivery O2 Flow Rate FiO2


 


18 05:55 98.0 62 16 124/83 (97) 93   














Intake and Output   


 


 18





 08:00 16:00 00:00


 


Intake Total  0 ml 


 


Balance  0 ml 











Assessment & Plan


Problem List:  


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


ICD Codes:  F33.2 - Major depressive disorder, recurrent severe without 

psychotic features


Assessment & Plan


Patient continues with poor appetite and PO intake, continues dysphoric and 

depressed. Will start wellbutrin SR 150mg PO daily, continue rest of 

medications.  Request dietitian consult for poor PO intake. Continue to 

encourage patient to participate in groups and activities. Brief supportive 

psychotherapy provided. Discharge planning in progress.


Justification for Cont. Inpt.


At risk for further decompensation at lower level of care.











Naga Perez MD 2018 16:41

## 2018-04-13 VITALS
SYSTOLIC BLOOD PRESSURE: 127 MMHG | RESPIRATION RATE: 16 BRPM | OXYGEN SATURATION: 95 % | TEMPERATURE: 98.1 F | HEART RATE: 58 BPM | DIASTOLIC BLOOD PRESSURE: 78 MMHG

## 2018-04-13 VITALS
HEART RATE: 62 BPM | DIASTOLIC BLOOD PRESSURE: 52 MMHG | RESPIRATION RATE: 16 BRPM | SYSTOLIC BLOOD PRESSURE: 95 MMHG | OXYGEN SATURATION: 96 % | TEMPERATURE: 97.8 F

## 2018-04-13 VITALS — OXYGEN SATURATION: 96 %

## 2018-04-13 RX ADMIN — IPRATROPIUM BROMIDE AND ALBUTEROL SULFATE SCH AMPULE: .5; 3 SOLUTION RESPIRATORY (INHALATION) at 14:10

## 2018-04-13 RX ADMIN — BUPROPION HYDROCHLORIDE SCH MG: 150 TABLET, FILM COATED ORAL at 08:27

## 2018-04-13 RX ADMIN — ESCITALOPRAM OXALATE SCH MG: 20 TABLET ORAL at 08:27

## 2018-04-13 RX ADMIN — ASPIRIN SCH MG: 325 TABLET ORAL at 08:27

## 2018-04-13 RX ADMIN — OXYCODONE HYDROCHLORIDE AND ACETAMINOPHEN SCH MG: 500 TABLET ORAL at 08:27

## 2018-04-13 RX ADMIN — IPRATROPIUM BROMIDE AND ALBUTEROL SULFATE SCH AMPULE: .5; 3 SOLUTION RESPIRATORY (INHALATION) at 21:07

## 2018-04-13 RX ADMIN — IPRATROPIUM BROMIDE AND ALBUTEROL SULFATE SCH AMPULE: .5; 3 SOLUTION RESPIRATORY (INHALATION) at 09:35

## 2018-04-13 RX ADMIN — SACUBITRIL AND VALSARTAN SCH TAB: 24; 26 TABLET, FILM COATED ORAL at 08:27

## 2018-04-13 RX ADMIN — SACUBITRIL AND VALSARTAN SCH TAB: 24; 26 TABLET, FILM COATED ORAL at 20:29

## 2018-04-13 RX ADMIN — FUROSEMIDE SCH MG: 20 TABLET ORAL at 08:27

## 2018-04-13 RX ADMIN — OXYCODONE HYDROCHLORIDE AND ACETAMINOPHEN SCH MG: 500 TABLET ORAL at 20:29

## 2018-04-13 RX ADMIN — METOPROLOL SUCCINATE SCH MG: 25 TABLET, EXTENDED RELEASE ORAL at 08:27

## 2018-04-13 RX ADMIN — PRAVASTATIN SODIUM SCH MG: 40 TABLET ORAL at 20:29

## 2018-04-13 RX ADMIN — BUDESONIDE AND FORMOTEROL FUMARATE DIHYDRATE SCH PUFF: 80; 4.5 AEROSOL RESPIRATORY (INHALATION) at 20:29

## 2018-04-13 RX ADMIN — CLOPIDOGREL BISULFATE SCH MG: 75 TABLET, FILM COATED ORAL at 08:27

## 2018-04-13 RX ADMIN — BUDESONIDE AND FORMOTEROL FUMARATE DIHYDRATE SCH PUFF: 80; 4.5 AEROSOL RESPIRATORY (INHALATION) at 08:27

## 2018-04-13 RX ADMIN — PANTOPRAZOLE SCH MG: 40 TABLET, DELAYED RELEASE ORAL at 08:27

## 2018-04-13 NOTE — HHI.PYPN
Subjective


Remarks


Patient seen for follow up; chart reviewed. Discussion with nursing staff 

reported that the patient was noted to have some improvement in mood today, 

eating breakfast today.  Patient was found ambulating in the hallways, states 

sleeping better last evening, attended a group today.  He states that his 

appetite is a little better today and mood has been "comes and goes" referring 

to feeling depressed.  He states feeling "a little better" today but with 

intermittent thoughts of not wanting to be alive.  He states hoping to feel 

better soon.





Review of Systems


Except as stated in HPI:  all other systems reviewed are Neg





Mental Status Examination


Appearance:  Appropriate


Consciousness:  Alert


Orientation:  x4


Motor Activity:  Normal gait


Speech:  Unremarkable


Language:  Adequate


Fund of Knowledge:  Adequate


Attention and Concentration:  Adequate


Memory:  Unremarkable


Mood:  Sad (less today)


Affect:  Sad (less today)


Thought Process & Associations:  Intact, Linear


Thought Content:  Appropriate


Hallucination Type:  None


Delusion Type:  None


Suicidal Ideation:  Yes (intermittent)


Suicidal Plan:  No


Suicidal Intention:  No


Homicidal Ideation:  No


Homicidal Plan:  No


Homicidal Intention:  No


Insight:  Fair


Judgment:  Impulsive





Results


Vitals/IOs





Vital Signs








  Date Time  Temp Pulse Resp B/P (MAP) Pulse Ox O2 Delivery O2 Flow Rate FiO2


 


4/13/18 17:15 97.8 62 16 95/52 (66) 96   


 


4/12/18 19:40        21














Intake and Output   


 


 4/13/18 4/13/18 4/14/18





 08:00 16:00 00:00


 


Intake Total 120 ml 480 ml 1080 ml


 


Balance 120 ml 480 ml 1080 ml











Assessment & Plan


Problem List:  


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


ICD Codes:  F33.2 - Major depressive disorder, recurrent severe without 

psychotic features


Assessment & Plan


Patient continues to feel depressed but was noted to have better mood today and 

starting to eat more.  Will continue current treatment, continue rest of 

medications.  Continue to monitor mood and behavior. Discharge planning in 

progress.


Justification for Cont. Inpt.


At risk of further decompensation at lower level of care.











Naga Perez MD Apr 13, 2018 17:46

## 2018-04-14 VITALS — OXYGEN SATURATION: 96 %

## 2018-04-14 VITALS
SYSTOLIC BLOOD PRESSURE: 126 MMHG | DIASTOLIC BLOOD PRESSURE: 58 MMHG | HEART RATE: 56 BPM | TEMPERATURE: 97.9 F | OXYGEN SATURATION: 97 % | RESPIRATION RATE: 17 BRPM

## 2018-04-14 VITALS
TEMPERATURE: 97.7 F | OXYGEN SATURATION: 95 % | SYSTOLIC BLOOD PRESSURE: 116 MMHG | HEART RATE: 65 BPM | RESPIRATION RATE: 16 BRPM | DIASTOLIC BLOOD PRESSURE: 82 MMHG

## 2018-04-14 RX ADMIN — ASPIRIN SCH MG: 325 TABLET ORAL at 09:16

## 2018-04-14 RX ADMIN — CLOPIDOGREL BISULFATE SCH MG: 75 TABLET, FILM COATED ORAL at 09:15

## 2018-04-14 RX ADMIN — OXYCODONE HYDROCHLORIDE AND ACETAMINOPHEN SCH MG: 500 TABLET ORAL at 09:15

## 2018-04-14 RX ADMIN — PANTOPRAZOLE SCH MG: 40 TABLET, DELAYED RELEASE ORAL at 09:16

## 2018-04-14 RX ADMIN — ESCITALOPRAM OXALATE SCH MG: 20 TABLET ORAL at 09:16

## 2018-04-14 RX ADMIN — BUPROPION HYDROCHLORIDE SCH MG: 150 TABLET, FILM COATED ORAL at 09:16

## 2018-04-14 RX ADMIN — IPRATROPIUM BROMIDE AND ALBUTEROL SULFATE SCH AMPULE: .5; 3 SOLUTION RESPIRATORY (INHALATION) at 08:00

## 2018-04-14 RX ADMIN — METOPROLOL SUCCINATE SCH MG: 25 TABLET, EXTENDED RELEASE ORAL at 09:16

## 2018-04-14 RX ADMIN — IPRATROPIUM BROMIDE AND ALBUTEROL SULFATE SCH AMPULE: .5; 3 SOLUTION RESPIRATORY (INHALATION) at 14:07

## 2018-04-14 RX ADMIN — FUROSEMIDE SCH MG: 20 TABLET ORAL at 09:15

## 2018-04-14 RX ADMIN — BUDESONIDE AND FORMOTEROL FUMARATE DIHYDRATE SCH PUFF: 80; 4.5 AEROSOL RESPIRATORY (INHALATION) at 20:25

## 2018-04-14 RX ADMIN — SACUBITRIL AND VALSARTAN SCH TAB: 24; 26 TABLET, FILM COATED ORAL at 09:15

## 2018-04-14 RX ADMIN — OXYCODONE HYDROCHLORIDE AND ACETAMINOPHEN SCH MG: 500 TABLET ORAL at 20:25

## 2018-04-14 RX ADMIN — SACUBITRIL AND VALSARTAN SCH TAB: 24; 26 TABLET, FILM COATED ORAL at 20:25

## 2018-04-14 RX ADMIN — IPRATROPIUM BROMIDE AND ALBUTEROL SULFATE SCH AMPULE: .5; 3 SOLUTION RESPIRATORY (INHALATION) at 21:25

## 2018-04-14 RX ADMIN — BUDESONIDE AND FORMOTEROL FUMARATE DIHYDRATE SCH PUFF: 80; 4.5 AEROSOL RESPIRATORY (INHALATION) at 09:16

## 2018-04-14 RX ADMIN — PRAVASTATIN SODIUM SCH MG: 40 TABLET ORAL at 20:25

## 2018-04-14 NOTE — HHI.PYPN
Subjective


Remarks


Patient was seen and case discussed with nursing.  Patient remains depressed.  

He is minimally engaged, flat, poor eye contact.  Says he has fleeting suicidal 

ideation without intent or plan.  Nursing is not noticed any crying or labile 

behavior.  Her appetite has improved





Mental Status Examination


Appearance:  Appropriate


Consciousness:  Alert


Orientation:  x4


Motor Activity:  Normal gait


Speech:  Unremarkable


Language:  Adequate


Fund of Knowledge:  Adequate


Attention and Concentration:  Adequate


Memory:  Unremarkable


Mood:  Sad (less today)


Affect:  Sad (less today)


Thought Process & Associations:  Intact, Linear


Thought Content:  Appropriate


Hallucination Type:  None


Delusion Type:  None


Suicidal Ideation:  Yes (intermittent)


Suicidal Plan:  No


Suicidal Intention:  No


Homicidal Ideation:  No


Homicidal Plan:  No


Homicidal Intention:  No


Insight:  Fair


Judgment:  Impulsive





Results


Vitals/IOs





Vital Signs








  Date Time  Temp Pulse Resp B/P (MAP) Pulse Ox O2 Delivery O2 Flow Rate FiO2


 


4/14/18 10:02     96   21


 


4/14/18 05:43 97.7 65 16 116/82 (93)    














Intake and Output   


 


 4/14/18 4/14/18 4/15/18





 08:00 16:00 00:00


 


Intake Total 600 ml 240 ml 


 


Balance 600 ml 240 ml 











Assessment & Plan


Problem List:  


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


ICD Codes:  F33.2 - Major depressive disorder, recurrent severe without 

psychotic features


Assessment & Plan


Continue current treatment plan


Justification for Cont. Inpt.


Patient would decompensate in a less restrictive setting











David Camp DO Apr 14, 2018 15:30

## 2018-04-15 VITALS
SYSTOLIC BLOOD PRESSURE: 124 MMHG | DIASTOLIC BLOOD PRESSURE: 59 MMHG | HEART RATE: 59 BPM | OXYGEN SATURATION: 96 % | TEMPERATURE: 98.2 F | RESPIRATION RATE: 16 BRPM

## 2018-04-15 VITALS
TEMPERATURE: 97.2 F | DIASTOLIC BLOOD PRESSURE: 74 MMHG | RESPIRATION RATE: 16 BRPM | OXYGEN SATURATION: 95 % | SYSTOLIC BLOOD PRESSURE: 149 MMHG | HEART RATE: 55 BPM

## 2018-04-15 RX ADMIN — SACUBITRIL AND VALSARTAN SCH TAB: 24; 26 TABLET, FILM COATED ORAL at 20:29

## 2018-04-15 RX ADMIN — OXYCODONE HYDROCHLORIDE AND ACETAMINOPHEN SCH MG: 500 TABLET ORAL at 20:29

## 2018-04-15 RX ADMIN — BUDESONIDE AND FORMOTEROL FUMARATE DIHYDRATE SCH PUFF: 80; 4.5 AEROSOL RESPIRATORY (INHALATION) at 20:29

## 2018-04-15 RX ADMIN — BUPROPION HYDROCHLORIDE SCH MG: 150 TABLET, FILM COATED ORAL at 08:54

## 2018-04-15 RX ADMIN — IPRATROPIUM BROMIDE AND ALBUTEROL SULFATE SCH AMPULE: .5; 3 SOLUTION RESPIRATORY (INHALATION) at 14:00

## 2018-04-15 RX ADMIN — IPRATROPIUM BROMIDE AND ALBUTEROL SULFATE SCH AMPULE: .5; 3 SOLUTION RESPIRATORY (INHALATION) at 20:00

## 2018-04-15 RX ADMIN — IPRATROPIUM BROMIDE AND ALBUTEROL SULFATE SCH AMPULE: .5; 3 SOLUTION RESPIRATORY (INHALATION) at 07:20

## 2018-04-15 RX ADMIN — PRAVASTATIN SODIUM SCH MG: 40 TABLET ORAL at 20:29

## 2018-04-15 RX ADMIN — FUROSEMIDE SCH MG: 20 TABLET ORAL at 08:55

## 2018-04-15 RX ADMIN — CLOPIDOGREL BISULFATE SCH MG: 75 TABLET, FILM COATED ORAL at 08:56

## 2018-04-15 RX ADMIN — OXYCODONE HYDROCHLORIDE AND ACETAMINOPHEN SCH MG: 500 TABLET ORAL at 08:54

## 2018-04-15 RX ADMIN — SACUBITRIL AND VALSARTAN SCH TAB: 24; 26 TABLET, FILM COATED ORAL at 08:56

## 2018-04-15 RX ADMIN — METOPROLOL SUCCINATE SCH MG: 25 TABLET, EXTENDED RELEASE ORAL at 08:56

## 2018-04-15 RX ADMIN — ASPIRIN SCH MG: 325 TABLET ORAL at 08:54

## 2018-04-15 RX ADMIN — PANTOPRAZOLE SCH MG: 40 TABLET, DELAYED RELEASE ORAL at 08:54

## 2018-04-15 RX ADMIN — BUDESONIDE AND FORMOTEROL FUMARATE DIHYDRATE SCH PUFF: 80; 4.5 AEROSOL RESPIRATORY (INHALATION) at 08:56

## 2018-04-15 RX ADMIN — ESCITALOPRAM OXALATE SCH MG: 20 TABLET ORAL at 08:56

## 2018-04-16 VITALS
HEART RATE: 64 BPM | OXYGEN SATURATION: 95 % | SYSTOLIC BLOOD PRESSURE: 112 MMHG | DIASTOLIC BLOOD PRESSURE: 66 MMHG | TEMPERATURE: 96.8 F | RESPIRATION RATE: 14 BRPM

## 2018-04-16 RX ADMIN — ASPIRIN SCH MG: 325 TABLET ORAL at 09:15

## 2018-04-16 RX ADMIN — ESCITALOPRAM OXALATE SCH MG: 20 TABLET ORAL at 09:15

## 2018-04-16 RX ADMIN — IPRATROPIUM BROMIDE AND ALBUTEROL SULFATE SCH AMPULE: .5; 3 SOLUTION RESPIRATORY (INHALATION) at 08:26

## 2018-04-16 RX ADMIN — CLOPIDOGREL BISULFATE SCH MG: 75 TABLET, FILM COATED ORAL at 09:16

## 2018-04-16 RX ADMIN — BUPROPION HYDROCHLORIDE SCH MG: 150 TABLET, FILM COATED ORAL at 09:17

## 2018-04-16 RX ADMIN — METOPROLOL SUCCINATE SCH MG: 25 TABLET, EXTENDED RELEASE ORAL at 09:15

## 2018-04-16 RX ADMIN — OXYCODONE HYDROCHLORIDE AND ACETAMINOPHEN SCH MG: 500 TABLET ORAL at 09:17

## 2018-04-16 RX ADMIN — IPRATROPIUM BROMIDE AND ALBUTEROL SULFATE SCH AMPULE: .5; 3 SOLUTION RESPIRATORY (INHALATION) at 12:11

## 2018-04-16 RX ADMIN — FUROSEMIDE SCH MG: 20 TABLET ORAL at 09:16

## 2018-04-16 RX ADMIN — BUDESONIDE AND FORMOTEROL FUMARATE DIHYDRATE SCH PUFF: 80; 4.5 AEROSOL RESPIRATORY (INHALATION) at 09:17

## 2018-04-16 RX ADMIN — PANTOPRAZOLE SCH MG: 40 TABLET, DELAYED RELEASE ORAL at 09:15

## 2018-04-16 RX ADMIN — SACUBITRIL AND VALSARTAN SCH TAB: 24; 26 TABLET, FILM COATED ORAL at 09:15
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
## 2018-04-17 NOTE — PD.TTN
Patient Problems


1. Discharge planning


2. Medication compliance


3. Knowledge deficit


4. Lack of coping skills





Progress Toward Goals


Provider Present:  Dr. OTF Perez


Provider Input:  


4/16/18 patient is ready for aniticipated discharge home today





04/11/2018; patient's medication is being adjusted


4/09/18 - Dr. Perez reported that the patient is interested in placement


at the [facility] nursing [facility].  There is a long wait list for the VA nursing


home.  Presently the patient is staying with a friend and has home health


care.  A [facility]  will need to assist the patient in filling out


the thirty page application.


Nurse(s) Present:  HEIDY Sellers


Nurse(s) Input:  


4/16/18 he is med compliant and out in milieu and likes to talk, appears


lonely


04/11/2018; patient is eating and taking his medication, requires


redirection with behavior


Psychiatric Counselors Present:  Charlotte Garcia LCSW, Carlee Fong, [facility], 

Staci Wiley, [facility]


Psych Therapist Input:  


4/16/18patient is cooperative and shares he feels old and accepted that


his time may come and appears to struggle with his medical issues and his


worth in life at this time, he is recommended to go to nursing home and


has been offered with [facility]  and [facility] Care 


to work on this


04/11/2018; counselor call [facility]ORG[facility] supervisor; please refer to note


charted





4/09/18 - Counselor will direct the patient to his [facility]  who


may assist the patient with housing application.


Group Spec/RT/OT/MOREIRA Present:  JOSH Felipe


Group Spec/RT/OT/MOREIRA Input:  


4/16/18 pt attends select groups when encouraged


04/11/2018; patient attends groups with appropriate behavior





4/09/18 - Patient attends select group activities and is pleasant and


cooperative.





Discharge Plan


4/09/18 - [facility] the [facility].





Documentation


Scribe:  Carlee Fong


Date Resolved:  Apr 9, 2018











Charlotte Garcia LCSW Apr 17, 2018 13:13

## 2022-01-12 NOTE — HHI.PYPN
Subjective


Remarks


Patient was seen and case discussed with nursing.  Patient remains withdrawn 

and seclusive to self.  Affect is blunted.  He has fleeting suicidal ideation 

without intent or plan.  He is compliant with medications and behaving well on 

the unit.  Patient is concerned he has been getting the wrong diet.  He says 

should he should be on a cardiac diabetes been getting bates and cheese





Mental Status Examination


Appearance:  Appropriate


Consciousness:  Alert


Orientation:  x4


Motor Activity:  Normal gait


Speech:  Unremarkable


Language:  Adequate


Fund of Knowledge:  Adequate


Attention and Concentration:  Adequate


Memory:  Unremarkable


Mood:  Sad (less today)


Affect:  Sad (less today)


Thought Process & Associations:  Intact, Linear


Thought Content:  Appropriate


Hallucination Type:  None


Delusion Type:  None


Suicidal Ideation:  Yes (intermittent)


Suicidal Plan:  No


Suicidal Intention:  No


Homicidal Ideation:  No


Homicidal Plan:  No


Homicidal Intention:  No


Insight:  Fair


Judgment:  Impulsive





Results


Vitals/IOs





Vital Signs








  Date Time  Temp Pulse Resp B/P (MAP) Pulse Ox O2 Delivery O2 Flow Rate FiO2


 


4/15/18 05:45 97.2 55 16 149/74 (99) 95   


 


4/14/18 21:25        21














Intake and Output   


 


 4/15/18 4/15/18 4/16/18





 08:00 16:00 00:00


 


Intake Total 0 ml  


 


Balance 0 ml  











Assessment & Plan


Problem List:  


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


ICD Codes:  F33.2 - Major depressive disorder, recurrent severe without 

psychotic features


Assessment & Plan


Continue current treatment plan.  Change the cardiac diet


Justification for Cont. Inpt.


Patient would decompensate in a less restrictive setting











David Camp DO Apr 15, 2018 14:41 Diarrhea
